# Patient Record
Sex: FEMALE | Race: WHITE | NOT HISPANIC OR LATINO | Employment: FULL TIME | URBAN - METROPOLITAN AREA
[De-identification: names, ages, dates, MRNs, and addresses within clinical notes are randomized per-mention and may not be internally consistent; named-entity substitution may affect disease eponyms.]

---
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
## 2021-01-26 ENCOUNTER — OFFICE VISIT (OUTPATIENT)
Dept: PSYCHIATRY | Facility: CLINIC | Age: 21
End: 2021-01-26
Payer: COMMERCIAL

## 2021-01-26 VITALS — BODY MASS INDEX: 32.63 KG/M2 | HEIGHT: 69 IN | WEIGHT: 220.3 LBS

## 2021-01-26 DIAGNOSIS — G47.00 INSOMNIA, UNSPECIFIED TYPE: ICD-10-CM

## 2021-01-26 DIAGNOSIS — F41.1 GENERALIZED ANXIETY DISORDER: Primary | ICD-10-CM

## 2021-01-26 DIAGNOSIS — F41.0 PANIC ATTACKS: ICD-10-CM

## 2021-01-26 DIAGNOSIS — F43.12 CHRONIC POST-TRAUMATIC STRESS DISORDER (PTSD): ICD-10-CM

## 2021-01-26 PROCEDURE — 90791 PSYCH DIAGNOSTIC EVALUATION: CPT | Performed by: PHYSICIAN ASSISTANT

## 2021-01-26 PROCEDURE — 3008F BODY MASS INDEX DOCD: CPT | Performed by: FAMILY MEDICINE

## 2021-01-26 RX ORDER — HYDROXYZINE 50 MG/1
TABLET, FILM COATED ORAL
Qty: 90 TABLET | Refills: 0 | Status: SHIPPED | OUTPATIENT
Start: 2021-01-26 | End: 2021-05-28 | Stop reason: SINTOL

## 2021-01-26 RX ORDER — PAROXETINE HYDROCHLORIDE 20 MG/1
20 TABLET, FILM COATED ORAL DAILY
Qty: 90 TABLET | Refills: 0 | Status: SHIPPED | OUTPATIENT
Start: 2021-01-26 | End: 2021-03-25 | Stop reason: SINTOL

## 2021-01-26 NOTE — BH TREATMENT PLAN
TREATMENT PLAN (Medication Management Only)        [facility]    Name and Date of Birth:  Rozina Dawkins 24 y o  2000  Date of Treatment Plan: January 26, 2021  Diagnosis/Diagnoses:    1  Generalized anxiety disorder    2  Chronic post-traumatic stress disorder (PTSD)    3  Panic attacks    4  Insomnia, unspecified type      Strengths/Personal Resources for Self-Care: "Music--I like to sing; I like to belly dance; I like to do make-up"  Area/Areas of need (in own words): "I wanna be more calm, I want to be more relaxed  I don't want to worry, worry worry "  She wants to be able to handle life situations better, not be "Scared of everything "  1  Long Term Goal: Control anxiety, panic and PTSD  Target Date:3-6 months  Person/Persons responsible for completion of goal: Fanta Tripp  2  Short Term Objective (s) - How will we reach this goal?:   A  Provider new recommended medication/dosage changes and/or continue medication(s): Pt is having Sxs of severe ANGIE, also PTSD, Panic Attacks, and Insomnia  She is dysphoric but denies depression  Uncertain if true OCD vs severe exacerbation of anxiety and will further explore in the future  Tx options discussed and Pt accepts to start Paroxetine for all Sxs and to restart Hydroxyzine (which she had tried in the past) for insomnia  I advised against street THC and Pt intends to get her medical marijuana card  I recommend psychotherapy and Pt wants community resources --was given [employer] and [employer]   Treatment plan done and Pt accepts the plan  Continue:  Start Paroxetine 20mg (1) tab po qd # 90  In 1 week start Hydroxyzine 50mg (1/2-1) tab po qhs prn insomnia # 90  Serum Beta HCG, UDS  Return 4-8 weeks, the sooner available appt (availability is tight)  Call in 1 month to let me know how she is doing    Can call any time sooner prn   A copy of Tx plan is being given to Pt per her request   She is working on getting a [employer] passcode--had some difficulties with the process and has contacted her PCP about it  Target Date:3-6 months  Person/Persons Responsible for Completion of Goal: Gladys Meza  Progress Towards Goals: stable, continuing treatment  Treatment Modality: Medication mgt, referred to psychotherapy  Review due 180 days from date of this plan: 4-6 months  Expected length of service: ongoing treatment  My Physician/PA/NP and I have developed this plan together and I agree to work on the goals and objectives  I understand the treatment goals that were developed for my treatment

## 2021-02-14 ENCOUNTER — OFFICE VISIT (OUTPATIENT)
Dept: URGENT CARE | Facility: MEDICAL CENTER | Age: 21
End: 2021-02-14
Payer: COMMERCIAL

## 2021-02-14 VITALS
TEMPERATURE: 98.2 F | OXYGEN SATURATION: 97 % | WEIGHT: 220 LBS | BODY MASS INDEX: 32.58 KG/M2 | HEART RATE: 82 BPM | HEIGHT: 69 IN

## 2021-02-14 DIAGNOSIS — B34.9 ACUTE VIRAL SYNDROME: Primary | ICD-10-CM

## 2021-02-14 PROCEDURE — G0382 LEV 3 HOSP TYPE B ED VISIT: HCPCS | Performed by: PHYSICIAN ASSISTANT

## 2021-02-14 PROCEDURE — 99213 OFFICE O/P EST LOW 20 MIN: CPT | Performed by: PHYSICIAN ASSISTANT

## 2021-02-14 PROCEDURE — U0005 INFEC AGEN DETEC AMPLI PROBE: HCPCS | Performed by: PHYSICIAN ASSISTANT

## 2021-02-14 PROCEDURE — U0003 INFECTIOUS AGENT DETECTION BY NUCLEIC ACID (DNA OR RNA); SEVERE ACUTE RESPIRATORY SYNDROME CORONAVIRUS 2 (SARS-COV-2) (CORONAVIRUS DISEASE [COVID-19]), AMPLIFIED PROBE TECHNIQUE, MAKING USE OF HIGH THROUGHPUT TECHNOLOGIES AS DESCRIBED BY CMS-2020-01-R: HCPCS | Performed by: PHYSICIAN ASSISTANT

## 2021-02-14 PROCEDURE — 99283 EMERGENCY DEPT VISIT LOW MDM: CPT | Performed by: PHYSICIAN ASSISTANT

## 2021-02-14 NOTE — PROGRESS NOTES
[address]        NAME: Jean-Pierre Peralta is a 24 y o  female  : 2000    MRN: [id number]  DATE: 2021  TIME: 9:20 AM    Assessment and Plan   Acute viral syndrome [B34 9]  1  Acute viral syndrome  Novel Coronavirus (Covid-19),PCR [facility] - Office Collection     COVID-19 swab performed due to symptoms  Advised to isolate and treat symptomatically until results available  Patient Instructions   Tylenol or Motrin for pain or fever  Drink plenty of fluids and get rest  May use OTC cough or congestion medications  Isolate until results available  Follow up with PCP in 3-5 days  Proceed to  ER if symptoms worsen  Chief Complaint     Chief Complaint   Patient presents with    Cold Like Symptoms     she had been having these symptoms since monday    Cough    Fatigue    Shortness of Breath    Fever    Headache    Sore Throat         History of Present Illness       Patient is a 31-year-old female who presents today with complaints of sore throat, cough, congestion, diarrhea, shortness of breath, chills, low-grade fever for the past 6 days  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  Fevers have not been over 100  Patient does have asthma as well but has not needed to use her inhaler, no wheezing  Denies any abdominal pain or nausea or vomiting  Has been taking OTC medications with some relief  Review of Systems   Review of Systems   Constitutional: Positive for chills, fatigue and fever  HENT: Positive for congestion and sore throat  Negative for ear pain  Respiratory: Positive for cough and shortness of breath  Negative for wheezing  Cardiovascular: Negative for chest pain  Gastrointestinal: Positive for diarrhea  Negative for abdominal pain, nausea and vomiting  Musculoskeletal: Negative for myalgias           Current Medications       Current Outpatient Medications:     acetaminophen (TYLENOL) 325 mg tablet, Take 2 tablets (650 mg total) by mouth every 6 (six) hours as needed for mild pain, headaches or fever, Disp: 30 tablet, Rfl: 0    albuterol (PROVENTIL HFA,VENTOLIN HFA) 90 mcg/act inhaler, Inhale 2 puffs every 6 (six) hours as needed for wheezing or shortness of breath, Disp: 1 Inhaler, Rfl: 1    Cholecalciferol (CVS VITAMIN D3) 1000 units capsule, Take 1,000 Units by mouth daily , Disp: , Rfl:     Ferrous Sulfate (IRON) 325 (65 Fe) MG TABS, Take 1 tablet by mouth daily , Disp: , Rfl:     hydrOXYzine HCL (ATARAX) 50 mg tablet, Start in 1 week and take 1/2-1 tab po qhs prn insomnia, Disp: 90 tablet, Rfl: 0    Loratadine (CLARITIN PO), Take by mouth daily, Disp: , Rfl:     medroxyPROGESTERone (DEPO-PROVERA) 150 mg/mL injection, Inject 1 mL (150 mg total) into a muscle every 3 (three) months, Disp: 1 mL, Rfl: 3    PARoxetine (PAXIL) 20 mg tablet, Take 1 tablet (20 mg total) by mouth daily, Disp: 90 tablet, Rfl: 0    tobramycin (TOBREX) 0 3 % SOLN, Administer 1 drop to the right eye every 4 (four) hours while awake (Patient not taking: Reported on 2/14/2021), Disp: 5 mL, Rfl: 0    Current Facility-Administered Medications:     medroxyPROGESTERone (DEPO-PROVERA) IM injection 150 mg, 150 mg, Intramuscular, Q3 Months, Tara Deshpande PA-C, 150 mg at 10/01/20 1316    Current Allergies     Allergies as of 02/14/2021    (No Known Allergies)            The following portions of the patient's history were reviewed and updated as appropriate: allergies, current medications, past family history, past medical history, past social history, past surgical history and problem list      Past Medical History:   Diagnosis Date    Allergic     pollen; animal dander    Anemia     Anxiety     Asthma     Closed left ankle fracture     Depression     Dysmenorrhea     Influenza A 2/28/2020    Insomnia     Moderate single current episode of major depressive disorder ([facility] [address] ) 1/5/2017       Past Surgical History:   Procedure Laterality Date    ANKLE SURGERY Left 2014    two screws       Family History   Problem Relation Age of Onset    Bipolar disorder Mother         Fentanyl per Pt    Drug abuse Mother     Anxiety disorder Sister     Bipolar disorder Sister     Autism spectrum disorder Cousin     Leukemia Paternal Aunt          Medications have been verified  Objective   Pulse 82   Temp 98 2 °F (36 8 °C)   Ht 5' 9" (1 753 m)   Wt 99 8 kg (220 lb)   SpO2 97%   BMI 32 49 kg/m²        Physical Exam     Physical Exam  Constitutional:       General: She is not in acute distress  Appearance: She is well-developed  She is obese  HENT:      Head: Normocephalic and atraumatic  Nose: Congestion present  Mouth/Throat:      Mouth: Mucous membranes are moist       Pharynx: Oropharynx is clear  Eyes:      Pupils: Pupils are equal, round, and reactive to light  Neck:      Musculoskeletal: Neck supple  Cardiovascular:      Rate and Rhythm: Normal rate and regular rhythm  Pulmonary:      Effort: Pulmonary effort is normal       Breath sounds: Normal breath sounds  No decreased breath sounds, wheezing, rhonchi or rales  Abdominal:      General: Bowel sounds are normal       Palpations: Abdomen is soft  Lymphadenopathy:      Cervical: No cervical adenopathy  Skin:     General: Skin is warm and dry  Neurological:      Mental Status: She is alert

## 2021-02-14 NOTE — LETTER
February 14, 2021     Patient: Alfredo Thornton   YOB: 2000   Date of Visit: 2/14/2021       To Whom it May Concern:    Alfredo Thornton was seen in my clinic on 2/14/2021  Please excuse from work until test results available  If you have any questions or concerns, please don't hesitate to call           Sincerely,          Jatinder Perez PA-C        CC: Alfredo Thornton

## 2021-02-15 LAB — SARS-COV-2 RNA RESP QL NAA+PROBE: NEGATIVE

## 2021-03-19 ENCOUNTER — TELEPHONE (OUTPATIENT)
Dept: FAMILY MEDICINE CLINIC | Facility: MEDICAL CENTER | Age: 21
End: 2021-03-19

## 2021-03-19 ENCOUNTER — CLINICAL SUPPORT (OUTPATIENT)
Dept: OBGYN CLINIC | Facility: MEDICAL CENTER | Age: 21
End: 2021-03-19
Payer: COMMERCIAL

## 2021-03-19 DIAGNOSIS — M54.50 LOW BACK PAIN, UNSPECIFIED BACK PAIN LATERALITY, UNSPECIFIED CHRONICITY, UNSPECIFIED WHETHER SCIATICA PRESENT: Primary | ICD-10-CM

## 2021-03-19 DIAGNOSIS — Z30.42 ENCOUNTER FOR MANAGEMENT AND INJECTION OF DEPO-PROVERA: Primary | ICD-10-CM

## 2021-03-19 PROCEDURE — 96372 THER/PROPH/DIAG INJ SC/IM: CPT | Performed by: NURSE PRACTITIONER

## 2021-03-19 RX ORDER — MEDROXYPROGESTERONE ACETATE 150 MG/ML
150 INJECTION, SUSPENSION INTRAMUSCULAR ONCE
Status: COMPLETED | OUTPATIENT
Start: 2021-03-19 | End: 2021-03-19

## 2021-03-19 RX ADMIN — MEDROXYPROGESTERONE ACETATE 150 MG: 150 INJECTION, SUSPENSION INTRAMUSCULAR at 11:47

## 2021-03-19 NOTE — PROGRESS NOTES
Date last yearly: 7/7/20  Last Depo-Provera: 12/22/21  Side Effects if any: Tolerated well  Serum HCG indicated?  No  Depo-Provera 150 mg IM given by: Ham Padron MA  LMP/Bleeding/Spotting: spotting sometimes  Site: L buttock  Next appointment due June 4-618/21  JILINDA:[id number]  Exp:03/2023  MBE:[id number]  Manufacture:CARLO

## 2021-03-19 NOTE — TELEPHONE ENCOUNTER
Pt called to ask advice from Dr Israel Olsen  She has been through physical therapy and is still having "extreme" tailbone and leg pain  She is not sure what she should do next    Please advise
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
## 2021-03-25 ENCOUNTER — OFFICE VISIT (OUTPATIENT)
Dept: PSYCHIATRY | Facility: CLINIC | Age: 21
End: 2021-03-25
Payer: COMMERCIAL

## 2021-03-25 DIAGNOSIS — F43.12 CHRONIC POST-TRAUMATIC STRESS DISORDER (PTSD): ICD-10-CM

## 2021-03-25 DIAGNOSIS — F31.81 BIPOLAR II DISORDER (HCC): Primary | ICD-10-CM

## 2021-03-25 DIAGNOSIS — F41.0 PANIC ATTACKS: ICD-10-CM

## 2021-03-25 DIAGNOSIS — G47.00 INSOMNIA, UNSPECIFIED TYPE: ICD-10-CM

## 2021-03-25 DIAGNOSIS — F41.1 GENERALIZED ANXIETY DISORDER: ICD-10-CM

## 2021-03-25 PROCEDURE — 99213 OFFICE O/P EST LOW 20 MIN: CPT | Performed by: PHYSICIAN ASSISTANT

## 2021-03-25 RX ORDER — LAMOTRIGINE 100 MG/1
TABLET ORAL
Qty: 15 TABLET | Refills: 2 | Status: SHIPPED | OUTPATIENT
Start: 2021-03-25 | End: 2021-05-28 | Stop reason: SINTOL

## 2021-03-25 RX ORDER — LAMOTRIGINE 25 MG/1
25 TABLET ORAL DAILY
Qty: 14 TABLET | Refills: 0 | Status: SHIPPED | OUTPATIENT
Start: 2021-03-25 | End: 2021-05-28 | Stop reason: SINTOL

## 2021-03-30 ENCOUNTER — CONSULT (OUTPATIENT)
Dept: PAIN MEDICINE | Facility: CLINIC | Age: 21
End: 2021-03-30
Payer: COMMERCIAL

## 2021-03-30 VITALS
RESPIRATION RATE: 16 BRPM | DIASTOLIC BLOOD PRESSURE: 92 MMHG | SYSTOLIC BLOOD PRESSURE: 132 MMHG | WEIGHT: 235.8 LBS | HEIGHT: 69 IN | BODY MASS INDEX: 34.93 KG/M2 | HEART RATE: 89 BPM

## 2021-03-30 DIAGNOSIS — M54.42 CHRONIC BILATERAL LOW BACK PAIN WITH BILATERAL SCIATICA: ICD-10-CM

## 2021-03-30 DIAGNOSIS — M54.16 LUMBAR RADICULOPATHY: ICD-10-CM

## 2021-03-30 DIAGNOSIS — G89.29 CHRONIC BILATERAL LOW BACK PAIN WITH BILATERAL SCIATICA: ICD-10-CM

## 2021-03-30 DIAGNOSIS — M54.41 CHRONIC BILATERAL LOW BACK PAIN WITH BILATERAL SCIATICA: ICD-10-CM

## 2021-03-30 DIAGNOSIS — G89.4 CHRONIC PAIN SYNDROME: Primary | ICD-10-CM

## 2021-03-30 PROCEDURE — 99244 OFF/OP CNSLTJ NEW/EST MOD 40: CPT | Performed by: ANESTHESIOLOGY

## 2021-03-30 PROCEDURE — 1036F TOBACCO NON-USER: CPT | Performed by: ANESTHESIOLOGY

## 2021-03-30 PROCEDURE — 3008F BODY MASS INDEX DOCD: CPT | Performed by: ANESTHESIOLOGY

## 2021-03-30 NOTE — PATIENT INSTRUCTIONS
Magnetic Resonance Imaging   WHAT YOU NEED TO KNOW:   What do I need to know about magnetic resonance imaging (MRI)? An MRI is a test that uses magnetic fields and radio waves to take pictures inside your body  An MRI is used to see blood vessels, tissue, muscles, and bones  It can also show organs, such as your heart, lungs, or liver  An MRI can help your healthcare provider diagnose or treat a medical condition  It does not use radiation  How do I prepare for an MRI? · Your healthcare provider will tell you which medicines to take or not take on the day of your MRI  He or she may give you medicine to help you feel calm and relaxed during the MRI  · Tell your provider if you know or think you are pregnant  · Tell your provider if you have any metal in your body, such as a pacemaker, implant, or aneurism clip  Tell him or her if you have a tattoo or wear a medicine patch  · Remove any metal items, such as hair clips, jewelry, glasses, hearing aids, or dentures before you enter the MRI room  What will happen during an MRI? Your healthcare provider will ask you to lie on a table  Contrast liquid may be used to help a body part show up more clearly  The table will be moved into an open space in the middle of the machine  You will need to lie still during the MRI  It is normal to hear knocking, thumping, or clicking noises from the machine  What are the risks of an MRI? An MRI may cause a metal object in your body to move out of place and cause serious injury, or stop working properly  The contrast liquid may cause nausea, a headache, lightheadedness, or pain at the injection site  You could have an allergic reaction to the contrast liquid  CARE AGREEMENT:   You have the right to help plan your care  Learn about your health condition and how it may be treated  Discuss treatment options with your healthcare providers to decide what care you want to receive   You always have the right to refuse treatment  The above information is an  only  It is not intended as medical advice for individual conditions or treatments  Talk to your doctor, nurse or pharmacist before following any medical regimen to see if it is safe and effective for you  © Copyright [address] Information is for End User's use only and may not be sold, redistributed or otherwise used for commercial purposes   All illustrations and images included in CareNotes® are the copyrighted property of A D A M , Inc  or [address] [place] [address]
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
## 2021-04-17 ENCOUNTER — OFFICE VISIT (OUTPATIENT)
Dept: URGENT CARE | Facility: MEDICAL CENTER | Age: 21
End: 2021-04-17
Payer: COMMERCIAL

## 2021-04-17 VITALS
HEIGHT: 69 IN | WEIGHT: 200 LBS | TEMPERATURE: 97 F | OXYGEN SATURATION: 98 % | HEART RATE: 98 BPM | BODY MASS INDEX: 29.62 KG/M2

## 2021-04-17 DIAGNOSIS — J02.9 SORE THROAT: Primary | ICD-10-CM

## 2021-04-17 LAB — S PYO AG THROAT QL: NEGATIVE

## 2021-04-17 PROCEDURE — 99213 OFFICE O/P EST LOW 20 MIN: CPT | Performed by: PHYSICIAN ASSISTANT

## 2021-04-17 PROCEDURE — 99283 EMERGENCY DEPT VISIT LOW MDM: CPT | Performed by: PHYSICIAN ASSISTANT

## 2021-04-17 PROCEDURE — 3008F BODY MASS INDEX DOCD: CPT | Performed by: ANESTHESIOLOGY

## 2021-04-17 PROCEDURE — 87880 STREP A ASSAY W/OPTIC: CPT | Performed by: PHYSICIAN ASSISTANT

## 2021-04-17 PROCEDURE — G0382 LEV 3 HOSP TYPE B ED VISIT: HCPCS | Performed by: PHYSICIAN ASSISTANT

## 2021-04-17 NOTE — PROGRESS NOTES
[facility]        NAME: Jewels Sánchez is a 24 y o  female  : 2000    MRN: [id number]  DATE: 2021  TIME: 7:39 PM    Assessment and Plan   Sore throat [J02 9]  1  Sore throat  POCT rapid strepA      strep negative in office  Her physical exam is more consistent with small blisters that are likely due to thermal burn or aphthous ulcers  Advised Tylenol or Motrin for pain and warm salt water gargles  Patient Instructions     Tylenol or Motrin for pain   warm salt water gargles  Follow up with PCP in 3-5 days  Proceed to  ER if symptoms worsen  Chief Complaint     Chief Complaint   Patient presents with    Sore Throat     since last night         History of Present Illness        Patient is a 64-year-old female who presents today with complaints of sore throat for the past 2 days  She does report 3 days ago eating really hot food and she felt that burn the back of her throat and felt the burn "all the way down"  She denies any other symptoms including fever, chills, cough, congestion, headache, body aches  She did notice 2 small white spots on the right side of the throat  Review of Systems   Review of Systems   Constitutional: Negative for chills and fever  HENT: Positive for sore throat  Negative for congestion  Respiratory: Negative for shortness of breath  Cardiovascular: Negative for chest pain           Current Medications       Current Outpatient Medications:     acetaminophen (TYLENOL) 325 mg tablet, Take 2 tablets (650 mg total) by mouth every 6 (six) hours as needed for mild pain, headaches or fever, Disp: 30 tablet, Rfl: 0    albuterol (PROVENTIL HFA,VENTOLIN HFA) 90 mcg/act inhaler, Inhale 2 puffs every 6 (six) hours as needed for wheezing or shortness of breath, Disp: 1 Inhaler, Rfl: 1    Cholecalciferol (CVS VITAMIN D3) 1000 units capsule, Take 1,000 Units by mouth daily , Disp: , Rfl:     Ferrous Sulfate (IRON) 325 (65 Fe) MG TABS, Take 1 tablet by mouth daily , Disp: , Rfl:     lamoTRIgine (LaMICtal) 100 mg tablet, Start after the 25mg tabs are finished    Take 1/2 tab po qhs, Disp: 15 tablet, Rfl: 2    lamoTRIgine (LaMICtal) 25 mg tablet, Take 1 tablet (25 mg total) by mouth daily Start with this dose first, Disp: 14 tablet, Rfl: 0    Loratadine (CLARITIN PO), Take by mouth daily, Disp: , Rfl:     medroxyPROGESTERone (DEPO-PROVERA) 150 mg/mL injection, Inject 1 mL (150 mg total) into a muscle every 3 (three) months, Disp: 1 mL, Rfl: 3    hydrOXYzine HCL (ATARAX) 50 mg tablet, Start in 1 week and take 1/2-1 tab po qhs prn insomnia (Patient not taking: Reported on 4/17/2021), Disp: 90 tablet, Rfl: 0    tobramycin (TOBREX) 0 3 % SOLN, Administer 1 drop to the right eye every 4 (four) hours while awake (Patient not taking: Reported on 4/17/2021), Disp: 5 mL, Rfl: 0    Current Facility-Administered Medications:     medroxyPROGESTERone (DEPO-PROVERA) IM injection 150 mg, 150 mg, Intramuscular, Q3 Months, Tara Deshpande PA-C, 150 mg at 10/01/20 1316    Current Allergies     Allergies as of 04/17/2021    (No Known Allergies)            The following portions of the patient's history were reviewed and updated as appropriate: allergies, current medications, past family history, past medical history, past social history, past surgical history and problem list      Past Medical History:   Diagnosis Date    Allergic     pollen; animal dander    Anemia     Anxiety     Asthma     Closed left ankle fracture     Depression     Dysmenorrhea     Influenza A 2/28/2020    Insomnia     Moderate single current episode of major depressive disorder ([facility] [address] ) 1/5/2017       Past Surgical History:   Procedure Laterality Date    ANKLE SURGERY Left 2014    two screws       Family History   Problem Relation Age of Onset    Bipolar disorder Mother         Fentanyl per Pt    Drug abuse Mother     Anxiety disorder Sister     Bipolar disorder Sister     Autism spectrum disorder Cousin     Leukemia Paternal Aunt     No Known Problems Father     Bipolar disorder Half-Sister          Medications have been verified  Objective   Pulse 98   Temp (!) 97 °F (36 1 °C)   Ht 5' 9" (1 753 m)   Wt 90 7 kg (200 lb)   SpO2 98%   BMI 29 53 kg/m²        Physical Exam     Physical Exam  Constitutional:       General: She is not in acute distress  Appearance: She is well-developed  She is not ill-appearing  HENT:      Head: Normocephalic and atraumatic  Nose: No congestion or rhinorrhea  Mouth/Throat:      Mouth: Mucous membranes are moist  Oral lesions present  Pharynx: Oropharynx is clear  Uvula midline  No pharyngeal swelling, oropharyngeal exudate, posterior oropharyngeal erythema or uvula swelling  Tonsils: No tonsillar exudate or tonsillar abscesses  0 on the right  0 on the left  Neck:      Musculoskeletal: Neck supple  Cardiovascular:      Rate and Rhythm: Normal rate and regular rhythm  Pulmonary:      Effort: Pulmonary effort is normal       Breath sounds: Normal breath sounds  Lymphadenopathy:      Cervical: No cervical adenopathy  Skin:     General: Skin is warm and dry  Neurological:      Mental Status: She is alert

## 2021-05-03 ENCOUNTER — HOSPITAL ENCOUNTER (OUTPATIENT)
Dept: MRI IMAGING | Facility: HOSPITAL | Age: 21
Discharge: HOME | End: 2021-05-03
Attending: ANESTHESIOLOGY
Payer: COMMERCIAL

## 2021-05-03 DIAGNOSIS — M54.41 CHRONIC BILATERAL LOW BACK PAIN WITH BILATERAL SCIATICA: ICD-10-CM

## 2021-05-03 DIAGNOSIS — M54.42 CHRONIC BILATERAL LOW BACK PAIN WITH BILATERAL SCIATICA: ICD-10-CM

## 2021-05-03 DIAGNOSIS — G89.29 CHRONIC BILATERAL LOW BACK PAIN WITH BILATERAL SCIATICA: ICD-10-CM

## 2021-05-03 DIAGNOSIS — M54.16 LUMBAR RADICULOPATHY: ICD-10-CM

## 2021-05-03 DIAGNOSIS — G89.4 CHRONIC PAIN SYNDROME: ICD-10-CM

## 2021-05-03 PROCEDURE — G1004 CDSM NDSC: HCPCS

## 2021-05-03 PROCEDURE — 72148 MRI LUMBAR SPINE W/O DYE: CPT

## 2021-05-07 ENCOUNTER — TELEPHONE (OUTPATIENT)
Dept: FAMILY MEDICINE CLINIC | Facility: MEDICAL CENTER | Age: 21
End: 2021-05-07

## 2021-05-07 ENCOUNTER — TELEPHONE (OUTPATIENT)
Dept: RADIOLOGY | Facility: CLINIC | Age: 21
End: 2021-05-07

## 2021-05-07 DIAGNOSIS — M54.16 LUMBAR RADICULOPATHY: Primary | ICD-10-CM

## 2021-05-07 NOTE — TELEPHONE ENCOUNTER
----- Message from Arden Alegria MD sent at 5/7/2021 11:58 AM EDT -----  Regarding: MRI L-spine results  MRI of the lumbar spine is completely normal   If she is still concerned about her symptoms, I can get a EMG/NCV test to see if she has any signs of peripheral neuropathy

## 2021-05-07 NOTE — TELEPHONE ENCOUNTER
Pt called to ask if her MRI results were available yet  She saw the results on her MyChart and saw that she has cyst on her left kidney  She is experiencing the following symptoms:  Bloody discharge in her urine X past few weeks, occasional pain in left side of stomach and random nausea  Neither the pain nor the nausea stay; they come and go  She mentioned that she has not been sexually active for about a month  She also saw she has a curve in her lumbar spine which can be readjusted  She would like to know what that would entail      Routed to Dr Evelyne Chiu

## 2021-05-10 NOTE — TELEPHONE ENCOUNTER
Patient called in stating that has a cyst on her left kidney and has questions regarding her MRI of her spine  She wants to know if these conditions could be causing her pain  Pt is aware to call central scheduling to schedule EMG  cb [phone] to leave or call back on Wednesday- pt is off

## 2021-05-10 NOTE — TELEPHONE ENCOUNTER
S/w pt  Pt would like to move forward with an EMG  Please place order and I will advise pt on scheduling

## 2021-05-10 NOTE — TELEPHONE ENCOUNTER
Patient needs to call the ordering doctor for follow-up on her MRI  If she has any other concerns she will need to schedule an office visit

## 2021-05-10 NOTE — TELEPHONE ENCOUNTER
Advised pt MRI WNL  Pt asking if AS thinks kidney cyst could be contributing to pain  Pt made her PCP aware who advised her to call [facility]

## 2021-05-11 NOTE — TELEPHONE ENCOUNTER
The cyst that was remarked on in her MRI is tiny ("Subcentimeter cyst" refers to it not being able to be measured because it is so small)  I do not think this is contributing to her pain

## 2021-05-26 ENCOUNTER — TELEPHONE (OUTPATIENT)
Dept: FAMILY MEDICINE CLINIC | Facility: MEDICAL CENTER | Age: 21
End: 2021-05-26

## 2021-05-26 ENCOUNTER — OFFICE VISIT (OUTPATIENT)
Dept: URGENT CARE | Facility: MEDICAL CENTER | Age: 21
End: 2021-05-26
Payer: COMMERCIAL

## 2021-05-26 DIAGNOSIS — R11.0 NAUSEA: ICD-10-CM

## 2021-05-26 DIAGNOSIS — B37.0 ORAL THRUSH: Primary | ICD-10-CM

## 2021-05-26 PROCEDURE — U0003 INFECTIOUS AGENT DETECTION BY NUCLEIC ACID (DNA OR RNA); SEVERE ACUTE RESPIRATORY SYNDROME CORONAVIRUS 2 (SARS-COV-2) (CORONAVIRUS DISEASE [COVID-19]), AMPLIFIED PROBE TECHNIQUE, MAKING USE OF HIGH THROUGHPUT TECHNOLOGIES AS DESCRIBED BY CMS-2020-01-R: HCPCS | Performed by: PHYSICIAN ASSISTANT

## 2021-05-26 PROCEDURE — U0005 INFEC AGEN DETEC AMPLI PROBE: HCPCS | Performed by: PHYSICIAN ASSISTANT

## 2021-05-26 PROCEDURE — 99213 OFFICE O/P EST LOW 20 MIN: CPT | Performed by: PHYSICIAN ASSISTANT

## 2021-05-26 RX ORDER — ONDANSETRON 4 MG/1
4 TABLET, FILM COATED ORAL EVERY 8 HOURS PRN
Qty: 20 TABLET | Refills: 0 | Status: SHIPPED | OUTPATIENT
Start: 2021-05-26 | End: 2021-12-21

## 2021-05-26 NOTE — TELEPHONE ENCOUNTER
[facility] went to the [facility] today, wants you to review the note from them and see if you want to follow up  She was told she should see her PCP because she had thrush and her immune system "must be weakened"  Please advise

## 2021-05-26 NOTE — PATIENT INSTRUCTIONS
Oral thrush  Nystatin as directed  Nausea  zofran as needed  Follow up with PCP in 3-5 days  Proceed to  ER if symptoms worsen  Oral Candidiasis   WHAT YOU NEED TO KNOW:   Oral candidiasis, or thrush, is a fungal infection that affects the inside of your mouth  DISCHARGE INSTRUCTIONS:   Return to the emergency department if:   · You have trouble swallowing and your jaw and neck are stiff  · You are dizzy, thirsty, or have a dry mouth  · You are urinating little or not at all  · You cannot eat or drink because of the pain  Contact your healthcare provider if:   · You have a fever  · You have nausea, vomiting, or diarrhea  · Your signs and symptoms get worse, even after treatment  · You have questions or concerns about your condition or care  Medicines:   · Antifungal medicine  helps kill the fungus that caused your oral candidiasis  This medicine may be a pill or a solution that you gargle  Remove dentures before you gargle  · Take your medicine as directed  Contact your healthcare provider if you think your medicine is not helping or if you have side effects  Tell him of her if you are allergic to any medicine  Keep a list of the medicines, vitamins, and herbs you take  Include the amounts, and when and why you take them  Bring the list or the pill bottles to follow-up visits  Carry your medicine list with you in case of an emergency  Prevent oral candidiasis:  Brush your teeth, gums, and tongue after you eat and before you go to sleep  Use a toothbrush with soft bristles  See your dentist for regular exams  Remove your dentures when you sleep, or at least 6 hours each day  Clean your dentures and soak them in denture   Let them air dry after soaking  Follow up with your healthcare provider as directed:  Write down your questions so you remember to ask them during your visits     © Copyright [address] Information is for End User's use only and may not be sold, redistributed or otherwise used for commercial purposes  All illustrations and images included in CareNotes® are the copyrighted property of [employer]  or Marco Antonio Sutherland  The above information is an  only  It is not intended as medical advice for individual conditions or treatments  Talk to your doctor, nurse or pharmacist before following any medical regimen to see if it is safe and effective for you

## 2021-05-26 NOTE — PROGRESS NOTES
[employer]        NAME: Randye Carrel is a 24 y o  female  : 2000    MRN: [id number]  DATE: May 26, 2021  TIME: 12:32 PM    Assessment and Plan   Oral thrush [B37 0]  1  Oral thrush  nystatin (MYCOSTATIN) 500,000 units/5 mL suspension   2  Nausea  ondansetron (ZOFRAN) 4 mg tablet    Novel Coronavirus (Covid-19),PCR [facility] - Office Collection         Patient Instructions     Oral thrush  Nystatin as directed  Nausea  zofran as needed  Follow up with PCP in 3-5 days  Proceed to  ER if symptoms worsen  Chief Complaint     Chief Complaint   Patient presents with    Nasal Congestion     Started 3 days ago with thrush, headache, nausea, vomited once yesterday and stuffy nose  Took vitamin C tablet  She wanted to just let us know couple weeks ago they found a small cyst on her left kidney  History of Present Illness       25 y/o female presents c/o nausea and vomiting x 1  White coating of her tongue x 3 days  Denies fever, chills, chest pain, SOB  States she has not been sexually active x 5 months      Review of Systems   Review of Systems   Constitutional: Negative  HENT: Negative  Eyes: Negative  Respiratory: Negative  Negative for cough, chest tightness, shortness of breath, wheezing and stridor  Cardiovascular: Negative  Negative for chest pain, palpitations and leg swelling  Gastrointestinal: Positive for nausea and vomiting  Negative for abdominal distention, abdominal pain, anal bleeding, blood in stool, constipation, diarrhea and rectal pain           Current Medications       Current Outpatient Medications:     Loratadine (CLARITIN PO), Take by mouth daily, Disp: , Rfl:     medroxyPROGESTERone (DEPO-PROVERA) 150 mg/mL injection, Inject 1 mL (150 mg total) into a muscle every 3 (three) months, Disp: 1 mL, Rfl: 3    acetaminophen (TYLENOL) 325 mg tablet, Take 2 tablets (650 mg total) by mouth every 6 (six) hours as needed for mild pain, headaches or fever (Patient not taking: Reported on 5/26/2021), Disp: 30 tablet, Rfl: 0    albuterol (PROVENTIL HFA,VENTOLIN HFA) 90 mcg/act inhaler, Inhale 2 puffs every 6 (six) hours as needed for wheezing or shortness of breath (Patient not taking: Reported on 5/26/2021), Disp: 1 Inhaler, Rfl: 1    Cholecalciferol (CVS VITAMIN D3) 1000 units capsule, Take 1,000 Units by mouth daily , Disp: , Rfl:     Ferrous Sulfate (IRON) 325 (65 Fe) MG TABS, Take 1 tablet by mouth daily , Disp: , Rfl:     hydrOXYzine HCL (ATARAX) 50 mg tablet, Start in 1 week and take 1/2-1 tab po qhs prn insomnia (Patient not taking: Reported on 4/17/2021), Disp: 90 tablet, Rfl: 0    lamoTRIgine (LaMICtal) 100 mg tablet, Start after the 25mg tabs are finished    Take 1/2 tab po qhs, Disp: 15 tablet, Rfl: 2    lamoTRIgine (LaMICtal) 25 mg tablet, Take 1 tablet (25 mg total) by mouth daily Start with this dose first, Disp: 14 tablet, Rfl: 0    nystatin (MYCOSTATIN) 500,000 units/5 mL suspension, Apply 5 mL (500,000 Units total) to the mouth or throat 4 (four) times a day for 7 days, Disp: 140 mL, Rfl: 0    ondansetron (ZOFRAN) 4 mg tablet, Take 1 tablet (4 mg total) by mouth every 8 (eight) hours as needed for nausea or vomiting, Disp: 20 tablet, Rfl: 0    tobramycin (TOBREX) 0 3 % SOLN, Administer 1 drop to the right eye every 4 (four) hours while awake (Patient not taking: Reported on 4/17/2021), Disp: 5 mL, Rfl: 0    Current Facility-Administered Medications:     medroxyPROGESTERone (DEPO-PROVERA) IM injection 150 mg, 150 mg, Intramuscular, Q3 Months, Tara Deshpande PA-C, 150 mg at 10/01/20 1316    Current Allergies     Allergies as of 05/26/2021    (No Known Allergies)            The following portions of the patient's history were reviewed and updated as appropriate: allergies, current medications, past family history, past medical history, past social history, past surgical history and problem list      Past Medical History:   Diagnosis Date    Allergic pollen; animal dander    Anemia     Anxiety     Asthma     Closed left ankle fracture     Depression     Dysmenorrhea     Influenza A 2/28/2020    Insomnia     Moderate single current episode of major depressive disorder ([address] ) 1/5/2017       Past Surgical History:   Procedure Laterality Date    ANKLE SURGERY Left 2014    two screws       Family History   Problem Relation Age of Onset    Bipolar disorder Mother         Fentanyl per Pt    Drug abuse Mother     Anxiety disorder Sister     Bipolar disorder Sister     Autism spectrum disorder Cousin     Leukemia Paternal Aunt     No Known Problems Father     Bipolar disorder Half-Sister          Medications have been verified  Objective   There were no vitals taken for this visit  Physical Exam     Physical Exam  Constitutional:       Appearance: She is well-developed  HENT:      Head: Normocephalic and atraumatic  Right Ear: External ear normal       Left Ear: External ear normal       Nose: Nose normal       Mouth/Throat:      Pharynx: No oropharyngeal exudate  Neck:      Musculoskeletal: Normal range of motion and neck supple  Cardiovascular:      Rate and Rhythm: Normal rate and regular rhythm  Heart sounds: Normal heart sounds  Pulmonary:      Effort: Pulmonary effort is normal  No respiratory distress  Breath sounds: Normal breath sounds  No wheezing or rales  Chest:      Chest wall: No tenderness  Abdominal:      General: Bowel sounds are normal  There is no distension  Palpations: Abdomen is soft  There is no mass  Tenderness: There is no abdominal tenderness  There is no guarding or rebound  Lymphadenopathy:      Cervical: No cervical adenopathy

## 2021-05-26 NOTE — LETTER
May 26, 2021     Patient: Nika Barron   YOB: 2000   Date of Visit: 5/26/2021       To Whom it May Concern:    Nika Barron was seen in my clinic on 5/26/2021  She  may return after results of lab tests    If you have any questions or concerns, please don't hesitate to call           Sincerely,          Chase Paz PA-C        CC: Nika Barron

## 2021-05-26 NOTE — TELEPHONE ENCOUNTER
Pt c/o nausea, vomitting and her tongue is coated like cottage cheese, h/a, diarrhea, sore throat, congestion, runny nose, cough  Symptoms started 3 days ago  No fever, no loses  No vaccination  Pt gets tested once a week at [facility]  Pt was tested on Thurs and will be tested again this Thursday  Please, advise pt

## 2021-05-27 LAB — SARS-COV-2 RNA RESP QL NAA+PROBE: NEGATIVE
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
## 2021-05-28 ENCOUNTER — TELEMEDICINE (OUTPATIENT)
Dept: PSYCHIATRY | Facility: CLINIC | Age: 21
End: 2021-05-28
Payer: COMMERCIAL

## 2021-05-28 DIAGNOSIS — F41.1 GENERALIZED ANXIETY DISORDER: ICD-10-CM

## 2021-05-28 DIAGNOSIS — F41.0 PANIC ATTACKS: ICD-10-CM

## 2021-05-28 DIAGNOSIS — F31.81 BIPOLAR II DISORDER (HCC): Primary | ICD-10-CM

## 2021-05-28 DIAGNOSIS — G47.00 INSOMNIA, UNSPECIFIED TYPE: ICD-10-CM

## 2021-05-28 DIAGNOSIS — F43.12 CHRONIC POST-TRAUMATIC STRESS DISORDER (PTSD): ICD-10-CM

## 2021-05-28 PROCEDURE — 99213 OFFICE O/P EST LOW 20 MIN: CPT | Performed by: PHYSICIAN ASSISTANT

## 2021-05-28 RX ORDER — ARIPIPRAZOLE 5 MG/1
TABLET ORAL
Qty: 30 TABLET | Refills: 3 | Status: SHIPPED | OUTPATIENT
Start: 2021-05-28 | End: 2021-11-23 | Stop reason: SINTOL

## 2021-05-28 NOTE — BH TREATMENT PLAN
TREATMENT PLAN (Medication Management Only)        [facility]    Name and Date of Birth:  Tushar Cash 24 y o  2000  Date of Treatment Plan: May 28, 2021  Diagnosis/Diagnoses:    1  Bipolar II disorder ([address] )    2  Generalized anxiety disorder    3  Panic attacks    4  Chronic post-traumatic stress disorder (PTSD)    5  Insomnia, unspecified type      Strengths/Personal Resources for Self-Care: "I'm very independent; I'm very smart"  Area/Areas of need (in own words): "I'm either overly angry or underly angry  I'm extremely tired or just hyper"  1  Long Term Goal: Maintain mood stability and control of anxiety  Target Date:3-6 months  Person/Persons responsible for completion of goal: Dewey Betts (private therapist)  2  Short Term Objective (s) - How will we reach this goal?:   A  Provider new recommended medication/dosage changes and/or continue medication(s): Pt is having severe anxiety, moderately severe depressive Sxs, recent food restriction  I recommended a disordered eating program and referred her to [facility] and [facility]  Pt is eating much better now  She does not seem to be an imminent danger to self or others or to fit criteria for involuntary commitment  Tx options discussed and Pt accepts to start Aripiprazole for mood mgt  D/C her existing medicines due to SE  Treatment plan done and Pt accepts the plan  Start Aripiprazole 5mg (1/2) tab po hs x 1 week, then (1) tab po qhs # 30 R3  D/C Lamotrigine--Pt already stopped it  D/C Hydroxyzine   Continue psychotherapy with Betzy at [facility]  Return 13-14 weeks, place on a cancellation list for an 8wk appt    Call sooner prn  A copy of Tx plan is being mailed to Pt per her request    Target Date:3-6 months  Person/Persons Responsible for Completion of Goal: Betzy Grimaldo  Progress Towards Goals: stable, continuing treatment  Treatment Modality: Medication mgt, psychotherapy  Review due 180 days from date of this plan: 4-6 months  Expected length of service: ongoing treatment  My Physician/PA/NP and I have developed this plan together and I agree to work on the goals and objectives  I understand the treatment goals that were developed for my treatment

## 2021-05-31 ENCOUNTER — HOSPITAL ENCOUNTER (EMERGENCY)
Facility: HOSPITAL | Age: 21
Discharge: HOME | End: 2021-05-31
Attending: EMERGENCY MEDICINE | Admitting: EMERGENCY MEDICINE
Payer: COMMERCIAL

## 2021-05-31 VITALS
WEIGHT: 228.62 LBS | OXYGEN SATURATION: 99 % | SYSTOLIC BLOOD PRESSURE: 117 MMHG | DIASTOLIC BLOOD PRESSURE: 78 MMHG | BODY MASS INDEX: 33.76 KG/M2 | RESPIRATION RATE: 20 BRPM | TEMPERATURE: 98.6 F | HEART RATE: 88 BPM

## 2021-05-31 DIAGNOSIS — B34.9 VIRAL SYNDROME: Primary | ICD-10-CM

## 2021-05-31 LAB
ALBUMIN SERPL BCP-MCNC: 4 G/DL (ref 3.5–5)
ALP SERPL-CCNC: 60 U/L (ref 46–384)
ALT SERPL W P-5'-P-CCNC: 16 U/L (ref 12–78)
ANION GAP SERPL CALCULATED.3IONS-SCNC: 7 MMOL/L (ref 4–13)
AST SERPL W P-5'-P-CCNC: 9 U/L (ref 5–45)
BASOPHILS # BLD AUTO: 0.04 THOUSANDS/ΜL (ref 0–0.1)
BASOPHILS NFR BLD AUTO: 0 % (ref 0–1)
BILIRUB SERPL-MCNC: 0.48 MG/DL (ref 0.2–1)
BILIRUB UR QL STRIP: NEGATIVE
BUN SERPL-MCNC: 5 MG/DL (ref 5–25)
CALCIUM SERPL-MCNC: 9.5 MG/DL (ref 8.3–10.1)
CHLORIDE SERPL-SCNC: 105 MMOL/L (ref 100–108)
CLARITY UR: CLEAR
CO2 SERPL-SCNC: 29 MMOL/L (ref 21–32)
COLOR UR: YELLOW
CREAT SERPL-MCNC: 0.69 MG/DL (ref 0.6–1.3)
EOSINOPHIL # BLD AUTO: 0.12 THOUSAND/ΜL (ref 0–0.61)
EOSINOPHIL NFR BLD AUTO: 1 % (ref 0–6)
ERYTHROCYTE [DISTWIDTH] IN BLOOD BY AUTOMATED COUNT: 13 % (ref 11.6–15.1)
EXT PREG TEST URINE: NEGATIVE
EXT. CONTROL ED NAV: NORMAL
GFR SERPL CREATININE-BSD FRML MDRD: 127 ML/MIN/1.73SQ M
GLUCOSE SERPL-MCNC: 105 MG/DL (ref 65–140)
GLUCOSE UR STRIP-MCNC: NEGATIVE MG/DL
HCT VFR BLD AUTO: 42.6 % (ref 34.8–46.1)
HGB BLD-MCNC: 14.1 G/DL (ref 11.5–15.4)
HGB UR QL STRIP.AUTO: NEGATIVE
IMM GRANULOCYTES # BLD AUTO: 0.03 THOUSAND/UL (ref 0–0.2)
IMM GRANULOCYTES NFR BLD AUTO: 0 % (ref 0–2)
KETONES UR STRIP-MCNC: NEGATIVE MG/DL
LEUKOCYTE ESTERASE UR QL STRIP: NEGATIVE
LIPASE SERPL-CCNC: 51 U/L (ref 73–393)
LYMPHOCYTES # BLD AUTO: 1.49 THOUSANDS/ΜL (ref 0.6–4.47)
LYMPHOCYTES NFR BLD AUTO: 14 % (ref 14–44)
MCH RBC QN AUTO: 28.6 PG (ref 26.8–34.3)
MCHC RBC AUTO-ENTMCNC: 33.1 G/DL (ref 31.4–37.4)
MCV RBC AUTO: 86 FL (ref 82–98)
MONOCYTES # BLD AUTO: 0.75 THOUSAND/ΜL (ref 0.17–1.22)
MONOCYTES NFR BLD AUTO: 7 % (ref 4–12)
NEUTROPHILS # BLD AUTO: 8.58 THOUSANDS/ΜL (ref 1.85–7.62)
NEUTS SEG NFR BLD AUTO: 78 % (ref 43–75)
NITRITE UR QL STRIP: NEGATIVE
NRBC BLD AUTO-RTO: 0 /100 WBCS
PH UR STRIP.AUTO: 7 [PH] (ref 4.5–8)
PLATELET # BLD AUTO: 283 THOUSANDS/UL (ref 149–390)
PMV BLD AUTO: 10 FL (ref 8.9–12.7)
POTASSIUM SERPL-SCNC: 4.3 MMOL/L (ref 3.5–5.3)
PROT SERPL-MCNC: 7.6 G/DL (ref 6.4–8.2)
PROT UR STRIP-MCNC: NEGATIVE MG/DL
RBC # BLD AUTO: 4.93 MILLION/UL (ref 3.81–5.12)
S PYO DNA THROAT QL NAA+PROBE: NORMAL
SARS-COV-2 RNA RESP QL NAA+PROBE: NEGATIVE
SODIUM SERPL-SCNC: 141 MMOL/L (ref 136–145)
SP GR UR STRIP.AUTO: 1.02 (ref 1–1.03)
UROBILINOGEN UR QL STRIP.AUTO: 0.2 E.U./DL
WBC # BLD AUTO: 11.01 THOUSAND/UL (ref 4.31–10.16)

## 2021-05-31 PROCEDURE — 87651 STREP A DNA AMP PROBE: CPT | Performed by: EMERGENCY MEDICINE

## 2021-05-31 PROCEDURE — 85025 COMPLETE CBC W/AUTO DIFF WBC: CPT | Performed by: EMERGENCY MEDICINE

## 2021-05-31 PROCEDURE — 83690 ASSAY OF LIPASE: CPT | Performed by: EMERGENCY MEDICINE

## 2021-05-31 PROCEDURE — 99283 EMERGENCY DEPT VISIT LOW MDM: CPT

## 2021-05-31 PROCEDURE — 96361 HYDRATE IV INFUSION ADD-ON: CPT

## 2021-05-31 PROCEDURE — U0003 INFECTIOUS AGENT DETECTION BY NUCLEIC ACID (DNA OR RNA); SEVERE ACUTE RESPIRATORY SYNDROME CORONAVIRUS 2 (SARS-COV-2) (CORONAVIRUS DISEASE [COVID-19]), AMPLIFIED PROBE TECHNIQUE, MAKING USE OF HIGH THROUGHPUT TECHNOLOGIES AS DESCRIBED BY CMS-2020-01-R: HCPCS | Performed by: EMERGENCY MEDICINE

## 2021-05-31 PROCEDURE — 81025 URINE PREGNANCY TEST: CPT | Performed by: EMERGENCY MEDICINE

## 2021-05-31 PROCEDURE — 96375 TX/PRO/DX INJ NEW DRUG ADDON: CPT

## 2021-05-31 PROCEDURE — 96374 THER/PROPH/DIAG INJ IV PUSH: CPT

## 2021-05-31 PROCEDURE — U0005 INFEC AGEN DETEC AMPLI PROBE: HCPCS | Performed by: EMERGENCY MEDICINE

## 2021-05-31 PROCEDURE — 81003 URINALYSIS AUTO W/O SCOPE: CPT

## 2021-05-31 PROCEDURE — 99284 EMERGENCY DEPT VISIT MOD MDM: CPT | Performed by: EMERGENCY MEDICINE

## 2021-05-31 PROCEDURE — 80053 COMPREHEN METABOLIC PANEL: CPT | Performed by: EMERGENCY MEDICINE

## 2021-05-31 PROCEDURE — 36415 COLL VENOUS BLD VENIPUNCTURE: CPT | Performed by: EMERGENCY MEDICINE

## 2021-05-31 PROCEDURE — 86308 HETEROPHILE ANTIBODY SCREEN: CPT | Performed by: EMERGENCY MEDICINE

## 2021-05-31 RX ORDER — ONDANSETRON 2 MG/ML
4 INJECTION INTRAMUSCULAR; INTRAVENOUS ONCE
Status: COMPLETED | OUTPATIENT
Start: 2021-05-31 | End: 2021-05-31

## 2021-05-31 RX ORDER — MAGNESIUM HYDROXIDE/ALUMINUM HYDROXICE/SIMETHICONE 120; 1200; 1200 MG/30ML; MG/30ML; MG/30ML
30 SUSPENSION ORAL ONCE
Status: COMPLETED | OUTPATIENT
Start: 2021-05-31 | End: 2021-05-31

## 2021-05-31 RX ORDER — LIDOCAINE HYDROCHLORIDE 20 MG/ML
10 SOLUTION OROPHARYNGEAL ONCE
Status: COMPLETED | OUTPATIENT
Start: 2021-05-31 | End: 2021-05-31

## 2021-05-31 RX ORDER — DEXAMETHASONE SODIUM PHOSPHATE 4 MG/ML
10 INJECTION, SOLUTION INTRA-ARTICULAR; INTRALESIONAL; INTRAMUSCULAR; INTRAVENOUS; SOFT TISSUE ONCE
Status: COMPLETED | OUTPATIENT
Start: 2021-05-31 | End: 2021-05-31

## 2021-05-31 RX ADMIN — ALUMINA, MAGNESIA, AND SIMETHICONE ORAL SUSPENSION REGULAR STRENGTH 30 ML: 1200; 1200; 120 SUSPENSION ORAL at 12:00

## 2021-05-31 RX ADMIN — FAMOTIDINE 20 MG: 10 INJECTION, SOLUTION INTRAVENOUS at 13:34

## 2021-05-31 RX ADMIN — DEXAMETHASONE SODIUM PHOSPHATE 10 MG: 4 INJECTION, SOLUTION INTRAMUSCULAR; INTRAVENOUS at 13:34

## 2021-05-31 RX ADMIN — ONDANSETRON 4 MG: 2 INJECTION INTRAMUSCULAR; INTRAVENOUS at 12:00

## 2021-05-31 RX ADMIN — SODIUM CHLORIDE 1000 ML: 0.9 INJECTION, SOLUTION INTRAVENOUS at 12:01

## 2021-05-31 RX ADMIN — LIDOCAINE HYDROCHLORIDE 10 ML: 20 SOLUTION ORAL; TOPICAL at 12:00

## 2021-05-31 NOTE — ED PROVIDER NOTES
History  Chief Complaint   Patient presents with    Fever - 9 weeks to 74 years     Pt reports dx of Dyana Lindquist on Friday, taking medication as prescribed, states today having low grade fever, "sick to my stomach" and "my throat is so swollen "      Patient is a 23-year-old female presents to the emergency department relating that "less than a week ago I was diagnosed with thrush "  She tells me that her "doctor never followed up with me to see why my immune system was shutting down "  She relates that for the last 2 days she has been "nauseous & throwing up "  Yesterday she began experiencing a "sore throat " She relates that she has "been running low-grade fevers" with T-max of 100 6 degrees over the past couple of days  She has had mild nasal congestion and cough as well  Occasionally sputum is green  At times she vomits & expectorates simultaneously and the content is mostly the nystatin medication  She gestures to the epigastrium and lower mid chest as sites of burning discomfort  She does not have any other chest pain nor dyspnea  She does have some myalgias and generalized fatigue  She denies having abnormal bowel movements or urination  She reports having felt well up until approximately a week ago  She denies awareness of any sick contacts although 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  She has not been sexually active in the last few months and relates that she had been tested for HIV while with her prior partner  She does not have significant concern for having been exposed to this since then  Medical history significant for bipolar disorder and asthma  She occasionally uses an albuterol inhaler though has not done so recently  She is not on any steroid medications for this  Remotely she had had some allergy symptoms though has not in recent seasons  Recently found to have tiny renal cyst on back MRI  Prior to Admission Medications   Prescriptions Last Dose Informant Patient Reported? Taking?    ARIPiprazole (ABILIFY) 5 mg tablet   No No   Sig: Take 1/2 tab po qhs x 1 week, then increase to 1 full tab po qhs   Cholecalciferol ([employer] VITAMIN D3) 1000 units capsule  Self Yes No   Sig: Take 1,000 Units by mouth daily    Ferrous Sulfate (IRON) 325 (65 Fe) MG TABS  Self Yes No   Sig: Take 1 tablet by mouth daily    Loratadine (CLARITIN PO)  Self Yes No   Sig: Take by mouth daily   acetaminophen (TYLENOL) 325 mg tablet  Self No No   Sig: Take 2 tablets (650 mg total) by mouth every 6 (six) hours as needed for mild pain, headaches or fever   Patient not taking: Reported on 5/26/2021   albuterol (PROVENTIL HFA,VENTOLIN HFA) 90 mcg/act inhaler  Self No No   Sig: Inhale 2 puffs every 6 (six) hours as needed for wheezing or shortness of breath   Patient not taking: Reported on 5/26/2021   medroxyPROGESTERone (DEPO-PROVERA) 150 mg/mL injection  Self No No   Sig: Inject 1 mL (150 mg total) into a muscle every 3 (three) months   nystatin (MYCOSTATIN) 500,000 units/5 mL suspension   No No   Sig: Apply 5 mL (500,000 Units total) to the mouth or throat 4 (four) times a day for 7 days   ondansetron (ZOFRAN) 4 mg tablet   No No   Sig: Take 1 tablet (4 mg total) by mouth every 8 (eight) hours as needed for nausea or vomiting   tobramycin (TOBREX) 0 3 % SOLN  Self No No   Sig: Administer 1 drop to the right eye every 4 (four) hours while awake   Patient not taking: Reported on 4/17/2021      Facility-Administered Medications Last Administration Doses Remaining   medroxyPROGESTERone (DEPO-PROVERA) IM injection 150 mg 10/1/2020  1:16 PM           Past Medical History:   Diagnosis Date    Allergic     pollen; animal dander    Anemia     Anxiety     Asthma     Closed left ankle fracture     Depression     Dysmenorrhea     Influenza A 2/28/2020    Insomnia     Moderate single current episode of major depressive disorder ([facility] [address] ) 1/5/2017       Past Surgical History:   Procedure Laterality Date    ANKLE SURGERY Left     two screws       Family History   Problem Relation Age of Onset    Bipolar disorder Mother         Fentanyl per Pt    Drug abuse Mother     Anxiety disorder Sister     Bipolar disorder Sister     Autism spectrum disorder Cousin     Leukemia Paternal Aunt     No Known Problems Father     Bipolar disorder Half-Sister      I have reviewed and agree with the history as documented  E-Cigarette/Vaping    E-Cigarette Use Former User      E-Cigarette/Vaping Substances    Nicotine No     THC No     CBD No     Flavoring No     Other No     Unknown No      Social History     Tobacco Use    Smoking status: Former Smoker     Packs/day: 0 50     Years: 7 00     Pack years: 3 50     Types: Cigarettes     Quit date: 2021     Years since quittin 3    Smokeless tobacco: Never Used   Substance Use Topics    Alcohol use: Not Currently     Comment: 1 glass of wine approx 1-2x weekly  Pt denies h/o abuse    Drug use: Yes     Types: Marijuana     Comment: Smokes THC 2x per week  Review of Systems   Constitutional: Positive for activity change, appetite change, fatigue and fever  HENT: Negative for ear pain  Respiratory: Negative for chest tightness and shortness of breath  Cardiovascular: Positive for chest pain  Negative for palpitations  Gastrointestinal: Positive for abdominal pain  Genitourinary: Positive for vaginal bleeding (Has had some recent spotting  Takes Depo regularly)  Negative for dysuria and frequency  Musculoskeletal: Positive for neck pain  All other systems reviewed and are negative  Physical Exam  Physical Exam  Vitals signs and nursing note reviewed  Constitutional:       Appearance: Normal appearance        Comments: Appears malaised   HENT:      Right Ear: Tympanic membrane, ear canal and external ear normal       Left Ear: Tympanic membrane, ear canal and external ear normal       Nose: Nose normal       Mouth/Throat:      Mouth: Mucous membranes are moist       Comments: Uvula and tonsils appear mildly edematous  Oropharynx visualized has mild cobblestoning  I do not appreciate any exudates nor tongue lesions  No thrush appreciated  Eyes:      General: No scleral icterus  Extraocular Movements: Extraocular movements intact  Conjunctiva/sclera: Conjunctivae normal    Neck:      Musculoskeletal: Normal range of motion and neck supple  Cardiovascular:      Rate and Rhythm: Normal rate and regular rhythm  Pulmonary:      Effort: Pulmonary effort is normal       Breath sounds: Normal breath sounds  Abdominal:      Palpations: Abdomen is soft  Tenderness: There is abdominal tenderness (Minimally epigastric)  Musculoskeletal: Normal range of motion  Right lower leg: No edema  Left lower leg: No edema  Lymphadenopathy:      Cervical: Cervical adenopathy ( tender bilateral anterior nodes) present  Skin:     General: Skin is warm and dry  Neurological:      General: No focal deficit present  Mental Status: She is alert and oriented to person, place, and time     Psychiatric:         Mood and Affect: Mood normal          Behavior: Behavior normal          Vital Signs  ED Triage Vitals [05/31/21 1052]   Temperature Pulse Respirations Blood Pressure SpO2   98 6 °F (37 °C) 96 18 113/82 98 %      Temp Source Heart Rate Source Patient Position - Orthostatic VS BP Location FiO2 (%)   Oral Monitor Sitting Right arm --      Pain Score       7           Vitals:    05/31/21 1052 05/31/21 1217   BP: 113/82 117/78   Pulse: 96 88   Patient Position - Orthostatic VS: Sitting Lying         Visual Acuity      ED Medications  Medications   ondansetron (ZOFRAN) injection 4 mg (4 mg Intravenous Given 5/31/21 1200)   sodium chloride 0 9 % bolus 1,000 mL (0 mL Intravenous Stopped 5/31/21 1301)   aluminum-magnesium hydroxide-simethicone (MYLANTA) oral suspension 30 mL (30 mL Oral Given 5/31/21 1200)   Lidocaine Viscous HCl (XYLOCAINE) 2 % mucosal solution 10 mL (10 mL Swish & Swallow Given 5/31/21 1200)   famotidine (PEPCID) injection 20 mg (20 mg Intravenous Given 5/31/21 1334)   dexamethasone (DECADRON) injection 10 mg (10 mg Intravenous Given 5/31/21 1334)       Diagnostic Studies  Results Reviewed     Procedure Component Value Units Date/Time    POCT pregnancy, urine [[id number]]  (Normal) Resulted: 05/31/21 1436    Lab Status: Final result Updated: 05/31/21 1436     EXT PREG TEST UR (Ref: Negative) negative     Control valid    Urine Macroscopic, POC [[id number]] Collected: 05/31/21 1430    Lab Status: Final result Specimen: Urine Updated: 05/31/21 1431     Color, UA Yellow     Clarity, UA Clear     pH, UA 7 0     Leukocytes, UA Negative     Nitrite, UA Negative     Protein, UA Negative mg/dl      Glucose, UA Negative mg/dl      Ketones, UA Negative mg/dl      Urobilinogen, UA 0 2 E U /dl      Bilirubin, UA Negative     Blood, UA Negative     Specific Gravity, UA 1 025    Narrative:      CLINITEK RESULT    Strep A PCR [[id number]]  (Normal) Collected: 05/31/21 1331    Lab Status: Final result Specimen: Throat Updated: 05/31/21 1419     STREP A PCR None Detected    Novel Coronavirus (Covid-19),PCR [facility] - 2 Hour Stat [[id number]]  (Normal) Collected: 05/31/21 1153    Lab Status: Final result Specimen: Nares from Nose Updated: 05/31/21 1253     SARS-CoV-2 Negative    Narrative: The specimen collection materials, transport medium, and/or testing methodology utilized in the production of these test results have been proven to be reliable in a limited validation with an abbreviated program under the Emergency Utilization Authorization provided by the FDA  Testing reported as "Presumptive positive" will be confirmed with secondary testing to ensure result accuracy  Clinical caution and judgement should be used with the interpretation of these results with consideration of the clinical impression and other laboratory testing    Testing reported as "Positive" or "Negative" has been proven to be accurate according to standard laboratory validation requirements  All testing is performed with control materials showing appropriate reactivity at standard intervals        Lipase [[id number]]  (Abnormal) Collected: 05/31/21 1153    Lab Status: Final result Specimen: Blood from Arm, Right Updated: 05/31/21 1221     Lipase 51 u/L     Comprehensive metabolic panel [[id number]] Collected: 05/31/21 1153    Lab Status: Final result Specimen: Blood from Arm, Right Updated: 05/31/21 1221     Sodium 141 mmol/L      Potassium 4 3 mmol/L      Chloride 105 mmol/L      CO2 29 mmol/L      ANION GAP 7 mmol/L      BUN 5 mg/dL      Creatinine 0 69 mg/dL      Glucose 105 mg/dL      Calcium 9 5 mg/dL      AST 9 U/L      ALT 16 U/L      Alkaline Phosphatase 60 U/L      Total Protein 7 6 g/dL      Albumin 4 0 g/dL      Total Bilirubin 0 48 mg/dL      eGFR 127 ml/min/1 73sq m     Narrative:      [place] guidelines for Chronic Kidney Disease (CKD):     Stage 1 with normal or high GFR (GFR > 90 mL/min/1 73 square meters)    Stage 2 Mild CKD (GFR = 60-89 mL/min/1 73 square meters)    Stage 3A Moderate CKD (GFR = 45-59 mL/min/1 73 square meters)    Stage 3B Moderate CKD (GFR = 30-44 mL/min/1 73 square meters)    Stage 4 Severe CKD (GFR = 15-29 mL/min/1 73 square meters)    Stage 5 End Stage CKD (GFR <15 mL/min/1 73 square meters)  Note: GFR calculation is accurate only with a steady state creatinine    CBC and differential [[id number]]  (Abnormal) Collected: 05/31/21 1153    Lab Status: Final result Specimen: Blood from Arm, Right Updated: 05/31/21 1204     WBC 11 01 Thousand/uL      RBC 4 93 Million/uL      Hemoglobin 14 1 g/dL      Hematocrit 42 6 %      MCV 86 fL      MCH 28 6 pg      MCHC 33 1 g/dL      RDW 13 0 %      MPV 10 0 fL      Platelets 377 Thousands/uL      nRBC 0 /100 WBCs      Neutrophils Relative 78 %      Immat GRANS % 0 %      Lymphocytes Relative 14 %      Monocytes Relative 7 %      Eosinophils Relative 1 %      Basophils Relative 0 %      Neutrophils Absolute 8 58 Thousands/µL      Immature Grans Absolute 0 03 Thousand/uL      Lymphocytes Absolute 1 49 Thousands/µL      Monocytes Absolute 0 75 Thousand/µL      Eosinophils Absolute 0 12 Thousand/µL      Basophils Absolute 0 04 Thousands/µL     Mononucleosis screen [[id number]] Collected: 05/31/21 1152    Lab Status: In process Specimen: Blood from Arm, Right Updated: 05/31/21 1200                 No orders to display              Procedures  Procedures         ED Course  ED Course as of May 31 2318   Mon May 31, 2021   1320 Patient is still reporting upper abdominal discomfort  Nausea has calmed  She feels up to eating but is uncertain how her stomach react to this  We reviewed test results which have returned thus far  COVID test is negative  She has minimal leukocytosis which is nonspecific, normal chemistry and lipase  Suspect viral etiology to majority of symptoms and indicated that supportive care - fluid/rest will be recommended  Famotidine ordered here to assist with stomach pain and dexamethasone for throat discomfort  Mononucleosis and strep tests pending  Will provide note for work the next couple of days  Patient anticipates following up with PCP       0571 Patient tolerating p o  She describes that her stomach is growling although she does not feel particularly hungry  She does not feel as though she will vomit the crackers consumed  Urine specimen at bedside for testing  Anticipate discharge shortly                                                MDM    Disposition  Final diagnoses:   Viral syndrome     Time reflects when diagnosis was documented in both MDM as applicable and the Disposition within this note     Time User Action Codes Description Comment    5/31/2021  1:59 PM Jodi FALL Add [B34 9] Viral syndrome       ED Disposition     ED Disposition Condition Date/Time Comment    Discharge Stable Mon May 31, 2021  1:59 PM Keyla Letters discharge to home/self care  Follow-up Information     Follow up With Specialties Details Why Contact Anjali Guerra DO Family Medicine Schedule an appointment as soon as possible for a visit   [facility] [address]  Close  [phone]            Discharge Medication List as of 5/31/2021  2:34 PM      CONTINUE these medications which have NOT CHANGED    Details   acetaminophen (TYLENOL) 325 mg tablet Take 2 tablets (650 mg total) by mouth every 6 (six) hours as needed for mild pain, headaches or fever, Starting Sun 3/1/2020, No Print      albuterol (PROVENTIL HFA,VENTOLIN HFA) 90 mcg/act inhaler Inhale 2 puffs every 6 (six) hours as needed for wheezing or shortness of breath, Starting Fri 2/7/2020, Normal      ARIPiprazole (ABILIFY) 5 mg tablet Take 1/2 tab po qhs x 1 week, then increase to 1 full tab po qhs, Normal      Cholecalciferol ([employer] VITAMIN D3) 1000 units capsule Take 1,000 Units by mouth daily , Historical Med      Ferrous Sulfate (IRON) 325 (65 Fe) MG TABS Take 1 tablet by mouth daily , Historical Med      Loratadine (CLARITIN PO) Take by mouth daily, Historical Med      medroxyPROGESTERone (DEPO-PROVERA) 150 mg/mL injection Inject 1 mL (150 mg total) into a muscle every 3 (three) months, Starting Tue 7/7/2020, Normal      nystatin (MYCOSTATIN) 500,000 units/5 mL suspension Apply 5 mL (500,000 Units total) to the mouth or throat 4 (four) times a day for 7 days, Starting Wed 5/26/2021, Until Wed 6/2/2021, Normal      ondansetron (ZOFRAN) 4 mg tablet Take 1 tablet (4 mg total) by mouth every 8 (eight) hours as needed for nausea or vomiting, Starting Wed 5/26/2021, Normal      tobramycin (TOBREX) 0 3 % SOLN Administer 1 drop to the right eye every 4 (four) hours while awake, Starting Thu 12/17/2020, Normal           No discharge procedures on file      PDMP Review       Value Time User PDMP Reviewed  Yes 11/29/2019 10:26 PM Haritha Belle MD          ED Provider  Electronically Signed by           Flor Almeida MD  05/31/21 0301

## 2021-05-31 NOTE — DISCHARGE INSTRUCTIONS
Drink plenty of fluids to stay well hydrated  You may take zofran 4mg as previously prescribed to help with nausea  Gradually advance your diet to plain foods & ultimately regular foods over the next few days as you feel improved  You may use acetaminophen and ibuprofen as directed on over-the-counter packaging for discomfort/fever

## 2021-05-31 NOTE — Clinical Note
April Gonzalez was seen and treated in our emergency department on 5/31/2021  Diagnosis:     Susana    She may return on this date: May return 6/3-5, as improved  If you have any questions or concerns, please don't hesitate to call        Julio Church MD    ______________________________           _______________          _______________  Hospital Representative                              Date                                Time

## 2021-06-01 LAB — HETEROPH AB SER QL: NEGATIVE

## 2021-06-03 ENCOUNTER — APPOINTMENT (EMERGENCY)
Dept: CT IMAGING | Facility: HOSPITAL | Age: 21
End: 2021-06-03
Payer: COMMERCIAL

## 2021-06-03 ENCOUNTER — HOSPITAL ENCOUNTER (EMERGENCY)
Facility: HOSPITAL | Age: 21
Discharge: HOME | End: 2021-06-03
Attending: EMERGENCY MEDICINE | Admitting: EMERGENCY MEDICINE
Payer: COMMERCIAL

## 2021-06-03 VITALS
TEMPERATURE: 99 F | OXYGEN SATURATION: 95 % | DIASTOLIC BLOOD PRESSURE: 69 MMHG | HEIGHT: 69 IN | WEIGHT: 224.87 LBS | SYSTOLIC BLOOD PRESSURE: 117 MMHG | HEART RATE: 86 BPM | BODY MASS INDEX: 33.31 KG/M2 | RESPIRATION RATE: 18 BRPM

## 2021-06-03 DIAGNOSIS — J03.90 TONSILLITIS: Primary | ICD-10-CM

## 2021-06-03 DIAGNOSIS — R11.2 NAUSEA AND VOMITING: ICD-10-CM

## 2021-06-03 LAB
ALBUMIN SERPL BCP-MCNC: 3.5 G/DL (ref 3.5–5)
ALP SERPL-CCNC: 58 U/L (ref 46–384)
ALT SERPL W P-5'-P-CCNC: 26 U/L (ref 12–78)
ANION GAP SERPL CALCULATED.3IONS-SCNC: 13 MMOL/L (ref 4–13)
APTT PPP: 35 SECONDS (ref 23–37)
AST SERPL W P-5'-P-CCNC: 10 U/L (ref 5–45)
BASOPHILS # BLD AUTO: 0.03 THOUSANDS/ΜL (ref 0–0.1)
BASOPHILS NFR BLD AUTO: 0 % (ref 0–1)
BILIRUB SERPL-MCNC: 0.42 MG/DL (ref 0.2–1)
BILIRUB UR QL STRIP: NEGATIVE
BUN SERPL-MCNC: 10 MG/DL (ref 5–25)
CALCIUM SERPL-MCNC: 8.6 MG/DL (ref 8.3–10.1)
CHLORIDE SERPL-SCNC: 104 MMOL/L (ref 100–108)
CLARITY UR: CLEAR
CO2 SERPL-SCNC: 24 MMOL/L (ref 21–32)
COLOR UR: YELLOW
CREAT SERPL-MCNC: 0.69 MG/DL (ref 0.6–1.3)
EOSINOPHIL # BLD AUTO: 0.09 THOUSAND/ΜL (ref 0–0.61)
EOSINOPHIL NFR BLD AUTO: 1 % (ref 0–6)
ERYTHROCYTE [DISTWIDTH] IN BLOOD BY AUTOMATED COUNT: 13 % (ref 11.6–15.1)
GFR SERPL CREATININE-BSD FRML MDRD: 127 ML/MIN/1.73SQ M
GLUCOSE SERPL-MCNC: 100 MG/DL (ref 65–140)
GLUCOSE UR STRIP-MCNC: NEGATIVE MG/DL
HCT VFR BLD AUTO: 39.7 % (ref 34.8–46.1)
HGB BLD-MCNC: 13.6 G/DL (ref 11.5–15.4)
HGB UR QL STRIP.AUTO: NEGATIVE
IMM GRANULOCYTES # BLD AUTO: 0.05 THOUSAND/UL (ref 0–0.2)
IMM GRANULOCYTES NFR BLD AUTO: 0 % (ref 0–2)
INR PPP: 1.15 (ref 0.84–1.19)
KETONES UR STRIP-MCNC: NEGATIVE MG/DL
LEUKOCYTE ESTERASE UR QL STRIP: NEGATIVE
LIPASE SERPL-CCNC: 58 U/L (ref 73–393)
LYMPHOCYTES # BLD AUTO: 1.8 THOUSANDS/ΜL (ref 0.6–4.47)
LYMPHOCYTES NFR BLD AUTO: 14 % (ref 14–44)
MCH RBC QN AUTO: 28.8 PG (ref 26.8–34.3)
MCHC RBC AUTO-ENTMCNC: 34.3 G/DL (ref 31.4–37.4)
MCV RBC AUTO: 84 FL (ref 82–98)
MONOCYTES # BLD AUTO: 1.19 THOUSAND/ΜL (ref 0.17–1.22)
MONOCYTES NFR BLD AUTO: 9 % (ref 4–12)
NEUTROPHILS # BLD AUTO: 10 THOUSANDS/ΜL (ref 1.85–7.62)
NEUTS SEG NFR BLD AUTO: 76 % (ref 43–75)
NITRITE UR QL STRIP: NEGATIVE
NRBC BLD AUTO-RTO: 0 /100 WBCS
PH UR STRIP.AUTO: 7 [PH]
PLATELET # BLD AUTO: 314 THOUSANDS/UL (ref 149–390)
PMV BLD AUTO: 10.5 FL (ref 8.9–12.7)
POTASSIUM SERPL-SCNC: 3.6 MMOL/L (ref 3.5–5.3)
PROT SERPL-MCNC: 7.1 G/DL (ref 6.4–8.2)
PROT UR STRIP-MCNC: NEGATIVE MG/DL
PROTHROMBIN TIME: 14.2 SECONDS (ref 11.6–14.5)
RBC # BLD AUTO: 4.72 MILLION/UL (ref 3.81–5.12)
S PYO DNA THROAT QL NAA+PROBE: NORMAL
SODIUM SERPL-SCNC: 141 MMOL/L (ref 136–145)
SP GR UR STRIP.AUTO: 1.01 (ref 1–1.03)
UROBILINOGEN UR QL STRIP.AUTO: 0.2 E.U./DL
WBC # BLD AUTO: 13.16 THOUSAND/UL (ref 4.31–10.16)

## 2021-06-03 PROCEDURE — 99284 EMERGENCY DEPT VISIT MOD MDM: CPT

## 2021-06-03 PROCEDURE — 83690 ASSAY OF LIPASE: CPT

## 2021-06-03 PROCEDURE — 85730 THROMBOPLASTIN TIME PARTIAL: CPT | Performed by: PHYSICIAN ASSISTANT

## 2021-06-03 PROCEDURE — 96372 THER/PROPH/DIAG INJ SC/IM: CPT

## 2021-06-03 PROCEDURE — 96365 THER/PROPH/DIAG IV INF INIT: CPT

## 2021-06-03 PROCEDURE — 87651 STREP A DNA AMP PROBE: CPT | Performed by: PHYSICIAN ASSISTANT

## 2021-06-03 PROCEDURE — 74177 CT ABD & PELVIS W/CONTRAST: CPT

## 2021-06-03 PROCEDURE — 96375 TX/PRO/DX INJ NEW DRUG ADDON: CPT

## 2021-06-03 PROCEDURE — 80053 COMPREHEN METABOLIC PANEL: CPT

## 2021-06-03 PROCEDURE — 81003 URINALYSIS AUTO W/O SCOPE: CPT

## 2021-06-03 PROCEDURE — 85610 PROTHROMBIN TIME: CPT | Performed by: PHYSICIAN ASSISTANT

## 2021-06-03 PROCEDURE — 99284 EMERGENCY DEPT VISIT MOD MDM: CPT | Performed by: PHYSICIAN ASSISTANT

## 2021-06-03 PROCEDURE — C9113 INJ PANTOPRAZOLE SODIUM, VIA: HCPCS | Performed by: PHYSICIAN ASSISTANT

## 2021-06-03 PROCEDURE — 36415 COLL VENOUS BLD VENIPUNCTURE: CPT

## 2021-06-03 PROCEDURE — 70491 CT SOFT TISSUE NECK W/DYE: CPT

## 2021-06-03 PROCEDURE — 85025 COMPLETE CBC W/AUTO DIFF WBC: CPT

## 2021-06-03 PROCEDURE — 87070 CULTURE OTHR SPECIMN AEROBIC: CPT | Performed by: PHYSICIAN ASSISTANT

## 2021-06-03 RX ORDER — LIDOCAINE HYDROCHLORIDE 20 MG/ML
15 SOLUTION OROPHARYNGEAL ONCE
Status: COMPLETED | OUTPATIENT
Start: 2021-06-03 | End: 2021-06-03

## 2021-06-03 RX ORDER — AMOXICILLIN 500 MG/1
500 CAPSULE ORAL EVERY 12 HOURS SCHEDULED
Qty: 20 CAPSULE | Refills: 0 | Status: SHIPPED | OUTPATIENT
Start: 2021-06-03 | End: 2021-06-13

## 2021-06-03 RX ORDER — ONDANSETRON 2 MG/ML
4 INJECTION INTRAMUSCULAR; INTRAVENOUS ONCE
Status: COMPLETED | OUTPATIENT
Start: 2021-06-03 | End: 2021-06-03

## 2021-06-03 RX ORDER — MAGNESIUM HYDROXIDE/ALUMINUM HYDROXICE/SIMETHICONE 120; 1200; 1200 MG/30ML; MG/30ML; MG/30ML
30 SUSPENSION ORAL ONCE
Status: COMPLETED | OUTPATIENT
Start: 2021-06-03 | End: 2021-06-03

## 2021-06-03 RX ORDER — ONDANSETRON 2 MG/ML
INJECTION INTRAMUSCULAR; INTRAVENOUS
Status: COMPLETED
Start: 2021-06-03 | End: 2021-06-03

## 2021-06-03 RX ORDER — PANTOPRAZOLE SODIUM 40 MG/1
40 INJECTION, POWDER, FOR SOLUTION INTRAVENOUS ONCE
Status: COMPLETED | OUTPATIENT
Start: 2021-06-03 | End: 2021-06-03

## 2021-06-03 RX ORDER — DEXAMETHASONE SODIUM PHOSPHATE 10 MG/ML
10 INJECTION, SOLUTION INTRAMUSCULAR; INTRAVENOUS ONCE
Status: COMPLETED | OUTPATIENT
Start: 2021-06-03 | End: 2021-06-03

## 2021-06-03 RX ORDER — HALOPERIDOL 5 MG/ML
5 INJECTION INTRAMUSCULAR ONCE
Status: COMPLETED | OUTPATIENT
Start: 2021-06-03 | End: 2021-06-03

## 2021-06-03 RX ADMIN — DEXAMETHASONE SODIUM PHOSPHATE 10 MG: 10 INJECTION, SOLUTION INTRAMUSCULAR; INTRAVENOUS at 04:26

## 2021-06-03 RX ADMIN — LIDOCAINE HYDROCHLORIDE 15 ML: 20 SOLUTION ORAL; TOPICAL at 04:26

## 2021-06-03 RX ADMIN — SODIUM CHLORIDE 3 G: 9 INJECTION, SOLUTION INTRAVENOUS at 05:19

## 2021-06-03 RX ADMIN — HALOPERIDOL LACTATE 5 MG: 5 INJECTION, SOLUTION INTRAMUSCULAR at 04:26

## 2021-06-03 RX ADMIN — PANTOPRAZOLE SODIUM 40 MG: 40 INJECTION, POWDER, FOR SOLUTION INTRAVENOUS at 04:26

## 2021-06-03 RX ADMIN — IOHEXOL 100 ML: 350 INJECTION, SOLUTION INTRAVENOUS at 04:31

## 2021-06-03 RX ADMIN — ALUMINA, MAGNESIA, AND SIMETHICONE ORAL SUSPENSION REGULAR STRENGTH 30 ML: 1200; 1200; 120 SUSPENSION ORAL at 04:26

## 2021-06-03 NOTE — DISCHARGE INSTRUCTIONS
Encouraged strong hydration with warm fluids with honey  Take antibiotic course as directed  Follow-up with ear nose throat for re-evaluation  Please return immediately to emergency department experience new or worsening symptoms as discussed

## 2021-06-03 NOTE — ED PROVIDER NOTES
History  Chief Complaint   Patient presents with    Vomiting     pt brought by ems for N&V since 5/31 for same, pt was diagnosed with thrush at that time  ems gave 4mg zofran w/relief  no fever  Shaan Almanza is a 24year-old female with past medical history including allergies, anemia, anxiety and depression arriving to the emergency department via EMS for evaluation of ongoing generalized sore throat  The patient reports that symptoms had started approximately 2 weeks ago following the diagnosis of thrush, with the patient noting that she is currently continuing with nystatin  Per review of emr, the patient was previously evaluated for these complaints on 5/31/21 at which time strep, mono, and covid testing were pursued which had resulted negative  Patient presently denies trismus, drooling, trouble tolerating oral secretions, voice change or hoarseness of voice, midline neck pain/stiffness  She states that over the past couple of days, she has been experiencing nausea with numerous episodes of vomiting, noting that she has not been able to keep much food and liquids down  The patient admits that she has been noticing blood-tinged sputum, as well as small blood clots in her vomit  She reports associated epigastric discomfort  Patient reports tmax 101 F oral on 05/31/2021  She is afebrile on arrival and states that she did not take any antipyretic medication prior to hospital visit today  She denies any associated urinary complaints, including dysuria, urgency, frequency, hematuria, flank pain or back pain  She denies recent travel or recent antibiotic use  She has no pertinent surgical history  Prior to Admission Medications   Prescriptions Last Dose Informant Patient Reported? Taking?    ARIPiprazole (ABILIFY) 5 mg tablet   No No   Sig: Take 1/2 tab po qhs x 1 week, then increase to 1 full tab po qhs   Ferrous Sulfate (IRON) 325 (65 Fe) MG TABS Unknown at Unknown time Self Yes No   Sig: Take 1 tablet by mouth daily    Loratadine (CLARITIN PO) More than a month at Unknown time Self Yes No   Sig: Take by mouth daily   nystatin (MYCOSTATIN) 500,000 units/5 mL suspension 2021 at Unknown time  No Yes   Sig: Apply 5 mL (500,000 Units total) to the mouth or throat 4 (four) times a day for 7 days   ondansetron (ZOFRAN) 4 mg tablet 6/3/2021 at Unknown time  No Yes   Sig: Take 1 tablet (4 mg total) by mouth every 8 (eight) hours as needed for nausea or vomiting      Facility-Administered Medications Last Administration Doses Remaining   medroxyPROGESTERone (DEPO-PROVERA) IM injection 150 mg 10/1/2020  1:16 PM           Past Medical History:   Diagnosis Date    Allergic     pollen; animal dander    Anemia     Anxiety     Asthma     Closed left ankle fracture     Depression     Dysmenorrhea     Influenza A 2020    Insomnia     Moderate single current episode of major depressive disorder ([facility] [address] ) 2017       Past Surgical History:   Procedure Laterality Date    ANKLE SURGERY Left     two screws       Family History   Problem Relation Age of Onset    Bipolar disorder Mother         Fentanyl per Pt    Drug abuse Mother     Anxiety disorder Sister     Bipolar disorder Sister     Autism spectrum disorder Cousin     Leukemia Paternal Aunt     No Known Problems Father     Bipolar disorder Half-Sister      I have reviewed and agree with the history as documented  E-Cigarette/Vaping    E-Cigarette Use Former User      E-Cigarette/Vaping Substances    Nicotine No     THC No     CBD No     Flavoring No     Other No     Unknown No      Social History     Tobacco Use    Smoking status: Current Some Day Smoker     Packs/day: 0 50     Years: 7 00     Pack years: 3 50     Types: Cigarettes     Last attempt to quit: 2021     Years since quittin 3    Smokeless tobacco: Never Used   Substance Use Topics    Alcohol use: Not Currently     Comment: 1 glass of wine approx 1-2x weekly  Pt denies h/o abuse    Drug use: Yes     Types: Marijuana     Comment: medical marijuana       Review of Systems   Constitutional: Positive for fever  Negative for activity change, appetite change and chills  HENT: Positive for sore throat  Respiratory: Negative for shortness of breath  Gastrointestinal: Positive for abdominal pain, nausea and vomiting  Genitourinary: Negative for difficulty urinating, dysuria, flank pain, frequency and urgency  Skin: Negative for pallor and rash  All other systems reviewed and are negative  Physical Exam  Physical Exam  Vitals signs and nursing note reviewed  Constitutional:       General: She is not in acute distress  Appearance: Normal appearance  She is well-developed  She is not ill-appearing, toxic-appearing or diaphoretic  HENT:      Head: Normocephalic and atraumatic  Mouth/Throat:      Lips: Pink  Mouth: Mucous membranes are moist       Pharynx: Oropharynx is clear  Uvula midline  No pharyngeal swelling, oropharyngeal exudate or uvula swelling  Tonsils: No tonsillar exudate or tonsillar abscesses  Comments: Mild posterior pharyngeal erythema with minimal b/l tonsillar hypertrophy  No thrush or oral lesions appreciated  No uvula or soft palate deviation  Patient speaking in full sentences, tolerating oral secretions  Eyes:      Conjunctiva/sclera: Conjunctivae normal    Neck:      Musculoskeletal: Full passive range of motion without pain, normal range of motion and neck supple  Normal range of motion  No erythema, neck rigidity, crepitus, pain with movement, spinous process tenderness or muscular tenderness  Trachea: Trachea and phonation normal  No tracheal tenderness, abnormal tracheal secretions or tracheal deviation  Comments: No meningismus  Cardiovascular:      Rate and Rhythm: Normal rate and regular rhythm  Heart sounds: Normal heart sounds     Pulmonary:      Effort: Pulmonary effort is normal  No respiratory distress  Breath sounds: Normal breath sounds  No wheezing  Abdominal:      General: There is no distension  Palpations: Abdomen is soft  Tenderness: There is abdominal tenderness (mild) in the epigastric area  There is no guarding or rebound  Musculoskeletal: Normal range of motion  Lymphadenopathy:      Cervical: Cervical adenopathy present  Skin:     General: Skin is warm and dry  Capillary Refill: Capillary refill takes less than 2 seconds  Coloration: Skin is not pale  Findings: No erythema or rash  Neurological:      General: No focal deficit present  Mental Status: She is alert  Mental status is at baseline  Motor: Motor function is intact  No weakness  Gait: Gait is intact     Psychiatric:         Mood and Affect: Mood normal       Comments: Anxious appearing               Vital Signs  ED Triage Vitals   Temperature Pulse Respirations Blood Pressure SpO2   06/03/21 0310 06/03/21 0301 06/03/21 0301 06/03/21 0303 06/03/21 0301   99 °F (37 2 °C) 91 20 116/73 99 %      Temp Source Heart Rate Source Patient Position - Orthostatic VS BP Location FiO2 (%)   06/03/21 0310 06/03/21 0301 06/03/21 0330 06/03/21 0330 --   Oral Monitor Lying Right arm       Pain Score       06/03/21 0301       8           Vitals:    06/03/21 0301 06/03/21 0303 06/03/21 0330 06/03/21 0445   BP:  116/73 117/72 117/69   Pulse: 91  90 86   Patient Position - Orthostatic VS:   Lying Lying         Visual Acuity      ED Medications  Medications   ondansetron (ZOFRAN) injection 4 mg (0 mg Intravenous Override Pull 6/3/21 0248)   ondansetron (ZOFRAN) 4 mg/2 mL injection **ADS Override Pull** (  Given to EMS 6/3/21 0306)   dexamethasone (PF) (DECADRON) injection 10 mg (10 mg Intravenous Given 6/3/21 0426)   pantoprazole (PROTONIX) injection 40 mg (40 mg Intravenous Given 6/3/21 0426)   Lidocaine Viscous HCl (XYLOCAINE) 2 % mucosal solution 15 mL (15 mL Swish & Swallow Given 6/3/21 1290)   aluminum-magnesium hydroxide-simethicone (MYLANTA) oral suspension 30 mL (30 mL Oral Given 6/3/21 0426)   haloperidol lactate (HALDOL) injection 5 mg (5 mg Intramuscular Given 6/3/21 0426)   iohexol (OMNIPAQUE) 350 MG/ML injection (SINGLE-DOSE) 100 mL (100 mL Intravenous Given 6/3/21 0431)   ampicillin-sulbactam (UNASYN) 3 g in sodium chloride 0 9 % 100 mL IVPB (0 g Intravenous Stopped 6/3/21 0549)       Diagnostic Studies  Results Reviewed     Procedure Component Value Units Date/Time    Throat culture [[id number]] Collected: 06/03/21 0445    Lab Status: Final result Specimen: Throat Updated: 06/06/21 1242     Throat Culture Negative for beta-hemolytic Streptococcus    UA w Reflex to Microscopic w Reflex to Culture [[id number]] Collected: 06/03/21 0810    Lab Status: Final result Specimen: Urine, Clean Catch Updated: 06/03/21 0631     Color, UA Yellow     Clarity, UA Clear     Specific Gravity, UA 1 010     pH, UA 7 0     Leukocytes, UA Negative     Nitrite, UA Negative     Protein, UA Negative mg/dl      Glucose, UA Negative mg/dl      Ketones, UA Negative mg/dl      Urobilinogen, UA 0 2 E U /dl      Bilirubin, UA Negative     Blood, UA Negative    Strep A PCR [[id number]]  (Normal) Collected: 06/03/21 0445    Lab Status: Final result Specimen: Throat Updated: 06/03/21 0517     STREP A PCR None Detected    Protime-INR [[id number]]  (Normal) Collected: 06/03/21 0344    Lab Status: Final result Specimen: Blood from Arm, Left Updated: 06/03/21 0403     Protime 14 2 seconds      INR 1 15    APTT [[id number]]  (Normal) Collected: 06/03/21 0344    Lab Status: Final result Specimen: Blood from Arm, Left Updated: 06/03/21 0403     PTT 35 seconds     Comprehensive metabolic panel [[id number]] Collected: 06/03/21 0322    Lab Status: Final result Specimen: Blood from Arm, Left Updated: 06/03/21 0340     Sodium 141 mmol/L      Potassium 3 6 mmol/L      Chloride 104 mmol/L      CO2 24 mmol/L      ANION GAP 13 mmol/L      BUN 10 mg/dL      Creatinine 0 69 mg/dL      Glucose 100 mg/dL      Calcium 8 6 mg/dL      AST 10 U/L      ALT 26 U/L      Alkaline Phosphatase 58 U/L      Total Protein 7 1 g/dL      Albumin 3 5 g/dL      Total Bilirubin 0 42 mg/dL      eGFR 127 ml/min/1 73sq m     Narrative:      [place] guidelines for Chronic Kidney Disease (CKD):     Stage 1 with normal or high GFR (GFR > 90 mL/min/1 73 square meters)    Stage 2 Mild CKD (GFR = 60-89 mL/min/1 73 square meters)    Stage 3A Moderate CKD (GFR = 45-59 mL/min/1 73 square meters)    Stage 3B Moderate CKD (GFR = 30-44 mL/min/1 73 square meters)    Stage 4 Severe CKD (GFR = 15-29 mL/min/1 73 square meters)    Stage 5 End Stage CKD (GFR <15 mL/min/1 73 square meters)  Note: GFR calculation is accurate only with a steady state creatinine    Lipase [[id number]]  (Abnormal) Collected: 06/03/21 0322    Lab Status: Final result Specimen: Blood from Arm, Left Updated: 06/03/21 0340     Lipase 58 u/L     CBC and differential [[id number]]  (Abnormal) Collected: 06/03/21 0322    Lab Status: Final result Specimen: Blood from Arm, Left Updated: 06/03/21 0327     WBC 13 16 Thousand/uL      RBC 4 72 Million/uL      Hemoglobin 13 6 g/dL      Hematocrit 39 7 %      MCV 84 fL      MCH 28 8 pg      MCHC 34 3 g/dL      RDW 13 0 %      MPV 10 5 fL      Platelets 596 Thousands/uL      nRBC 0 /100 WBCs      Neutrophils Relative 76 %      Immat GRANS % 0 %      Lymphocytes Relative 14 %      Monocytes Relative 9 %      Eosinophils Relative 1 %      Basophils Relative 0 %      Neutrophils Absolute 10 00 Thousands/µL      Immature Grans Absolute 0 05 Thousand/uL      Lymphocytes Absolute 1 80 Thousands/µL      Monocytes Absolute 1 19 Thousand/µL      Eosinophils Absolute 0 09 Thousand/µL      Basophils Absolute 0 03 Thousands/µL                  CT soft tissue neck with contrast   Final Result by Dianne Mathew MD (06/03 5033)      Findings as above compatible with acute tonsillitis  No definite discrete collection to suggest abscess  Study was marked in Epic for significant notification  Workstation performed: [id number]         CT abdomen pelvis with contrast   Final Result by Arianna Mobley MD (06/03 0449)      No CT evidence of acute abdominopelvic disease  Stable probable right adnexal dermoid cyst          Workstation performed: [id number]                    Procedures  Procedures         ED Course                                           MDM  Number of Diagnoses or Management Options  Nausea and vomiting: new and requires workup  Tonsillitis: new and requires workup  Diagnosis management comments: This is a 69-year-old female arriving to the emergency department with chief complaint of sore throat  Reports developing nausea with episodes of vomiting over the past couple of days  States that she has noticed blood-tinged emesis and small blood clots with vomiting  Reports subjective fevers, afebrile on arrival without recent antipyretic use  Differential diagnosis includes but not limited to: pharyngitis, tonsillitis, laryngitis, Barbara-Dunham tear, esophagitis, gastritis, pud, anemia dehydration, electrolyte derangement  Will pursue ct soft tissue neck to rule out pta, rpa, alicia's or deep-space infection given symptom duration  Will pursue ct abd/pelvis to rule out acute intra-abdominal process given onset of reported hematemesis  Strep, mono, covid testing from prior edv negative, no utility in repeating    Initial ED plan: labs, imaging, pain control    Final ED Assessment: Vital signs stable on hospital arrival, the patient afebrile and nontoxic in appearance  Examination as documented above  All labs and imaging independently reviewed with imaging interpreted by the radiologist   Labs notable for leukocytosis of 13 16, non-specific    CT soft tissue neck reports findings compatible with acute tonsillitis, no definite discrete collection to suggest abscess  CT abdomen/pelvis reports no CT evidence of acute abdominopelvic disease, stable probable right adnexal dermoid cyst   Patient updated of all lab and imaging results  Antibiotics provided in the emergency department today  Will discharge with scripts for amoxicillin and Magic mouthwash  ENT follow-up given with recommendation for follow-up upon completing treatment course  We discussed strict hospital return precautions  The patient had verbalized understanding and was agreeable with disposition plan  While being observed in the emergency department, the patient's symptoms were well controlled, there were no episodes of vomiting, and the patient tolerated oral intake  Her condition at time of discharge is stable  The patient had ambulated independently from the department without issue  Amount and/or Complexity of Data Reviewed  Clinical lab tests: reviewed and ordered  Tests in the radiology section of CPT®: ordered and reviewed  Review and summarize past medical records: yes  Independent visualization of images, tracings, or specimens: yes    Risk of Complications, Morbidity, and/or Mortality  Presenting problems: low  Diagnostic procedures: low  Management options: low    Patient Progress  Patient progress: stable      Disposition  Final diagnoses: Tonsillitis   Nausea and vomiting     Time reflects when diagnosis was documented in both MDM as applicable and the Disposition within this note     Time User Action Codes Description Comment    6/3/2021  5:22 AM Kailyn Lipscomb [J03 [id number]] Tonsillitis     6/3/2021  5:22 AM Kailyn Lipscomb [[id number]] Nausea and vomiting       ED Disposition     ED Disposition Condition Date/Time Comment    Discharge Stable Thu Franko 3, 2021  5:22 AM April Gonzalez discharge to home/self care              Follow-up Information     Follow up With Specialties Details Why Contact Info Additional Information    Guru Castillo, DO Family Medicine In 1 week [address] Stone MD Allergy   [address] [facility] [id number] [address] [facility] Emergency Department Emergency Medicine  If symptoms worsen [address] [facility] [id number] [facility] Emergency Department, [address] [facility], [facility], [address] [place] [address], [id number]          Discharge Medication List as of 6/3/2021  5:50 AM      START taking these medications    Details   al mag oxide-diphenhydramine-lidocaine viscous (MAGIC MOUTHWASH) 1:1:1 suspension Swish and spit 10 mL every 6 (six) hours as needed for mouth pain or discomfort, Starting Thu 6/3/2021, Normal      amoxicillin (AMOXIL) 500 mg capsule Take 1 capsule (500 mg total) by mouth every 12 (twelve) hours for 10 days, Starting Thu 6/3/2021, Until Sun 6/13/2021, Normal         CONTINUE these medications which have NOT CHANGED    Details   ARIPiprazole (ABILIFY) 5 mg tablet Take 1/2 tab po qhs x 1 week, then increase to 1 full tab po qhs, Normal      Ferrous Sulfate (IRON) 325 (65 Fe) MG TABS Take 1 tablet by mouth daily , Historical Med      Loratadine (CLARITIN PO) Take by mouth daily, Historical Med      nystatin (MYCOSTATIN) 500,000 units/5 mL suspension Apply 5 mL (500,000 Units total) to the mouth or throat 4 (four) times a day for 7 days, Starting Wed 5/26/2021, Until Thu 6/3/2021, Normal      ondansetron (ZOFRAN) 4 mg tablet Take 1 tablet (4 mg total) by mouth every 8 (eight) hours as needed for nausea or vomiting, Starting Wed 5/26/2021, Normal      medroxyPROGESTERone (DEPO-PROVERA) 150 mg/mL injection Inject 1 mL (150 mg total) into a muscle every 3 (three) months, Starting Tue 7/7/2020, Normal           No discharge procedures on file      PDMP Review       Value Time User    PDMP Reviewed  Yes 11/29/2019 10:26 PM Hannah Sotomayor MD          ED Provider  Electronically Signed by Javed Sheridan PA-C  06/10/21 1257

## 2021-06-03 NOTE — Clinical Note
Bainbridge Evelyn was seen and treated in our emergency department on 6/3/2021  Diagnosis: Tonsillitis    Susana    She may return on this date: 06/09/2021         If you have any questions or concerns, please don't hesitate to call        Melina Hawkins PA-C    ______________________________           _______________          _______________  Hospital Representative                              Date                                Time

## 2021-06-04 ENCOUNTER — APPOINTMENT (EMERGENCY)
Dept: RADIOLOGY | Facility: HOSPITAL | Age: 21
End: 2021-06-04
Payer: COMMERCIAL

## 2021-06-04 ENCOUNTER — HOSPITAL ENCOUNTER (EMERGENCY)
Facility: HOSPITAL | Age: 21
Discharge: HOME | End: 2021-06-04
Attending: SURGERY | Admitting: SURGERY
Payer: COMMERCIAL

## 2021-06-04 VITALS
HEART RATE: 84 BPM | OXYGEN SATURATION: 98 % | RESPIRATION RATE: 18 BRPM | TEMPERATURE: 98.9 F | SYSTOLIC BLOOD PRESSURE: 112 MMHG | DIASTOLIC BLOOD PRESSURE: 62 MMHG | WEIGHT: 249.12 LBS

## 2021-06-04 DIAGNOSIS — S01.81XA LACERATION OF CHIN, INITIAL ENCOUNTER: Primary | ICD-10-CM

## 2021-06-04 DIAGNOSIS — R55 SYNCOPE AND COLLAPSE: ICD-10-CM

## 2021-06-04 LAB
BASE EXCESS BLDA CALC-SCNC: 1 MMOL/L (ref -2–3)
GLUCOSE SERPL-MCNC: 123 MG/DL (ref 65–140)
HCO3 BLDA-SCNC: 24.1 MMOL/L (ref 24–30)
HCT VFR BLD CALC: 42 % (ref 34.8–46.1)
HGB BLDA-MCNC: 14.3 G/DL (ref 11.5–15.4)
PCO2 BLD: 25 MMOL/L (ref 21–32)
PCO2 BLD: 34.3 MM HG (ref 42–50)
PH BLD: 7.46 [PH] (ref 7.3–7.4)
PO2 BLD: 49 MM HG (ref 35–45)
POTASSIUM BLD-SCNC: 3.1 MMOL/L (ref 3.5–5.3)
SAO2 % BLD FROM PO2: 86 % (ref 60–85)
SODIUM BLD-SCNC: 142 MMOL/L (ref 136–145)
SPECIMEN SOURCE: ABNORMAL

## 2021-06-04 PROCEDURE — NC001 PR NO CHARGE: Performed by: SURGERY

## 2021-06-04 PROCEDURE — 82803 BLOOD GASES ANY COMBINATION: CPT

## 2021-06-04 PROCEDURE — NC001 PR NO CHARGE: Performed by: EMERGENCY MEDICINE

## 2021-06-04 PROCEDURE — 84295 ASSAY OF SERUM SODIUM: CPT

## 2021-06-04 PROCEDURE — 84132 ASSAY OF SERUM POTASSIUM: CPT

## 2021-06-04 PROCEDURE — 93308 TTE F-UP OR LMTD: CPT | Performed by: SURGERY

## 2021-06-04 PROCEDURE — 76705 ECHO EXAM OF ABDOMEN: CPT | Performed by: SURGERY

## 2021-06-04 PROCEDURE — 99284 EMERGENCY DEPT VISIT MOD MDM: CPT | Performed by: SURGERY

## 2021-06-04 PROCEDURE — 99284 EMERGENCY DEPT VISIT MOD MDM: CPT

## 2021-06-04 PROCEDURE — 96365 THER/PROPH/DIAG IV INF INIT: CPT

## 2021-06-04 PROCEDURE — 90471 IMMUNIZATION ADMIN: CPT

## 2021-06-04 PROCEDURE — 90715 TDAP VACCINE 7 YRS/> IM: CPT | Performed by: SURGERY

## 2021-06-04 PROCEDURE — 93005 ELECTROCARDIOGRAM TRACING: CPT

## 2021-06-04 PROCEDURE — 82947 ASSAY GLUCOSE BLOOD QUANT: CPT

## 2021-06-04 PROCEDURE — 12013 RPR F/E/E/N/L/M 2.6-5.0 CM: CPT | Performed by: SURGERY

## 2021-06-04 PROCEDURE — 85014 HEMATOCRIT: CPT

## 2021-06-04 RX ORDER — AMOXICILLIN 250 MG/1
CAPSULE ORAL EVERY 8 HOURS SCHEDULED
COMMUNITY
End: 2021-07-23

## 2021-06-04 RX ORDER — LIDOCAINE HYDROCHLORIDE 10 MG/ML
INJECTION, SOLUTION EPIDURAL; INFILTRATION; INTRACAUDAL; PERINEURAL CODE/TRAUMA/SEDATION MEDICATION
Status: COMPLETED | OUTPATIENT
Start: 2021-06-04 | End: 2021-06-04

## 2021-06-04 RX ORDER — SODIUM CHLORIDE, SODIUM GLUCONATE, SODIUM ACETATE, POTASSIUM CHLORIDE, MAGNESIUM CHLORIDE, SODIUM PHOSPHATE, DIBASIC, AND POTASSIUM PHOSPHATE .53; .5; .37; .037; .03; .012; .00082 G/100ML; G/100ML; G/100ML; G/100ML; G/100ML; G/100ML; G/100ML
1000 INJECTION, SOLUTION INTRAVENOUS ONCE
Status: COMPLETED | OUTPATIENT
Start: 2021-06-04 | End: 2021-06-04

## 2021-06-04 RX ADMIN — LIDOCAINE HYDROCHLORIDE 10 ML: 10 INJECTION, SOLUTION EPIDURAL; INFILTRATION; INTRACAUDAL at 20:11

## 2021-06-04 RX ADMIN — SODIUM CHLORIDE, SODIUM GLUCONATE, SODIUM ACETATE, POTASSIUM CHLORIDE, MAGNESIUM CHLORIDE, SODIUM PHOSPHATE, DIBASIC, AND POTASSIUM PHOSPHATE 1000 ML: .53; .5; .37; .037; .03; .012; .00082 INJECTION, SOLUTION INTRAVENOUS at 21:04

## 2021-06-04 RX ADMIN — TETANUS TOXOID, REDUCED DIPHTHERIA TOXOID AND ACELLULAR PERTUSSIS VACCINE, ADSORBED 0.5 ML: 5; 2.5; 8; 8; 2.5 SUSPENSION INTRAMUSCULAR at 21:05

## 2021-06-04 NOTE — LETTER
[address] [place] [address]  Dept: [phone]    June 4, 2021     Patient: Jc Benavidez   YOB: 2000   Date of Visit: 6/4/2021       To Whom it May Concern:    Jc Benavidez is under my professional care  She was seen in the hospital from 6/4/2021   to 06/04/21  She is required to be seen for a follow up appointment in 1 week  Please excuse her from work 6/11/2021  If you have any questions or concerns, please don't hesitate to call           Sincerely,          Emily Mcnally PA-C

## 2021-06-05 LAB
ATRIAL RATE: 75 BPM
P AXIS: 55 DEGREES
PR INTERVAL: 134 MS
QRS AXIS: 46 DEGREES
QRSD INTERVAL: 96 MS
QT INTERVAL: 376 MS
QTC INTERVAL: 419 MS
T WAVE AXIS: 17 DEGREES
VENTRICULAR RATE: 75 BPM

## 2021-06-05 PROCEDURE — 93010 ELECTROCARDIOGRAM REPORT: CPT | Performed by: INTERNAL MEDICINE

## 2021-06-05 NOTE — PROCEDURES
POC FAST US    Date/Time: 6/4/2021 9:21 PM  Performed by: Shankar Bustillo PA-C  Authorized by: Shankar Bustillo PA-C     Patient location:  ED and Trauma  Procedure details:     Exam Type:  Diagnostic    Indications: blunt abdominal trauma      Assess for:  Intra-abdominal fluid and pericardial effusion    Technique: FAST      Views obtained:  Heart - Pericardial sac, RUQ - Hebert's Pouch, LUQ - Splenorenal space and Suprapubic - Pouch of Peña    Image quality: diagnostic      Image availability:  Images available in PACS  FAST Findings:     RUQ (Hepatorenal) free fluid: absent      LUQ (Splenorenal) free fluid: absent      Suprapubic free fluid: absent      Cardiac wall motion: identified      Pericardial effusion: absent    Interpretation:     Impressions: negative    Comments:      Images in PACS

## 2021-06-05 NOTE — H&P
[facility]  H&P- Godfrey Andres 2000, 24 y o  female MRN: [id number]  Unit/Bed#: ED 25 Encounter: [id number]  Primary Care Provider: Chacorta Basilio DO   Date and time admitted to hospital: 6/4/2021  7:47 PM    Syncope and collapse  Assessment & Plan  - patient reports syncopal episode in the shower today after smoking Medicinal marijuana  - Headstrike, loss of consciousness  No anticoagulation/ antiplatelet medications  - patient reports she has not had much to eat or drink in the past few days because of tonsillitis, which she is currently on amoxicillin for  - injuries as listed  - vital stable  - IV fluid bolus  - if patient can tolerate diet, ambulate independently she can be discharged from the emergency department    * Laceration of chin, initial encounter  Assessment & Plan  - Chin laceration in shower, noncontaminated, no active bleeding  - Simple closure with sutures  - Tdap updated today  - Follow up in 1 week for suture removal    Cervical Collar Clearance: On exam, the patient had no midline point tenderness or paresthesias/numbness/weakness in the extremities  The patient had full range of motion (was then able to flex, extend, and rotate head laterally) without pain  The patient's cervical spine was cleared radiologically and clinically  Cervical collar removed at this time  Jaquan Villalpando PA-C  6/4/2021 8:57 PM       H&P Exam - Trauma   Godfrey Andres 24 y o  female MRN: [id number]  Unit/Bed#: ED 25 Encounter: [id number]    Assessment/Plan   Trauma Alert: Level B  Model of Arrival: Ambulance  Trauma Team: Attending Andie Awad and CELENA [address] [place] [address]  Consultants: None    Chief Complaint: Chin pain    History of Present Illness   HPI:  Godfrey Andres is a 24 y o  female who presents with shin pain and laceration after syncopal episode    Patient reports she was in the shower after smoking medicinal marijuana when she suddenly became dizzy and passed out, hitting her head and chin on the shower  She had loss of consciousness, reports no anticoagulation or anti-platelet medications  She reports that she woke up and called 911  Patient reports that she has been on amoxicillin recently for tonsillitis, which has also been causing her lack of appetite  Patient has not much to eat or drink over the past week due to pain in her throat  Patient is reporting continued dizziness  Mechanism:Fall    Review of Systems   Constitutional: Positive for activity change and appetite change  Negative for chills and fever  HENT: Positive for sore throat  Eyes: Negative  Respiratory: Negative  Negative for shortness of breath  Cardiovascular: Negative  Negative for chest pain  Gastrointestinal: Negative  Genitourinary: Negative  Musculoskeletal: Negative  Negative for neck pain and neck stiffness  Skin: Positive for wound  Neurological: Positive for dizziness, syncope and light-headedness  Negative for tremors, weakness, numbness and headaches  Psychiatric/Behavioral: Negative  All other systems reviewed and are negative  12-point, complete review of systems was reviewed and negative except as stated above  Historical Information   History is unobtainable from the patient due to none  Efforts to obtain history included the following sources: family member, obtained from other records    No past medical history on file  No past surgical history on file  Social History   Social History     Substance and Sexual Activity   Alcohol Use Not on file     Social History     Substance and Sexual Activity   Drug Use Not on file     Social History     Tobacco Use   Smoking Status Not on file     No existing history information found  No existing history information found  There is no immunization history for the selected administration types on file for this patient    Last Tetanus: unknown, update today  Family History: Non-contributory  Unable to obtain/limited by none      Meds/Allergies   all current active meds have been reviewed    No Known Allergies      PHYSICAL EXAM    PE limited by: none    Objective   Vitals:   First set: Temperature: 98 9 °F (37 2 °C) (06/04/21 1959)  Pulse: 92 (06/04/21 2000)  Respirations: 18 (06/04/21 1959)  Blood Pressure: 133/84 (06/04/21 1959)    Primary Survey:   (A) Airway:  Intact  (B) Breathing:  Breath sounds clear and equal bilaterally  (C) Circulation: Pulses:   pedal  2/4 and radial  2/4  (D) Disabliity:  GCS Total:  15  (E) Expose:  Completed    Secondary Survey: (Click on Physical Exam tab above)  Physical Exam  Vitals signs reviewed  Constitutional:       General: She is not in acute distress  Appearance: Normal appearance  She is not ill-appearing, toxic-appearing or diaphoretic  HENT:      Head: Normocephalic  Comments: Abrasions to forehead, laceration to chin     Nose: Nose normal       Mouth/Throat:      Mouth: Mucous membranes are moist       Pharynx: Oropharynx is clear  Eyes:      Extraocular Movements: Extraocular movements intact  Conjunctiva/sclera: Conjunctivae normal       Pupils: Pupils are equal, round, and reactive to light  Neck:      Musculoskeletal: Normal range of motion and neck supple  No neck rigidity or muscular tenderness  Cardiovascular:      Rate and Rhythm: Normal rate and regular rhythm  Pulses: Normal pulses  Heart sounds: Normal heart sounds  No murmur  No friction rub  No gallop  Pulmonary:      Effort: Pulmonary effort is normal  No respiratory distress  Breath sounds: Normal breath sounds  Chest:      Chest wall: No tenderness  Abdominal:      General: Abdomen is flat  Bowel sounds are normal  There is no distension  Palpations: Abdomen is soft  Tenderness: There is no abdominal tenderness  There is no guarding  Musculoskeletal: Normal range of motion  General: No swelling, tenderness or signs of injury     Skin: General: Skin is warm and dry  Capillary Refill: Capillary refill takes less than 2 seconds  Findings: Lesion present  Comments: 3 cm laceration to chin   Neurological:      General: No focal deficit present  Mental Status: She is alert and oriented to person, place, and time  Psychiatric:         Mood and Affect: Mood normal          Invasive Devices     Peripheral Intravenous Line            Peripheral IV 06/04/21 Left Antecubital less than 1 day                Lab Results: Results: I have personally reviewed pertinent reports  Imaging/EKG Studies: Results: I have personally reviewed pertinent reports    , FAST: Negative  Other Studies:   No orders to display         Code Status: No Order  Advance Directive and Living Will:      Power of :    POLST:

## 2021-06-05 NOTE — DISCHARGE INSTRUCTIONS
Laceration   WHAT YOU NEED TO KNOW:   A laceration is an injury to the skin and the soft tissue underneath it  Lacerations can happen anywhere on the body  DISCHARGE INSTRUCTIONS:   Seek care immediately if:   · You have heavy bleeding or bleeding that does not stop after 10 minutes of holding firm, direct pressure over the wound  · Your wound opens up  Call your doctor if:   · You have a fever or chills  · Your laceration is red, warm, or swollen  · You have red streaks on your skin coming from your wound  · You have white or yellow drainage from the wound that smells bad  · You have pain that gets worse, even after treatment  · You have questions or concerns about your condition or care  Medicines: You may need any of the following:  · Prescription pain medicine  may be given  Ask your healthcare provider how to take this medicine safely  Some prescription pain medicines contain acetaminophen  Do not take other medicines that contain acetaminophen without talking to your healthcare provider  Too much acetaminophen may cause liver damage  Prescription pain medicine may cause constipation  Ask your healthcare provider how to prevent or treat constipation  · Antibiotics  help treat or prevent a bacterial infection  · Take your medicine as directed  Contact your healthcare provider if you think your medicine is not helping or if you have side effects  Tell him or her if you are allergic to any medicine  Keep a list of the medicines, vitamins, and herbs you take  Include the amounts, and when and why you take them  Bring the list or the pill bottles to follow-up visits  Carry your medicine list with you in case of an emergency  Care for your wound as directed:   · Do not get your wound wet  until your healthcare provider says it is okay  Do not soak your wound in water  Do not go swimming until your healthcare provider says it is okay  Carefully wash the wound with soap and water  Gently pat the area dry or allow it to air dry  · Change your bandages  when they get wet, dirty, or after washing  Apply new, clean bandages as directed  Do not apply elastic bandages or tape too tight  Do not put powders or lotions over your incision  · Apply antibiotic ointment as directed  Your healthcare provider may give you antibiotic ointment to put over your wound if you have stitches  If you have strips of tape over your incision, let them dry up and fall off on their own  If they do not fall off within 14 days, gently remove them  If you have glue over your wound, do not remove or pick at it  If your glue comes off, do not replace it with glue that you have at home  · Check your wound every day for signs of infection, such as swelling, redness, or pus  Self-care:   · Apply ice  on your wound for 15 to 20 minutes every hour or as directed  Use an ice pack, or put crushed ice in a plastic bag  Cover it with a towel  Ice helps prevent tissue damage and decreases swelling and pain  · Use a splint as directed  A splint will decrease movement and stress on your wound  It may help it heal faster  A splint may be used for lacerations over joints or areas of your body that bend  Ask your healthcare provider how to apply and remove a splint  · Decrease scarring of your wound  by applying ointments as directed  Do not apply ointments until your healthcare provider says it is okay  You may need to wait until your wound is healed  Ask which ointment to buy and how often to use it  After your wound is healed, use sunscreen over the area when you are out in the sun  You should do this for at least 6 months to 1 year after your injury  Follow up with your doctor as directed: You will need to return in 3 to 14 days to have stitches or staples removed  Write down your questions so you remember to ask them during your visits    © Copyright [employer] 2020 Information is for End User's use only and may not be sold, redistributed or otherwise used for commercial purposes  All illustrations and images included in CareNotes® are the copyrighted property of A [employer] Inc  or Marco Antonio Sutherland  The above information is an  only  It is not intended as medical advice for individual conditions or treatments  Talk to your doctor, nurse or pharmacist before following any medical regimen to see if it is safe and effective for you

## 2021-06-05 NOTE — PROCEDURES
Laceration repair    Date/Time: 6/4/2021 9:22 PM  Performed by: Yazmin Zarate PA-C  Authorized by: Yazmin Zarate PA-C   Consent: Verbal consent obtained    Risks and benefits: risks, benefits and alternatives were discussed  Consent given by: patient  Patient understanding: patient states understanding of the procedure being performed  Patient identity confirmed: verbally with patient  Body area: head/neck  Location details: chin  Laceration length: 3 cm  Foreign bodies: no foreign bodies  Anesthesia: local infiltration    Anesthesia:  Local Anesthetic: lidocaine 1% with epinephrine  Anesthetic total: 5 mL    Sedation:  Patient sedated: no      Wound Dehiscence:  Superficial Wound Dehiscence: simple closure      Procedure Details:  Irrigation solution: saline  Irrigation method: syringe  Amount of cleaning: standard  Skin closure: 4-0 Prolene  Number of sutures: 5  Technique: simple  Approximation: close  Approximation difficulty: simple  Patient tolerance: patient tolerated the procedure well with no immediate complications

## 2021-06-05 NOTE — ASSESSMENT & PLAN NOTE
- patient reports syncopal episode in the shower today after smoking Medicinal marijuana  - Headstrike, loss of consciousness   No anticoagulation/ antiplatelet medications  - patient reports she has not had much to eat or drink in the past few days because of tonsillitis, which she is currently on amoxicillin for  - injuries as listed  - vital stable  - IV fluid bolus  - if patient can tolerate diet, ambulate independently she can be discharged from the emergency department

## 2021-06-05 NOTE — ASSESSMENT & PLAN NOTE
- Chin laceration in shower, noncontaminated, no active bleeding  - Simple closure with sutures  - Tdap updated today  - Follow up in 1 week for suture removal

## 2021-06-05 NOTE — ED PROVIDER NOTES
Emergency Department Airway Evaluation and Management Form    History  Obtained from:  Patient  Patient has no known allergies  Chief Complaint   Patient presents with    Fall     PT HAD A SYNCOPAL EPISODE AND FELL  HPI    No past medical history on file  No past surgical history on file  No family history on file  Social History     Tobacco Use    Smoking status: Not on file   Substance Use Topics    Alcohol use: Not on file    Drug use: Not on file     I have reviewed and agree with the history as documented  Review of Systems    Physical Exam  /75   Pulse 90   Temp 98 9 °F (37 2 °C) (Tympanic)   Resp 18   SpO2 98%     Physical Exam    ED Medications  Medications   lidocaine (PF) (XYLOCAINE-MPF) 1 % injection (10 mL Infiltration Given 6/4/21 2011)       Intubation  Procedures    Notes  Patient had a hit syncopal event where she struck the underside of her chin resulting in a laceration as well as her forehead  Patient does not know how long she was unconscious for  Patient states the syncope event happened in the shower after she smoke medical marijuana  On arrival to the emergency department her airway is intact his she has equal bilateral breath sounds    The rest of the trauma examination was done by the trauma team please see their note for further evaluation treatment    Final Diagnosis  Final diagnoses:   None       ED Provider  Electronically Signed by     Augustus Terrell DO  06/04/21 2022

## 2021-06-06 LAB — BACTERIA THROAT CULT: NORMAL

## 2021-06-07 DIAGNOSIS — Z30.42 SURVEILLANCE FOR DEPO-PROVERA CONTRACEPTION: ICD-10-CM

## 2021-06-07 RX ORDER — MEDROXYPROGESTERONE ACETATE 150 MG/ML
150 INJECTION, SUSPENSION INTRAMUSCULAR
Qty: 1 ML | Refills: 1 | Status: SHIPPED | OUTPATIENT
Start: 2021-06-07 | End: 2021-09-03

## 2021-06-08 ENCOUNTER — CLINICAL SUPPORT (OUTPATIENT)
Dept: OBGYN CLINIC | Facility: MEDICAL CENTER | Age: 21
End: 2021-06-08
Payer: COMMERCIAL

## 2021-06-08 VITALS — SYSTOLIC BLOOD PRESSURE: 130 MMHG | BODY MASS INDEX: 32.4 KG/M2 | DIASTOLIC BLOOD PRESSURE: 70 MMHG | WEIGHT: 219.4 LBS

## 2021-06-08 DIAGNOSIS — Z30.42 SURVEILLANCE FOR DEPO-PROVERA CONTRACEPTION: Primary | ICD-10-CM

## 2021-06-08 PROCEDURE — 96372 THER/PROPH/DIAG INJ SC/IM: CPT | Performed by: NURSE PRACTITIONER

## 2021-06-08 RX ORDER — MEDROXYPROGESTERONE ACETATE 150 MG/ML
150 INJECTION, SUSPENSION INTRAMUSCULAR ONCE
Status: COMPLETED | OUTPATIENT
Start: 2021-06-08 | End: 2021-06-08

## 2021-06-08 RX ADMIN — MEDROXYPROGESTERONE ACETATE 150 MG: 150 INJECTION, SUSPENSION INTRAMUSCULAR at 13:25

## 2021-06-08 NOTE — PROGRESS NOTES
Pt is here for Depo Provera injection  Her last dose was 3/19/21  Her annual exam was on 7/7/2020  Urine pregnancy test done: N   Result: N/A  Depo given in R Buttock  Tolerated well  Lot [id number] Exp 2/2024  Next dose due August 24 - Sept 7, 2021

## 2021-06-11 ENCOUNTER — OFFICE VISIT (OUTPATIENT)
Dept: URGENT CARE | Facility: MEDICAL CENTER | Age: 21
End: 2021-06-11
Payer: COMMERCIAL

## 2021-06-11 VITALS
TEMPERATURE: 97.8 F | SYSTOLIC BLOOD PRESSURE: 127 MMHG | DIASTOLIC BLOOD PRESSURE: 84 MMHG | HEART RATE: 91 BPM | RESPIRATION RATE: 18 BRPM

## 2021-06-11 DIAGNOSIS — Z48.02 ENCOUNTER FOR REMOVAL OF SUTURES: Primary | ICD-10-CM

## 2021-06-11 PROCEDURE — 99213 OFFICE O/P EST LOW 20 MIN: CPT | Performed by: PHYSICIAN ASSISTANT

## 2021-06-11 NOTE — PROGRESS NOTES
[facility]        NAME: Macie Crew is a 24 y o  female  : 2000    MRN: [id number]  DATE: 2021  TIME: 11:49 AM    Assessment and Plan   Encounter for removal of sutures [Z48 02]  1  Encounter for removal of sutures           Patient Instructions     Suture removal  Follow up with PCP in 3-5 days  Proceed to  ER if symptoms worsen  Chief Complaint     Chief Complaint   Patient presents with    Suture / Staple Removal     pt had 5 sutures applied to chin last friday  History of Present Illness       25 y/o female presents for suture removal  Patient had sutures placed 1 week ago  Denies any discharge, redness, pain      Review of Systems   Review of Systems   Constitutional: Negative  HENT: Negative  Eyes: Negative  Respiratory: Negative  Negative for cough, chest tightness, shortness of breath, wheezing and stridor  Cardiovascular: Negative  Negative for chest pain, palpitations and leg swelling  Skin: Positive for wound           Current Medications       Current Outpatient Medications:     al mag oxide-diphenhydramine-lidocaine viscous (MAGIC MOUTHWASH) 1:1:1 suspension, Swish and spit 10 mL every 6 (six) hours as needed for mouth pain or discomfort, Disp: 90 mL, Rfl: 0    amoxicillin (AMOXIL) 250 mg capsule, Take by mouth every 8 (eight) hours, Disp: , Rfl:     amoxicillin (AMOXIL) 500 mg capsule, Take 1 capsule (500 mg total) by mouth every 12 (twelve) hours for 10 days, Disp: 20 capsule, Rfl: 0    ARIPiprazole (ABILIFY) 5 mg tablet, Take 1/2 tab po qhs x 1 week, then increase to 1 full tab po qhs, Disp: 30 tablet, Rfl: 3    Ferrous Sulfate (IRON) 325 (65 Fe) MG TABS, Take 1 tablet by mouth daily , Disp: , Rfl:     Loratadine (CLARITIN PO), Take by mouth daily, Disp: , Rfl:     MAGIC MOUTHWASH - COMPOUNDING, , Disp: , Rfl:     medroxyPROGESTERone (DEPO-PROVERA) 150 mg/mL injection, Inject 1 mL (150 mg total) into a muscle every 3 (three) months, Disp: 1 mL, Rfl: 1    ondansetron (ZOFRAN) 4 mg tablet, Take 1 tablet (4 mg total) by mouth every 8 (eight) hours as needed for nausea or vomiting, Disp: 20 tablet, Rfl: 0    Current Facility-Administered Medications:     medroxyPROGESTERone (DEPO-PROVERA) IM injection 150 mg, 150 mg, Intramuscular, Q3 Months, Tara Deshpande PA-C, 150 mg at 10/01/20 1316    Current Allergies     Allergies as of 06/11/2021    (No Known Allergies)            The following portions of the patient's history were reviewed and updated as appropriate: allergies, current medications, past family history, past medical history, past social history, past surgical history and problem list      Past Medical History:   Diagnosis Date    Allergic     pollen; animal dander    Anemia     Anxiety     Asthma     Closed left ankle fracture     Depression     Dysmenorrhea     Influenza A 2/28/2020    Insomnia     Moderate single current episode of major depressive disorder ([facility] [address] ) 1/5/2017       Past Surgical History:   Procedure Laterality Date    ANKLE SURGERY Left 2014    two screws       Family History   Problem Relation Age of Onset    Bipolar disorder Mother         Fentanyl per Pt    Drug abuse Mother     Anxiety disorder Sister     Bipolar disorder Sister     Autism spectrum disorder Cousin     Leukemia Paternal Aunt     No Known Problems Father     Bipolar disorder Half-Sister          Medications have been verified  Objective   /84   Pulse 91   Temp 97 8 °F (36 6 °C)   Resp 18   LMP  (LMP Unknown)        Physical Exam     Physical Exam  Constitutional:       Appearance: She is well-developed  HENT:      Head: Normocephalic and atraumatic  Right Ear: External ear normal       Left Ear: External ear normal       Nose: Nose normal       Mouth/Throat:      Pharynx: No oropharyngeal exudate  Neck:      Musculoskeletal: Normal range of motion and neck supple     Cardiovascular:      Rate and Rhythm: Normal rate and regular rhythm  Heart sounds: Normal heart sounds  Pulmonary:      Effort: Pulmonary effort is normal  No respiratory distress  Breath sounds: Normal breath sounds  No wheezing or rales  Chest:      Chest wall: No tenderness  Lymphadenopathy:      Cervical: No cervical adenopathy

## 2021-07-14 ENCOUNTER — TELEPHONE (OUTPATIENT)
Dept: FAMILY MEDICINE CLINIC | Facility: MEDICAL CENTER | Age: 21
End: 2021-07-14

## 2021-07-14 ENCOUNTER — OFFICE VISIT (OUTPATIENT)
Dept: URGENT CARE | Facility: MEDICAL CENTER | Age: 21
End: 2021-07-14
Payer: COMMERCIAL

## 2021-07-14 VITALS
HEIGHT: 69 IN | SYSTOLIC BLOOD PRESSURE: 153 MMHG | RESPIRATION RATE: 18 BRPM | BODY MASS INDEX: 32.14 KG/M2 | OXYGEN SATURATION: 96 % | WEIGHT: 217 LBS | DIASTOLIC BLOOD PRESSURE: 72 MMHG | HEART RATE: 102 BPM | TEMPERATURE: 98.6 F

## 2021-07-14 DIAGNOSIS — B37.0 ORAL THRUSH: Primary | ICD-10-CM

## 2021-07-14 PROCEDURE — 99283 EMERGENCY DEPT VISIT LOW MDM: CPT | Performed by: PHYSICIAN ASSISTANT

## 2021-07-14 PROCEDURE — G0382 LEV 3 HOSP TYPE B ED VISIT: HCPCS | Performed by: PHYSICIAN ASSISTANT

## 2021-07-14 NOTE — TELEPHONE ENCOUNTER
Pt said she had thrush and was seen at [facility]  She got a mouthwash that worked but now that it is all gone, the thrush has come back  She declined an appt and is going to go to the  because of her work hours and she wants to get an abx  She wants to know if you want to see her even though she is going to urgent care or have her get b/w because she said oral thrush only occurs with immune issues  Please, advise

## 2021-07-14 NOTE — PROGRESS NOTES
[facility]        NAME: Apoorva Crisostomo is a 24 y o  female  : 2000    MRN: [id number]  DATE: 2021  TIME: 8:04 PM    Assessment and Plan   Oral thrush [B37 0]  1  Oral thrush  nystatin (MYCOSTATIN) 500,000 units/5 mL suspension         Patient Instructions     Nystatin as directed  Follow up with PCP in 3-5 days  Proceed to  ER if symptoms worsen  Chief Complaint     Chief Complaint   Patient presents with    other     coating on tounge x 4 days          History of Present Illness        Patient is a 52-year-old female who presents today with complaints of white coating on her tongue and soreness for the past 4 days  She has had thrush before  Recently finished amoxicillin  No other symptoms including fevers or upper respiratory symptoms  Review of Systems   Review of Systems   Constitutional: Negative for chills and fever  HENT: Negative for congestion and sore throat  Thrush   Respiratory: Negative for shortness of breath  Cardiovascular: Negative for chest pain           Current Medications       Current Outpatient Medications:     ARIPiprazole (ABILIFY) 5 mg tablet, Take 1/2 tab po qhs x 1 week, then increase to 1 full tab po qhs, Disp: 30 tablet, Rfl: 3    Ferrous Sulfate (IRON) 325 (65 Fe) MG TABS, Take 1 tablet by mouth daily , Disp: , Rfl:     ondansetron (ZOFRAN) 4 mg tablet, Take 1 tablet (4 mg total) by mouth every 8 (eight) hours as needed for nausea or vomiting, Disp: 20 tablet, Rfl: 0    al mag oxide-diphenhydramine-lidocaine viscous (MAGIC MOUTHWASH) 1:1:1 suspension, Swish and spit 10 mL every 6 (six) hours as needed for mouth pain or discomfort (Patient not taking: Reported on 2021), Disp: 90 mL, Rfl: 0    amoxicillin (AMOXIL) 250 mg capsule, Take by mouth every 8 (eight) hours (Patient not taking: Reported on 2021), Disp: , Rfl:     Loratadine (CLARITIN PO), Take by mouth daily (Patient not taking: Reported on 2021), Disp: , Rfl:     MAGIC MOUTHWASH - COMPOUNDING, , Disp: , Rfl:     medroxyPROGESTERone (DEPO-PROVERA) 150 mg/mL injection, Inject 1 mL (150 mg total) into a muscle every 3 (three) months (Patient not taking: Reported on 7/14/2021), Disp: 1 mL, Rfl: 1    nystatin (MYCOSTATIN) 500,000 units/5 mL suspension, Apply 5 mL (500,000 Units total) to the mouth or throat 4 (four) times a day, Disp: 250 mL, Rfl: 0    Current Facility-Administered Medications:     medroxyPROGESTERone (DEPO-PROVERA) IM injection 150 mg, 150 mg, Intramuscular, Q3 Months, Tara Deshpande PA-C, 150 mg at 10/01/20 1316    Current Allergies     Allergies as of 07/14/2021    (No Known Allergies)            The following portions of the patient's history were reviewed and updated as appropriate: allergies, current medications, past family history, past medical history, past social history, past surgical history and problem list      Past Medical History:   Diagnosis Date    Allergic     pollen; animal dander    Anemia     Anxiety     Asthma     Closed left ankle fracture     Depression     Dysmenorrhea     Influenza A 2/28/2020    Insomnia     [facility]       Past Surgical History:   Procedure Laterality Date    ANKLE SURGERY Left 2014    two screws       Family History   Problem Relation Age of Onset    Bipolar disorder Mother         Fentanyl per Pt    Drug abuse Mother     Anxiety disorder Sister     Bipolar disorder Sister     Autism spectrum disorder Cousin     Leukemia Paternal Aunt     No Known Problems Father     Bipolar disorder Half-Sister          Medications have been verified  Objective   /72   Pulse 102   Temp 98 6 °F (37 °C)   Resp 18   Ht 5' 9" (1 753 m)   Wt 98 4 kg (217 lb)   SpO2 96%   BMI 32 05 kg/m²        Physical Exam     Physical Exam  Constitutional:       General: She is not in acute distress  Appearance: Normal appearance  She is not ill-appearing  HENT:      Head: Normocephalic and atraumatic  Nose: Nose normal       Mouth/Throat:      Lips: Pink  Mouth: Mucous membranes are moist       Tongue: Lesions present  Pharynx: Oropharynx is clear  Uvula midline  No pharyngeal swelling, oropharyngeal exudate or posterior oropharyngeal erythema  Tonsils: No tonsillar exudate or tonsillar abscesses  0 on the right  0 on the left  Cardiovascular:      Rate and Rhythm: Normal rate and regular rhythm  Pulmonary:      Effort: Pulmonary effort is normal    Skin:     General: Skin is warm and dry  Neurological:      Mental Status: She is alert

## 2021-07-23 ENCOUNTER — OFFICE VISIT (OUTPATIENT)
Dept: FAMILY MEDICINE CLINIC | Facility: MEDICAL CENTER | Age: 21
End: 2021-07-23
Payer: COMMERCIAL

## 2021-07-23 VITALS
HEIGHT: 69 IN | OXYGEN SATURATION: 97 % | SYSTOLIC BLOOD PRESSURE: 138 MMHG | DIASTOLIC BLOOD PRESSURE: 78 MMHG | WEIGHT: 219 LBS | BODY MASS INDEX: 32.44 KG/M2 | HEART RATE: 94 BPM | TEMPERATURE: 99.6 F

## 2021-07-23 DIAGNOSIS — K14.8 ABNORMAL COLOR OF TONGUE: Primary | ICD-10-CM

## 2021-07-23 PROCEDURE — 99213 OFFICE O/P EST LOW 20 MIN: CPT | Performed by: FAMILY MEDICINE

## 2021-07-23 PROCEDURE — 3008F BODY MASS INDEX DOCD: CPT | Performed by: FAMILY MEDICINE

## 2021-07-23 NOTE — PROGRESS NOTES
Assessment/Plan:       Diagnoses and all orders for this visit:    Abnormal color of tongue    Urgent care records reviewed  I did also look at the picture on patient's phone of what her tongue looked like when she was seen at the urgent care  I informed her I believe she has hairy tongue  I do not believe she has thrush  No additional intervention at this time  Patient has not been to the dentist in a while  I recommended she make an appointment  Removal of tongue piercing recommended but patient refuses to do this  Follow-up in December as previously scheduled or sooner if needed  Subjective:      Patient ID: Jose Bro is a 22 y o  female  Patient presents for follow-up from the urgent care  She was recently diagnosed with thrush  She is upset as this has been recurring  She has a picture on her phone of her tongue look like when she went to the urgent care  She was given nystatin  Was told to follow-up with PCP  Has not been to the dentist in over a year  The following portions of the patient's history were reviewed and updated as appropriate: She  has a past medical history of Allergic, Anemia, Anxiety, Asthma, Closed left ankle fracture, Depression, Dysmenorrhea, Influenza A (2/28/2020), Insomnia, and Moderate single current episode of major depressive disorder ([facility] [address] ) (1/5/2017)    She   Patient Active Problem List    Diagnosis Date Noted    Syncope and collapse 06/04/2021    Laceration of chin, initial encounter 06/04/2021    Bipolar II disorder ([facility] [address] ) 03/25/2021    Chronic post-traumatic stress disorder (PTSD) 01/26/2021    Panic attacks 01/26/2021    Insomnia 01/26/2021    Encounter for surveillance of injectable contraceptive 07/07/2020    Hypokalemia 02/27/2020    Environmental and seasonal allergies 02/07/2020    Concussion with no loss of consciousness 12/09/2019    Encounter for gynecological examination (general) (routine) without abnormal findings 05/02/2019    Encounter for Depo-Provera contraception 10/25/2018    Generalized anxiety disorder 03/24/2017    Social anxiety disorder 01/05/2017    Dysmenorrhea 12/07/2016    Iron deficiency 12/06/2016    Contact dermatitis and other eczema, due to unspecified cause 09/16/2014     She  has a past surgical history that includes Ankle surgery (Left, 2014)  Her family history includes Anxiety disorder in her sister; Autism spectrum disorder in her cousin; Bipolar disorder in her half-sister, mother, and sister; Drug abuse in her mother; Leukemia in her paternal aunt; No Known Problems in her father  She  reports that she has been smoking cigarettes  She has a 3 50 pack-year smoking history  She has never used smokeless tobacco  She reports previous alcohol use  She reports current drug use  Drug: Marijuana    Current Outpatient Medications   Medication Sig Dispense Refill    ARIPiprazole (ABILIFY) 5 mg tablet Take 1/2 tab po qhs x 1 week, then increase to 1 full tab po qhs 30 tablet 3    Ferrous Sulfate (IRON) 325 (65 Fe) MG TABS Take 1 tablet by mouth daily       Loratadine (CLARITIN PO) Take by mouth daily       medroxyPROGESTERone (DEPO-PROVERA) 150 mg/mL injection Inject 1 mL (150 mg total) into a muscle every 3 (three) months 1 mL 1    nystatin (MYCOSTATIN) 500,000 units/5 mL suspension Apply 5 mL (500,000 Units total) to the mouth or throat 4 (four) times a day 250 mL 0    ondansetron (ZOFRAN) 4 mg tablet Take 1 tablet (4 mg total) by mouth every 8 (eight) hours as needed for nausea or vomiting 20 tablet 0     Current Facility-Administered Medications   Medication Dose Route Frequency Provider Last Rate Last Admin    medroxyPROGESTERone (DEPO-PROVERA) IM injection 150 mg  150 mg Intramuscular Q3 Months Tara Deshpande PA-C   150 mg at 10/01/20 1316     Current Outpatient Medications on File Prior to Visit   Medication Sig    ARIPiprazole (ABILIFY) 5 mg tablet Take 1/2 tab po qhs x 1 week, then increase to 1 full tab po qhs    Ferrous Sulfate (IRON) 325 (65 Fe) MG TABS Take 1 tablet by mouth daily     Loratadine (CLARITIN PO) Take by mouth daily     medroxyPROGESTERone (DEPO-PROVERA) 150 mg/mL injection Inject 1 mL (150 mg total) into a muscle every 3 (three) months    nystatin (MYCOSTATIN) 500,000 units/5 mL suspension Apply 5 mL (500,000 Units total) to the mouth or throat 4 (four) times a day    ondansetron (ZOFRAN) 4 mg tablet Take 1 tablet (4 mg total) by mouth every 8 (eight) hours as needed for nausea or vomiting    [DISCONTINUED] al mag oxide-diphenhydramine-lidocaine viscous (MAGIC MOUTHWASH) 1:1:1 suspension Swish and spit 10 mL every 6 (six) hours as needed for mouth pain or discomfort (Patient not taking: Reported on 7/14/2021)    [DISCONTINUED] amoxicillin (AMOXIL) 250 mg capsule Take by mouth every 8 (eight) hours (Patient not taking: Reported on 7/14/2021)    [DISCONTINUED] MAGIC MOUTHWASH - COMPOUNDING  (Patient not taking: Reported on 7/23/2021)     Current Facility-Administered Medications on File Prior to Visit   Medication    medroxyPROGESTERone (DEPO-PROVERA) IM injection 150 mg     She has No Known Allergies       Review of Systems   Constitutional: Negative for fever  Respiratory: Negative for shortness of breath  Cardiovascular: Negative for chest pain  Objective:      /78 (BP Location: Left arm, Patient Position: Sitting, Cuff Size: Large)   Pulse 94   Temp 99 6 °F (37 6 °C)   Ht 5' 9" (1 753 m)   Wt 99 3 kg (219 lb)   SpO2 97%   BMI 32 34 kg/m²          Physical Exam  Constitutional:       General: She is not in acute distress  Appearance: She is not ill-appearing  HENT:      Mouth/Throat:     Pulmonary:      Effort: No respiratory distress

## 2021-07-26 ENCOUNTER — TELEPHONE (OUTPATIENT)
Dept: FAMILY MEDICINE CLINIC | Facility: MEDICAL CENTER | Age: 21
End: 2021-07-26

## 2021-07-26 NOTE — TELEPHONE ENCOUNTER
Please call patient  I spoke with Lorrie Mcgee from the urgent care and she tells me patient did in fact have a white coating on her tongue during her last urgent care visit  At her visit with me her tongue appeared brown as did her tongue in the picture she showed me on her phone  If white coating on the tongue returns have patient call the office so I can see her and visualize directly

## 2021-08-04 ENCOUNTER — TELEPHONE (OUTPATIENT)
Dept: PSYCHIATRY | Facility: CLINIC | Age: 21
End: 2021-08-04

## 2021-08-04 NOTE — TELEPHONE ENCOUNTER
1st attempt (encounter routed to writer):  Left message for patient and/or Parent/Guardian to call office back at [phone] to schedule medication management appointment with Christopher Anders PA-C      Reason:   Schedule follow up    Last completed appointment with provider:   5/28/2021

## 2021-08-25 ENCOUNTER — TELEPHONE (OUTPATIENT)
Dept: PSYCHIATRY | Facility: CLINIC | Age: 21
End: 2021-08-25

## 2021-08-25 NOTE — TELEPHONE ENCOUNTER
2nd attempt to contact patient:  Left message for patient and/or Parent/Guardian to call office back at [phone] to schedule medication management appointment with [facility] PAMELLA MADISON      Reason:   Schedule f/u    Last completed appointment with provider:   5/28/2021

## 2021-09-02 NOTE — PROGRESS NOTES
Patient's Lab: [facility]    Last Yearly: 5/2/19  Last Pap: Beginning @ 22  BC: Depo  LMP: Just spotting on depo  S/P HPV Series: Completed  S/P Covid Shot: Completed  Sexually Active: No          Depo    Last Depo: 6/8/21  Last Yearly: Today  Given Within Timeframe?: Yes 8/24-9/7/21  Serum HCG Indicated: No  LMP/Spotting/Bleeding on Depo: Just spotting  Injection Site Today: Lt  Buttock  How did patient tolerate: Well  1st Depo?  Wait time 10-15mins : No, NA  Literature Given From Inside Box: Yes  Next Depo Time Frame: 11/19-12/3/21      Given By: Dharmesh Fischer MA  Lot#: [id number]  EXP: 02/2024  NDC: [address]-9  Manufacture: Yolanda Stallworth

## 2021-09-03 ENCOUNTER — ANNUAL EXAM (OUTPATIENT)
Dept: OBGYN CLINIC | Facility: MEDICAL CENTER | Age: 21
End: 2021-09-03
Payer: COMMERCIAL

## 2021-09-03 VITALS
BODY MASS INDEX: 32.35 KG/M2 | DIASTOLIC BLOOD PRESSURE: 78 MMHG | HEIGHT: 69 IN | WEIGHT: 218.4 LBS | SYSTOLIC BLOOD PRESSURE: 130 MMHG

## 2021-09-03 DIAGNOSIS — Z01.419 ENCNTR FOR GYN EXAM (GENERAL) (ROUTINE) W/O ABN FINDINGS: Primary | ICD-10-CM

## 2021-09-03 DIAGNOSIS — Z30.42 SURVEILLANCE FOR DEPO-PROVERA CONTRACEPTION: ICD-10-CM

## 2021-09-03 PROCEDURE — 0503F POSTPARTUM CARE VISIT: CPT | Performed by: NURSE PRACTITIONER

## 2021-09-03 PROCEDURE — 96372 THER/PROPH/DIAG INJ SC/IM: CPT

## 2021-09-03 PROCEDURE — 99395 PREV VISIT EST AGE 18-39: CPT | Performed by: NURSE PRACTITIONER

## 2021-09-03 RX ORDER — MEDROXYPROGESTERONE ACETATE 150 MG/ML
150 INJECTION, SUSPENSION INTRAMUSCULAR
Qty: 1 ML | Refills: 3 | Status: SHIPPED | OUTPATIENT
Start: 2021-09-03 | End: 2022-05-05 | Stop reason: SDUPTHER

## 2021-09-03 RX ORDER — MEDROXYPROGESTERONE ACETATE 150 MG/ML
150 INJECTION, SUSPENSION INTRAMUSCULAR ONCE
Status: COMPLETED | OUTPATIENT
Start: 2021-09-03 | End: 2021-09-03

## 2021-09-03 RX ADMIN — MEDROXYPROGESTERONE ACETATE 150 MG: 150 INJECTION, SUSPENSION INTRAMUSCULAR at 15:24

## 2021-09-03 RX ADMIN — MEDROXYPROGESTERONE ACETATE 150 MG: 150 INJECTION, SUSPENSION INTRAMUSCULAR at 15:18

## 2021-09-03 NOTE — PROGRESS NOTES
Assessment/Plan:    Pap smear to start at age 25 as per ASCCP guidelines  GC/CT cultures annually once sexually active  Declined today  Encouraged to return to office for GC/CT testing  Always condom use when sexually active  Return to office every 11-13 weeks for depo injection  Calcium 1000 mg plus Vit D 600 IU daily  Daily weight bearing exercises  Keep in mind DMPA may delay fertility up to one year after discontinuation  Condom use to protect against sexually transmitted infections  Use seat belt in every car ride, avoid smoking and alcohol use  Obtain appropriate nutrition and hydration  Follow up with PCP for appropriate vaccine schedule  HPV vaccine series has been completed  Calcium 1300 mg per day to age 25  Age 24-51 calcium 1000mg daily intake  Vit D daily recommended  Monthly breast self exam to start at age 24  Return to office in one year or sooner, if needed  Diagnoses and all orders for this visit:    Encntr for gyn exam (general) (routine) w/o abn findings    Surveillance for Depo-Provera contraception  -     medroxyPROGESTERone (DEPO-PROVERA) 150 mg/mL injection; Inject 1 mL (150 mg total) into a muscle every 3 (three) months  -     medroxyPROGESTERone (DEPO-PROVERA) IM injection 150 mg    BMI 32 0-32 9,adult    Other orders  -     Cancel: Chlamydia/GC amplified DNA by PCR          Subjective:      Patient ID: Abbey Santana is a 22 y o  female  Abbey Santana is a 22 y o  female who is here today for her annual visit  No health concerns  No menses on DMPA  Rare spotting  Exercises 1-2 times per weeks  Abbey Santana is not currently sexually active or dating  Coitarche 15-consensual   She quit smoking one week ago and is currently vaping as a means to quit  She was smoking 1ppd  Started at age 16  Works FT at [employer]  Admits to unstable anxiety and depression currently  Following with a therapist and just left that appt   Admits to high anxiety right now and not being prepared for a pelvic exam  Admitted to a [facility] for LPN program  She has lived alone since age 26  Her mother is a drug addict  Has some contact with her father  The following portions of the patient's history were reviewed and updated as appropriate: allergies, current medications, past family history, past medical history, past social history, past surgical history and problem list     Review of Systems   Constitutional: Negative  Negative for activity change, appetite change, chills, diaphoresis, fatigue, fever and unexpected weight change  HENT: Negative for congestion, dental problem, sneezing, sore throat and trouble swallowing  Eyes: Negative for visual disturbance  Respiratory: Negative for chest tightness and shortness of breath  Cardiovascular: Negative for chest pain and leg swelling  Gastrointestinal: Positive for constipation and diarrhea  Negative for abdominal pain, nausea and vomiting  IBS   Genitourinary: Negative for difficulty urinating, dyspareunia, dysuria, frequency, hematuria, menstrual problem, pelvic pain, urgency, vaginal bleeding, vaginal discharge and vaginal pain  Musculoskeletal: Negative for back pain and neck pain  Skin: Negative  Allergic/Immunologic: Negative  Neurological: Negative for weakness and headaches  Hematological: Negative for adenopathy  Psychiatric/Behavioral: Negative  Objective:      /78 (BP Location: Left arm, Patient Position: Sitting, Cuff Size: Standard)   Ht 5' 8 5" (1 74 m)   Wt 99 1 kg (218 lb 6 4 oz)   BMI 32 72 kg/m²          Physical Exam  Vitals and nursing note reviewed  Constitutional:       Appearance: Normal appearance  She is well-developed  She is obese  HENT:      Head: Normocephalic and atraumatic  Eyes:      General:         Right eye: No discharge  Left eye: No discharge  Neck:      Thyroid: No thyromegaly        Trachea: Trachea normal    Cardiovascular:      Rate and Rhythm: Normal rate and regular rhythm  Heart sounds: Normal heart sounds  Pulmonary:      Effort: Pulmonary effort is normal       Breath sounds: Normal breath sounds  Chest:      Breasts: Breasts are symmetrical          Right: Normal  No inverted nipple, mass, nipple discharge, skin change or tenderness  Left: Normal  No inverted nipple, mass, nipple discharge, skin change or tenderness  Abdominal:      Palpations: Abdomen is soft  Genitourinary:     Comments: Pelvic exam deferred  Musculoskeletal:         General: Normal range of motion  Cervical back: Normal range of motion and neck supple  Lymphadenopathy:      Head:      Right side of head: No submental, submandibular or tonsillar adenopathy  Left side of head: No submental, submandibular or tonsillar adenopathy  Cervical: No cervical adenopathy  Upper Body:      Right upper body: No supraclavicular or axillary adenopathy  Left upper body: No supraclavicular or axillary adenopathy  Skin:     General: Skin is warm and dry  Neurological:      Mental Status: She is alert and oriented to person, place, and time     Psychiatric:         Mood and Affect: Mood normal          Behavior: Behavior normal

## 2021-09-03 NOTE — PATIENT INSTRUCTIONS
Pap smear to start at age 24 as per ASCCP guidelines  GC/CT cultures annually once sexually active  Declined today  Encouraged to return to office for GC/CT testing  Always condom use when sexually active  Return to office every 11-13 weeks for depo injection  Calcium 1000 mg plus Vit D 600 IU daily  Daily weight bearing exercises  Keep in mind DMPA may delay fertility up to one year after discontinuation  Condom use to protect against sexually transmitted infections  Use seat belt in every car ride, avoid smoking and alcohol use  Obtain appropriate nutrition and hydration  Follow up with PCP for appropriate vaccine schedule  HPV vaccine series has been completed  Calcium 1300 mg per day to age 25  Age 24-51 calcium 1000mg daily intake  Vit D daily recommended  Monthly breast self exam to start at age 23  Return to office in one year or sooner, if needed

## 2021-09-09 ENCOUNTER — TELEPHONE (OUTPATIENT)
Dept: FAMILY MEDICINE CLINIC | Facility: MEDICAL CENTER | Age: 21
End: 2021-09-09

## 2021-09-09 DIAGNOSIS — Z20.822 EXPOSURE TO COVID-19 VIRUS: Primary | ICD-10-CM

## 2021-09-09 NOTE — TELEPHONE ENCOUNTER
Quarantine for a minimum seven days from date of last exposure  Date of last exposure will be day one  Get COVID testing on day five  If symptomatic patient should get COVID testing now  If patient remains asymptomatic a day seven and her covid test has been negative on day five then she can go ahead and get the 2nd COVID vaccine  If she needs a note for work we can provide one

## 2021-09-09 NOTE — TELEPHONE ENCOUNTER
Pt aware-and will go for testing  Order placed  Last date of exposure was Monday  Hold for results, will most likely need a note but will check with employer

## 2021-09-09 NOTE — TELEPHONE ENCOUNTER
Pt is to get her 2nd covid shot today, and she's been in contact with her younger sibling who is covid positive  Pt wondering what she should do?

## 2021-09-11 ENCOUNTER — HOSPITAL ENCOUNTER (EMERGENCY)
Facility: HOSPITAL | Age: 21
Discharge: HOME | End: 2021-09-11
Attending: EMERGENCY MEDICINE
Payer: COMMERCIAL

## 2021-09-11 ENCOUNTER — NURSE TRIAGE (OUTPATIENT)
Dept: OTHER | Facility: OTHER | Age: 21
End: 2021-09-11

## 2021-09-11 VITALS
DIASTOLIC BLOOD PRESSURE: 87 MMHG | OXYGEN SATURATION: 99 % | RESPIRATION RATE: 18 BRPM | TEMPERATURE: 98.8 F | SYSTOLIC BLOOD PRESSURE: 135 MMHG | HEART RATE: 84 BPM

## 2021-09-11 DIAGNOSIS — J06.9 URI (UPPER RESPIRATORY INFECTION): Primary | ICD-10-CM

## 2021-09-11 DIAGNOSIS — Z20.822 CLOSE EXPOSURE TO COVID-19 VIRUS: ICD-10-CM

## 2021-09-11 LAB — SARS-COV-2 RNA RESP QL NAA+PROBE: NEGATIVE

## 2021-09-11 PROCEDURE — U0003 INFECTIOUS AGENT DETECTION BY NUCLEIC ACID (DNA OR RNA); SEVERE ACUTE RESPIRATORY SYNDROME CORONAVIRUS 2 (SARS-COV-2) (CORONAVIRUS DISEASE [COVID-19]), AMPLIFIED PROBE TECHNIQUE, MAKING USE OF HIGH THROUGHPUT TECHNOLOGIES AS DESCRIBED BY CMS-2020-01-R: HCPCS | Performed by: EMERGENCY MEDICINE

## 2021-09-11 PROCEDURE — U0005 INFEC AGEN DETEC AMPLI PROBE: HCPCS | Performed by: EMERGENCY MEDICINE

## 2021-09-11 PROCEDURE — 99283 EMERGENCY DEPT VISIT LOW MDM: CPT

## 2021-09-11 PROCEDURE — 99284 EMERGENCY DEPT VISIT MOD MDM: CPT | Performed by: EMERGENCY MEDICINE

## 2021-09-11 NOTE — TELEPHONE ENCOUNTER
Regarding: Covid-Symptomatic  ----- Message from Claude Deed sent at 9/11/2021  1:08 PM EDT -----  " I need to get Tested due to a Direct Exposure to my Little Sister who tested Positive   I am having Symptoms of SOB "

## 2021-09-11 NOTE — TELEPHONE ENCOUNTER
Reason for Disposition   MODERATE difficulty breathing (e g , speaks in phrases, SOB even at rest, pulse 100-120)    Answer Assessment - Initial Assessment Questions  1  Were you within 6 feet or less, for up to 15 minutes or more with a person that has a confirmed COVID-19 test?   yes    2  What was the date of your exposure? 9/6    3  Are you experiencing any symptoms attributed to the virus?  (Assess for SOB, cough, fever, difficulty breathing)   Reports she feels like she has allergy symptoms, fatigue, cough, congestion, headache, SOB feels like she has to catch her breath when talking,  Dizzy when walking      4  HIGH RISK: Do you have any history heart or lung conditions, weakened immune system, diabetes, Asthma, CHF, HIV, COPD, Chemo, renal failure, sickle cell, etc?   Asthma     5  PREGNANCY: Are you pregnant or did you recently give birth?    Denies    Protocols used: CORONAVIRUS (COVID-19) DIAGNOSED OR SUSPECTED-ADULT-

## 2021-09-11 NOTE — ED PROVIDER NOTES
History  Chief Complaint   Patient presents with    Biological Exposure     Exposure to covid+ family member  Jannets covid test      Leopold Peals is a 22 y o  female who was exposed to a covid positive person on Monday (6 days ago)  She started having mild body aches and uri symptoms on Thursday night  She has received the 1st of 2 covid vaccinations and was due for the second on Thursday when she started having symptoms  History provided by:  Patient   used: No    URI  Presenting symptoms: congestion and fatigue    Presenting symptoms: no fever    Severity:  Mild  Onset quality:  Gradual  Duration:  3 days  Timing:  Constant  Progression:  Unchanged  Chronicity:  New  Relieved by:  Nothing  Worsened by:  Nothing  Ineffective treatments:  None tried  Associated symptoms: myalgias    Risk factors: sick contacts        Prior to Admission Medications   Prescriptions Last Dose Informant Patient Reported? Taking?    ARIPiprazole (ABILIFY) 5 mg tablet  Self No No   Sig: Take 1/2 tab po qhs x 1 week, then increase to 1 full tab po qhs   Ferrous Sulfate (IRON) 325 (65 Fe) MG TABS  Self Yes No   Sig: Take 1 tablet by mouth daily    Loratadine (CLARITIN PO)  Self Yes No   Sig: Take by mouth daily    medroxyPROGESTERone (DEPO-PROVERA) 150 mg/mL injection   No No   Sig: Inject 1 mL (150 mg total) into a muscle every 3 (three) months   nystatin (MYCOSTATIN) 500,000 units/5 mL suspension  Self No No   Sig: Apply 5 mL (500,000 Units total) to the mouth or throat 4 (four) times a day   ondansetron (ZOFRAN) 4 mg tablet  Self No No   Sig: Take 1 tablet (4 mg total) by mouth every 8 (eight) hours as needed for nausea or vomiting      Facility-Administered Medications Last Administration Doses Remaining   medroxyPROGESTERone (DEPO-PROVERA) IM injection 150 mg 9/3/2021  3:18 PM           Past Medical History:   Diagnosis Date    Allergic     pollen; animal dander    Anemia     Anxiety     Asthma     Closed left ankle fracture     Depression     Dysmenorrhea     Influenza A 2020    Insomnia     Moderate single current episode of major depressive disorder ([facility] [address] ) 2017       Past Surgical History:   Procedure Laterality Date    ANKLE SURGERY Left     two screws       Family History   Problem Relation Age of Onset    Bipolar disorder Mother         Fentanyl per Pt    Drug abuse Mother     No Known Problems Father     Anxiety disorder Sister     Bipolar disorder Sister     Leukemia Paternal Aunt     Autism spectrum disorder Cousin     Bipolar disorder Half-Sister     Breast cancer Neg Hx     Ovarian cancer Neg Hx      I have reviewed and agree with the history as documented  E-Cigarette/Vaping    E-Cigarette Use Former User      E-Cigarette/Vaping Substances    Nicotine Yes     THC No     CBD No     Flavoring No     Other No     Unknown No      Social History     Tobacco Use    Smoking status: Former Smoker     Packs/day: 0 50     Years: 7 00     Pack years: 3 50     Types: Cigarettes     Quit date: 2021     Years since quittin 6    Smokeless tobacco: Never Used   Vaping Use    Vaping Use: Former    Substances: Nicotine   Substance Use Topics    Alcohol use: Not Currently     Comment: 1 glass of wine approx 1-2x weekly  Pt denies h/o abuse    Drug use: Yes     Types: Marijuana     Comment: medical marijuana       Review of Systems   Constitutional: Positive for fatigue  Negative for chills, diaphoresis and fever  HENT: Positive for congestion  Respiratory: Negative for shortness of breath  Cardiovascular: Negative for chest pain and palpitations  Gastrointestinal: Negative for diarrhea, nausea and vomiting  Genitourinary: Negative for dysuria and frequency  Musculoskeletal: Positive for myalgias  Skin: Negative for rash  All other systems reviewed and are negative  Physical Exam  Physical Exam  Vitals and nursing note reviewed     Constitutional: General: She is not in acute distress  Appearance: She is well-developed  HENT:      Head: Normocephalic and atraumatic  Eyes:      Extraocular Movements: Extraocular movements intact  Pupils: Pupils are equal, round, and reactive to light  Neck:      Vascular: No JVD  Cardiovascular:      Rate and Rhythm: Normal rate and regular rhythm  Heart sounds: Normal heart sounds  No murmur heard  No friction rub  No gallop  Pulmonary:      Effort: Pulmonary effort is normal  No respiratory distress  Breath sounds: Normal breath sounds  No wheezing or rales  Chest:      Chest wall: No tenderness  Musculoskeletal:         General: No tenderness  Normal range of motion  Cervical back: Normal range of motion  Skin:     General: Skin is warm and dry  Neurological:      General: No focal deficit present  Mental Status: She is alert and oriented to person, place, and time  Psychiatric:         Behavior: Behavior normal          Thought Content: Thought content normal          Judgment: Judgment normal          Vital Signs  ED Triage Vitals   Temperature Pulse Respirations Blood Pressure SpO2   09/11/21 1454 09/11/21 1443 09/11/21 1443 09/11/21 1443 09/11/21 1443   98 8 °F (37 1 °C) 84 18 135/87 99 %      Temp Source Heart Rate Source Patient Position - Orthostatic VS BP Location FiO2 (%)   09/11/21 1443 09/11/21 1443 09/11/21 1443 09/11/21 1443 --   Oral Monitor Sitting Left arm       Pain Score       --                  Vitals:    09/11/21 1443   BP: 135/87   Pulse: 84   Patient Position - Orthostatic VS: Sitting         Visual Acuity      ED Medications  Medications - No data to display    Diagnostic Studies  Results Reviewed     Procedure Component Value Units Date/Time    Novel Coronavirus [facility] [[id number]] Collected: 09/11/21 1459    Lab Status:  In process Specimen: Nares from Nose Updated: 09/11/21 1507                 No orders to display Procedures  Procedures         ED Course                                           MDM  Number of Diagnoses or Management Options  Close exposure to COVID-19 virus: new and requires workup  URI (upper respiratory infection): new and requires workup  Diagnosis management comments:   Background: 22 y o  female patient presents with an exposure to a covid positive patient with uri symptoms  Will swab for covid, will discharge pending results  Amount and/or Complexity of Data Reviewed  Clinical lab tests: ordered    Patient Progress  Patient progress: stable      Disposition  Final diagnoses:   URI (upper respiratory infection)   Close exposure to COVID-19 virus     Time reflects when diagnosis was documented in both MDM as applicable and the Disposition within this note     Time User Action Codes Description Comment    9/11/2021  3:13 PM Leopoldo Conquest B Add [J06 [id number]] URI (upper respiratory infection)     9/11/2021  3:13 PM Ashli Keith Add [[id number]] Close exposure to COVID-19 virus       ED Disposition     ED Disposition Condition Date/Time Comment    Discharge Stable Sat Sep 11, 2021  3:13 PM Maycol Mueller discharge to home/self care              Follow-up Information     Follow up With Specialties Details Why Contact Info    Latisha Brothers DO Family Medicine Schedule an appointment as soon as possible for a visit in 1 week  [address] [place] [address] [address]  [phone]            Discharge Medication List as of 9/11/2021  3:14 PM      CONTINUE these medications which have NOT CHANGED    Details   ARIPiprazole (ABILIFY) 5 mg tablet Take 1/2 tab po qhs x 1 week, then increase to 1 full tab po qhs, Normal      Ferrous Sulfate (IRON) 325 (65 Fe) MG TABS Take 1 tablet by mouth daily , Historical Med      Loratadine (CLARITIN PO) Take by mouth daily , Historical Med      medroxyPROGESTERone (DEPO-PROVERA) 150 mg/mL injection Inject 1 mL (150 mg total) into a muscle every 3 (three) months, Starting Fri 9/3/2021, Normal      nystatin (MYCOSTATIN) 500,000 units/5 mL suspension Apply 5 mL (500,000 Units total) to the mouth or throat 4 (four) times a day, Starting Wed 7/14/2021, Normal      ondansetron (ZOFRAN) 4 mg tablet Take 1 tablet (4 mg total) by mouth every 8 (eight) hours as needed for nausea or vomiting, Starting Wed 5/26/2021, Normal           No discharge procedures on file      PDMP Review       Value Time User    PDMP Reviewed  Yes 11/29/2019 10:26 PM Shad Ramirez MD          ED Provider  Electronically Signed by           Clarke Spear MD  09/11/21 6457

## 2021-09-13 NOTE — TELEPHONE ENCOUNTER
Patient aware test is negative   Plans to get retested at work to make sure she is negative so to get her 2nd covid shot

## 2021-09-18 ENCOUNTER — OFFICE VISIT (OUTPATIENT)
Dept: URGENT CARE | Facility: MEDICAL CENTER | Age: 21
End: 2021-09-18
Payer: COMMERCIAL

## 2021-09-18 VITALS
HEART RATE: 84 BPM | TEMPERATURE: 98.4 F | OXYGEN SATURATION: 99 % | RESPIRATION RATE: 18 BRPM | HEIGHT: 69 IN | WEIGHT: 218 LBS | BODY MASS INDEX: 32.29 KG/M2

## 2021-09-18 DIAGNOSIS — T50.Z95A ADVERSE EFFECT OF VACCINE, INITIAL ENCOUNTER: Primary | ICD-10-CM

## 2021-09-18 PROCEDURE — 99213 OFFICE O/P EST LOW 20 MIN: CPT | Performed by: PHYSICIAN ASSISTANT

## 2021-09-18 NOTE — PROGRESS NOTES
[facility]        NAME: Shahbaz Hanson is a 22 y o  female  : 2000    MRN: [id number]  DATE: 2021  TIME: 4:33 PM    Assessment and Plan   Adverse effect of vaccine, initial encounter [T50  Z95A]  1  Adverse effect of vaccine, initial encounter           Patient Instructions     Adverse effect of vaccine  Follow up with PCP in 3-5 days  Proceed to  ER if symptoms worsen  Chief Complaint     Chief Complaint   Patient presents with    Cold Like Symptoms     after recieving her second vaccine shot got very sick and missed work and needs note          History of Present Illness       23 y/o female presents c/o low grade fever (Tmax 100), generalized body aches x 2 days after receiving the first covid vaccine 2 days ago  Denies chest pain, cough, SOB  States the symptoms have improved but need a note for work      Review of Systems   Review of Systems   Constitutional: Positive for fever  Negative for activity change, appetite change, chills, diaphoresis and fatigue  HENT: Positive for congestion, ear pain and rhinorrhea  Negative for ear discharge, facial swelling, hearing loss, mouth sores, nosebleeds, postnasal drip, sinus pressure, sinus pain, sneezing, sore throat and voice change  Respiratory: Negative for apnea, cough, choking, chest tightness, shortness of breath, wheezing and stridor  Cardiovascular: Negative            Current Medications       Current Outpatient Medications:     ARIPiprazole (ABILIFY) 5 mg tablet, Take 1/2 tab po qhs x 1 week, then increase to 1 full tab po qhs, Disp: 30 tablet, Rfl: 3    Ferrous Sulfate (IRON) 325 (65 Fe) MG TABS, Take 1 tablet by mouth daily , Disp: , Rfl:     Loratadine (CLARITIN PO), Take by mouth daily , Disp: , Rfl:     medroxyPROGESTERone (DEPO-PROVERA) 150 mg/mL injection, Inject 1 mL (150 mg total) into a muscle every 3 (three) months, Disp: 1 mL, Rfl: 3    nystatin (MYCOSTATIN) 500,000 units/5 mL suspension, Apply 5 mL (500,000 Units total) to the mouth or throat 4 (four) times a day (Patient not taking: Reported on 9/18/2021), Disp: 250 mL, Rfl: 0    ondansetron (ZOFRAN) 4 mg tablet, Take 1 tablet (4 mg total) by mouth every 8 (eight) hours as needed for nausea or vomiting (Patient not taking: Reported on 9/18/2021), Disp: 20 tablet, Rfl: 0    Current Facility-Administered Medications:     medroxyPROGESTERone (DEPO-PROVERA) IM injection 150 mg, 150 mg, Intramuscular, Q3 Months, Tara Deshpande PA-C, 150 mg at 09/03/21 1518    Current Allergies     Allergies as of 09/18/2021    (No Known Allergies)            The following portions of the patient's history were reviewed and updated as appropriate: allergies, current medications, past family history, past medical history, past social history, past surgical history and problem list      Past Medical History:   Diagnosis Date    Allergic     pollen; animal dander    Anemia     Anxiety     Asthma     Closed left ankle fracture     Depression     Dysmenorrhea     Influenza A 2/28/2020    Insomnia     Moderate single current episode of major depressive disorder (RUSTca 75 ) 1/5/2017       Past Surgical History:   Procedure Laterality Date    ANKLE SURGERY Left 2014    two screws       Family History   Problem Relation Age of Onset    Bipolar disorder Mother         Fentanyl per Pt    Drug abuse Mother     No Known Problems Father     Anxiety disorder Sister     Bipolar disorder Sister     Leukemia Paternal Aunt     Autism spectrum disorder Cousin     Bipolar disorder Half-Sister     Breast cancer Neg Hx     Ovarian cancer Neg Hx          Medications have been verified  Objective   Pulse 84   Temp 98 4 °F (36 9 °C)   Resp 18   Ht 5' 8 5" (1 74 m)   Wt 98 9 kg (218 lb)   SpO2 99%   BMI 32 66 kg/m²        Physical Exam     Physical Exam  Constitutional:       General: She is not in acute distress  Appearance: She is well-developed  She is not diaphoretic  HENT:      Head: Normocephalic and atraumatic  Right Ear: Hearing, tympanic membrane, ear canal and external ear normal       Left Ear: Hearing, tympanic membrane, ear canal and external ear normal       Nose: Rhinorrhea present  Mouth/Throat:      Pharynx: Uvula midline  Cardiovascular:      Rate and Rhythm: Normal rate and regular rhythm  Heart sounds: Normal heart sounds  Pulmonary:      Effort: Pulmonary effort is normal       Breath sounds: Normal breath sounds  Musculoskeletal:      Cervical back: Normal range of motion and neck supple  Lymphadenopathy:      Cervical: Cervical adenopathy present

## 2021-09-18 NOTE — LETTER
"De Singh is a 28 y.o. female.   /56   Pulse 75   Temp 97.9 °F (36.6 °C)   Resp 12   Ht 170 cm (66.93\")   Wt 65.3 kg (144 lb)   LMP 09/15/2019   SpO2 98%   BMI 22.60 kg/m²   Past Medical History:   Diagnosis Date   • Anxiety    • Bipolar disorder (CMS/HCC)    • Depression      No Known Allergies       URI    This is a new problem. The current episode started yesterday. The problem has been unchanged. Associated symptoms include congestion, coughing, headaches, a plugged ear sensation, rhinorrhea and a sore throat. Pertinent negatives include no abdominal pain, chest pain, diarrhea, dysuria, ear pain, joint pain, joint swelling, nausea, neck pain, rash, sinus pain, sneezing, swollen glands, vomiting or wheezing.        The following portions of the patient's history were reviewed and updated as appropriate: allergies, current medications, past family history, past medical history, past social history, past surgical history and problem list.    Review of Systems   HENT: Positive for congestion, rhinorrhea and sore throat. Negative for ear pain, sinus pain and sneezing.    Respiratory: Positive for cough. Negative for wheezing.    Cardiovascular: Negative for chest pain.   Gastrointestinal: Negative for abdominal pain, diarrhea, nausea and vomiting.   Genitourinary: Negative for dysuria.   Musculoskeletal: Negative for joint pain and neck pain.   Skin: Negative for rash.   Neurological: Positive for headaches.       Objective   Physical Exam   Constitutional: She appears well-developed and well-nourished.  Non-toxic appearance. She appears ill.   HENT:   Head: Normocephalic and atraumatic.   Cardiovascular: Regular rhythm and normal heart sounds.   Pulmonary/Chest: Effort normal. She has no wheezes. She has no rhonchi. She has no rales.   Lymphadenopathy:     She has no cervical adenopathy.   Skin: Skin is warm and dry.       Assessment/Plan   Diagnoses and all orders for this " September 18, 2021     Patient: Jenny Rose   YOB: 2000   Date of Visit: 9/18/2021       To Whom it May Concern:    Jenny Rose was seen in my clinic on 9/18/2021  She may return to work on 9/22/21  If you have any questions or concerns, please don't hesitate to call           Sincerely,          [address] [facility] [place]        CC: Calleen Corpus visit:    Sore throat  -     Beta Strep Culture, Throat - Swab, Throat          Results for orders placed or performed in visit on 10/09/19   POC Rapid Strep A   Result Value Ref Range    Rapid Strep A Screen Negative Negative, VALID, INVALID, Not Performed    Internal Control Passed Passed    Lot Number [id number]     Expiration Date 10/31/2020

## 2021-11-09 ENCOUNTER — TELEPHONE (OUTPATIENT)
Dept: OBGYN CLINIC | Facility: CLINIC | Age: 21
End: 2021-11-09

## 2021-11-11 ENCOUNTER — TELEPHONE (OUTPATIENT)
Dept: PSYCHIATRY | Facility: CLINIC | Age: 21
End: 2021-11-11

## 2021-11-12 ENCOUNTER — TELEPHONE (OUTPATIENT)
Dept: PSYCHIATRY | Facility: CLINIC | Age: 21
End: 2021-11-12

## 2021-11-17 ENCOUNTER — TELEPHONE (OUTPATIENT)
Dept: PSYCHIATRY | Facility: CLINIC | Age: 21
End: 2021-11-17

## 2021-11-23 ENCOUNTER — TELEMEDICINE (OUTPATIENT)
Dept: PSYCHIATRY | Facility: CLINIC | Age: 21
End: 2021-11-23
Payer: COMMERCIAL

## 2021-11-23 DIAGNOSIS — F41.1 GENERALIZED ANXIETY DISORDER: ICD-10-CM

## 2021-11-23 DIAGNOSIS — F41.0 PANIC ATTACKS: ICD-10-CM

## 2021-11-23 DIAGNOSIS — F43.12 CHRONIC POST-TRAUMATIC STRESS DISORDER (PTSD): ICD-10-CM

## 2021-11-23 DIAGNOSIS — F31.81 BIPOLAR II DISORDER (HCC): Primary | ICD-10-CM

## 2021-11-23 DIAGNOSIS — G47.00 INSOMNIA, UNSPECIFIED TYPE: ICD-10-CM

## 2021-11-23 DIAGNOSIS — Z63.4 BEREAVEMENT: ICD-10-CM

## 2021-11-23 PROCEDURE — 99214 OFFICE O/P EST MOD 30 MIN: CPT | Performed by: PHYSICIAN ASSISTANT

## 2021-11-23 RX ORDER — ZIPRASIDONE HYDROCHLORIDE 20 MG/1
CAPSULE ORAL
Qty: 60 CAPSULE | Refills: 2 | Status: SHIPPED | OUTPATIENT
Start: 2021-11-23 | End: 2022-02-01 | Stop reason: SDUPTHER

## 2021-11-23 SDOH — SOCIAL STABILITY - SOCIAL INSECURITY: DISSAPEARANCE AND DEATH OF FAMILY MEMBER: Z63.4

## 2021-11-24 ENCOUNTER — TELEPHONE (OUTPATIENT)
Dept: PSYCHIATRY | Facility: CLINIC | Age: 21
End: 2021-11-24

## 2021-11-30 ENCOUNTER — CLINICAL SUPPORT (OUTPATIENT)
Dept: OBGYN CLINIC | Facility: MEDICAL CENTER | Age: 21
End: 2021-11-30
Payer: COMMERCIAL

## 2021-11-30 VITALS
SYSTOLIC BLOOD PRESSURE: 120 MMHG | WEIGHT: 210 LBS | BODY MASS INDEX: 31.1 KG/M2 | HEIGHT: 69 IN | DIASTOLIC BLOOD PRESSURE: 76 MMHG

## 2021-11-30 DIAGNOSIS — Z30.42 DEPO-PROVERA CONTRACEPTIVE STATUS: Primary | ICD-10-CM

## 2021-11-30 PROCEDURE — 96372 THER/PROPH/DIAG INJ SC/IM: CPT | Performed by: PHYSICIAN ASSISTANT

## 2021-11-30 RX ORDER — MEDROXYPROGESTERONE ACETATE 150 MG/ML
150 INJECTION, SUSPENSION INTRAMUSCULAR ONCE
Status: COMPLETED | OUTPATIENT
Start: 2021-11-30 | End: 2021-11-30

## 2021-11-30 RX ADMIN — MEDROXYPROGESTERONE ACETATE 150 MG: 150 INJECTION, SUSPENSION INTRAMUSCULAR at 13:14

## 2021-12-03 ENCOUNTER — TELEPHONE (OUTPATIENT)
Dept: PSYCHIATRY | Facility: CLINIC | Age: 21
End: 2021-12-03

## 2021-12-03 ENCOUNTER — TELEPHONE (OUTPATIENT)
Dept: OTHER | Facility: OTHER | Age: 21
End: 2021-12-03

## 2021-12-21 ENCOUNTER — OFFICE VISIT (OUTPATIENT)
Dept: FAMILY MEDICINE CLINIC | Facility: MEDICAL CENTER | Age: 21
End: 2021-12-21
Payer: COMMERCIAL

## 2021-12-21 ENCOUNTER — LAB (OUTPATIENT)
Dept: LAB | Facility: MEDICAL CENTER | Age: 21
End: 2021-12-21
Payer: COMMERCIAL

## 2021-12-21 VITALS
WEIGHT: 211 LBS | TEMPERATURE: 99.9 F | HEIGHT: 69 IN | BODY MASS INDEX: 31.25 KG/M2 | HEART RATE: 70 BPM | DIASTOLIC BLOOD PRESSURE: 70 MMHG | SYSTOLIC BLOOD PRESSURE: 110 MMHG

## 2021-12-21 DIAGNOSIS — Z00.00 PHYSICAL EXAM, ANNUAL: Primary | ICD-10-CM

## 2021-12-21 DIAGNOSIS — Z13.1 SCREENING FOR DIABETES MELLITUS: ICD-10-CM

## 2021-12-21 DIAGNOSIS — Z13.220 SCREENING FOR LIPID DISORDERS: ICD-10-CM

## 2021-12-21 DIAGNOSIS — Z13.29 SCREENING FOR THYROID DISORDER: ICD-10-CM

## 2021-12-21 LAB
ALBUMIN SERPL BCP-MCNC: 4.3 G/DL (ref 3.5–5)
ALP SERPL-CCNC: 58 U/L (ref 46–116)
ALT SERPL W P-5'-P-CCNC: 23 U/L (ref 12–78)
ANION GAP SERPL CALCULATED.3IONS-SCNC: 4 MMOL/L (ref 4–13)
AST SERPL W P-5'-P-CCNC: 9 U/L (ref 5–45)
BILIRUB SERPL-MCNC: 0.54 MG/DL (ref 0.2–1)
BUN SERPL-MCNC: 11 MG/DL (ref 5–25)
CALCIUM SERPL-MCNC: 9.5 MG/DL (ref 8.3–10.1)
CHLORIDE SERPL-SCNC: 108 MMOL/L (ref 100–108)
CHOLEST SERPL-MCNC: 142 MG/DL
CO2 SERPL-SCNC: 27 MMOL/L (ref 21–32)
CREAT SERPL-MCNC: 0.71 MG/DL (ref 0.6–1.3)
GFR SERPL CREATININE-BSD FRML MDRD: 122 ML/MIN/1.73SQ M
GLUCOSE P FAST SERPL-MCNC: 77 MG/DL (ref 65–99)
HDLC SERPL-MCNC: 57 MG/DL
LDLC SERPL CALC-MCNC: 72 MG/DL (ref 0–100)
POTASSIUM SERPL-SCNC: 3.8 MMOL/L (ref 3.5–5.3)
PROT SERPL-MCNC: 7.7 G/DL (ref 6.4–8.2)
SODIUM SERPL-SCNC: 139 MMOL/L (ref 136–145)
T4 FREE SERPL-MCNC: 1.07 NG/DL (ref 0.78–1.33)
TRIGL SERPL-MCNC: 66 MG/DL
TSH SERPL DL<=0.05 MIU/L-ACNC: 1.54 UIU/ML (ref 0.46–3.98)

## 2021-12-21 PROCEDURE — 99395 PREV VISIT EST AGE 18-39: CPT | Performed by: FAMILY MEDICINE

## 2021-12-21 PROCEDURE — 80061 LIPID PANEL: CPT

## 2021-12-21 PROCEDURE — 36415 COLL VENOUS BLD VENIPUNCTURE: CPT

## 2021-12-21 PROCEDURE — 84439 ASSAY OF FREE THYROXINE: CPT

## 2021-12-21 PROCEDURE — 80053 COMPREHEN METABOLIC PANEL: CPT

## 2021-12-21 PROCEDURE — 84443 ASSAY THYROID STIM HORMONE: CPT

## 2021-12-21 PROCEDURE — 83036 HEMOGLOBIN GLYCOSYLATED A1C: CPT

## 2021-12-22 LAB
EST. AVERAGE GLUCOSE BLD GHB EST-MCNC: 100 MG/DL
HBA1C MFR BLD: 5.1 %

## 2021-12-27 ENCOUNTER — APPOINTMENT (EMERGENCY)
Dept: RADIOLOGY | Facility: HOSPITAL | Age: 21
End: 2021-12-27
Payer: COMMERCIAL

## 2021-12-27 ENCOUNTER — TELEPHONE (OUTPATIENT)
Dept: FAMILY MEDICINE CLINIC | Facility: MEDICAL CENTER | Age: 21
End: 2021-12-27

## 2021-12-27 ENCOUNTER — APPOINTMENT (EMERGENCY)
Dept: CT IMAGING | Facility: HOSPITAL | Age: 21
End: 2021-12-27
Payer: COMMERCIAL

## 2021-12-27 ENCOUNTER — HOSPITAL ENCOUNTER (EMERGENCY)
Facility: HOSPITAL | Age: 21
Discharge: HOME | End: 2021-12-27
Attending: EMERGENCY MEDICINE
Payer: COMMERCIAL

## 2021-12-27 VITALS
BODY MASS INDEX: 31.25 KG/M2 | TEMPERATURE: 98 F | SYSTOLIC BLOOD PRESSURE: 116 MMHG | HEIGHT: 69 IN | OXYGEN SATURATION: 99 % | RESPIRATION RATE: 18 BRPM | HEART RATE: 82 BPM | DIASTOLIC BLOOD PRESSURE: 60 MMHG | WEIGHT: 211 LBS

## 2021-12-27 DIAGNOSIS — R19.7 NAUSEA VOMITING AND DIARRHEA: Primary | ICD-10-CM

## 2021-12-27 DIAGNOSIS — R11.2 NAUSEA VOMITING AND DIARRHEA: Primary | ICD-10-CM

## 2021-12-27 LAB
ALBUMIN SERPL BCP-MCNC: 4.6 G/DL (ref 3.5–5)
ALP SERPL-CCNC: 67 U/L (ref 46–116)
ALT SERPL W P-5'-P-CCNC: 17 U/L (ref 12–78)
ANION GAP SERPL CALCULATED.3IONS-SCNC: 12 MMOL/L (ref 4–13)
AST SERPL W P-5'-P-CCNC: 8 U/L (ref 5–45)
BASOPHILS # BLD AUTO: 0.03 THOUSANDS/ΜL (ref 0–0.1)
BASOPHILS NFR BLD AUTO: 0 % (ref 0–1)
BILIRUB SERPL-MCNC: 0.59 MG/DL (ref 0.2–1)
BUN SERPL-MCNC: 12 MG/DL (ref 5–25)
CALCIUM SERPL-MCNC: 9.6 MG/DL (ref 8.3–10.1)
CHLORIDE SERPL-SCNC: 104 MMOL/L (ref 100–108)
CO2 SERPL-SCNC: 24 MMOL/L (ref 21–32)
CREAT SERPL-MCNC: 0.74 MG/DL (ref 0.6–1.3)
EOSINOPHIL # BLD AUTO: 0.05 THOUSAND/ΜL (ref 0–0.61)
EOSINOPHIL NFR BLD AUTO: 0 % (ref 0–6)
ERYTHROCYTE [DISTWIDTH] IN BLOOD BY AUTOMATED COUNT: 13 % (ref 11.6–15.1)
EXT PREG TEST URINE: NEGATIVE
EXT. CONTROL ED NAV: NORMAL
FLUAV RNA RESP QL NAA+PROBE: NEGATIVE
FLUBV RNA RESP QL NAA+PROBE: NEGATIVE
GFR SERPL CREATININE-BSD FRML MDRD: 116 ML/MIN/1.73SQ M
GLUCOSE SERPL-MCNC: 116 MG/DL (ref 65–140)
HCT VFR BLD AUTO: 45.5 % (ref 34.8–46.1)
HGB BLD-MCNC: 15.5 G/DL (ref 11.5–15.4)
IMM GRANULOCYTES # BLD AUTO: 0.04 THOUSAND/UL (ref 0–0.2)
IMM GRANULOCYTES NFR BLD AUTO: 0 % (ref 0–2)
LIPASE SERPL-CCNC: 62 U/L (ref 73–393)
LYMPHOCYTES # BLD AUTO: 0.48 THOUSANDS/ΜL (ref 0.6–4.47)
LYMPHOCYTES NFR BLD AUTO: 4 % (ref 14–44)
MCH RBC QN AUTO: 29.1 PG (ref 26.8–34.3)
MCHC RBC AUTO-ENTMCNC: 34.1 G/DL (ref 31.4–37.4)
MCV RBC AUTO: 86 FL (ref 82–98)
MONOCYTES # BLD AUTO: 0.34 THOUSAND/ΜL (ref 0.17–1.22)
MONOCYTES NFR BLD AUTO: 3 % (ref 4–12)
NEUTROPHILS # BLD AUTO: 12.55 THOUSANDS/ΜL (ref 1.85–7.62)
NEUTS SEG NFR BLD AUTO: 93 % (ref 43–75)
NRBC BLD AUTO-RTO: 0 /100 WBCS
PLATELET # BLD AUTO: 361 THOUSANDS/UL (ref 149–390)
PMV BLD AUTO: 9.8 FL (ref 8.9–12.7)
POTASSIUM SERPL-SCNC: 4 MMOL/L (ref 3.5–5.3)
PROT SERPL-MCNC: 8.6 G/DL (ref 6.4–8.2)
RBC # BLD AUTO: 5.32 MILLION/UL (ref 3.81–5.12)
RSV RNA RESP QL NAA+PROBE: NEGATIVE
SARS-COV-2 RNA RESP QL NAA+PROBE: NEGATIVE
SODIUM SERPL-SCNC: 140 MMOL/L (ref 136–145)
WBC # BLD AUTO: 13.49 THOUSAND/UL (ref 4.31–10.16)

## 2021-12-27 PROCEDURE — 81025 URINE PREGNANCY TEST: CPT | Performed by: EMERGENCY MEDICINE

## 2021-12-27 PROCEDURE — 71045 X-RAY EXAM CHEST 1 VIEW: CPT

## 2021-12-27 PROCEDURE — 99284 EMERGENCY DEPT VISIT MOD MDM: CPT | Performed by: EMERGENCY MEDICINE

## 2021-12-27 PROCEDURE — 80053 COMPREHEN METABOLIC PANEL: CPT | Performed by: EMERGENCY MEDICINE

## 2021-12-27 PROCEDURE — 96375 TX/PRO/DX INJ NEW DRUG ADDON: CPT

## 2021-12-27 PROCEDURE — 96361 HYDRATE IV INFUSION ADD-ON: CPT

## 2021-12-27 PROCEDURE — 83690 ASSAY OF LIPASE: CPT | Performed by: EMERGENCY MEDICINE

## 2021-12-27 PROCEDURE — 96376 TX/PRO/DX INJ SAME DRUG ADON: CPT

## 2021-12-27 PROCEDURE — 74177 CT ABD & PELVIS W/CONTRAST: CPT

## 2021-12-27 PROCEDURE — 85025 COMPLETE CBC W/AUTO DIFF WBC: CPT | Performed by: EMERGENCY MEDICINE

## 2021-12-27 PROCEDURE — 0241U HB NFCT DS VIR RESP RNA 4 TRGT: CPT | Performed by: EMERGENCY MEDICINE

## 2021-12-27 PROCEDURE — G1004 CDSM NDSC: HCPCS

## 2021-12-27 PROCEDURE — 96374 THER/PROPH/DIAG INJ IV PUSH: CPT

## 2021-12-27 PROCEDURE — 36415 COLL VENOUS BLD VENIPUNCTURE: CPT

## 2021-12-27 PROCEDURE — 99284 EMERGENCY DEPT VISIT MOD MDM: CPT

## 2021-12-27 RX ORDER — CALCIUM CARBONATE 200(500)MG
500 TABLET,CHEWABLE ORAL DAILY PRN
Status: DISCONTINUED | OUTPATIENT
Start: 2021-12-27 | End: 2021-12-28 | Stop reason: HOSPADM

## 2021-12-27 RX ORDER — ONDANSETRON 2 MG/ML
4 INJECTION INTRAMUSCULAR; INTRAVENOUS ONCE
Status: COMPLETED | OUTPATIENT
Start: 2021-12-27 | End: 2021-12-27

## 2021-12-27 RX ORDER — ONDANSETRON 4 MG/1
4 TABLET, ORALLY DISINTEGRATING ORAL ONCE
Status: COMPLETED | OUTPATIENT
Start: 2021-12-27 | End: 2021-12-27

## 2021-12-27 RX ORDER — ONDANSETRON 4 MG/1
4 TABLET, ORALLY DISINTEGRATING ORAL EVERY 8 HOURS PRN
Qty: 5 TABLET | Refills: 0 | Status: SHIPPED | OUTPATIENT
Start: 2021-12-27 | End: 2021-12-29

## 2021-12-27 RX ORDER — KETOROLAC TROMETHAMINE 30 MG/ML
15 INJECTION, SOLUTION INTRAMUSCULAR; INTRAVENOUS ONCE
Status: COMPLETED | OUTPATIENT
Start: 2021-12-27 | End: 2021-12-27

## 2021-12-27 RX ADMIN — ONDANSETRON 4 MG: 2 INJECTION INTRAMUSCULAR; INTRAVENOUS at 21:34

## 2021-12-27 RX ADMIN — SODIUM CHLORIDE 1000 ML: 0.9 INJECTION, SOLUTION INTRAVENOUS at 18:36

## 2021-12-27 RX ADMIN — ONDANSETRON 4 MG: 4 TABLET, ORALLY DISINTEGRATING ORAL at 12:44

## 2021-12-27 RX ADMIN — KETOROLAC TROMETHAMINE 15 MG: 30 INJECTION, SOLUTION INTRAMUSCULAR at 18:36

## 2021-12-27 RX ADMIN — ONDANSETRON 4 MG: 2 INJECTION INTRAMUSCULAR; INTRAVENOUS at 18:36

## 2021-12-27 RX ADMIN — IOHEXOL 100 ML: 350 INJECTION, SOLUTION INTRAVENOUS at 20:15

## 2021-12-27 RX ADMIN — CALCIUM CARBONATE (ANTACID) CHEW TAB 500 MG 500 MG: 500 CHEW TAB at 21:34

## 2021-12-29 LAB
BILIRUB UR QL STRIP: ABNORMAL
CLARITY UR: CLEAR
COLOR UR: YELLOW
GLUCOSE UR STRIP-MCNC: NEGATIVE MG/DL
HGB UR QL STRIP.AUTO: NEGATIVE
KETONES UR STRIP-MCNC: ABNORMAL MG/DL
LEUKOCYTE ESTERASE UR QL STRIP: NEGATIVE
NITRITE UR QL STRIP: NEGATIVE
PH UR STRIP.AUTO: 5.5 [PH] (ref 4.5–8)
PROT UR STRIP-MCNC: ABNORMAL MG/DL
SP GR UR STRIP.AUTO: >=1.03 (ref 1–1.03)
UROBILINOGEN UR QL STRIP.AUTO: 0.2 E.U./DL

## 2022-01-27 ENCOUNTER — TELEPHONE (OUTPATIENT)
Dept: PSYCHIATRY | Facility: CLINIC | Age: 22
End: 2022-01-27
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
## 2022-01-27 NOTE — TELEPHONE ENCOUNTER
Patient called and asked if you can call in a new script for Geodon increased to 40mg qd  [facility] can send [facility] to [facility] in [place]

## 2022-01-27 NOTE — TELEPHONE ENCOUNTER
Myrna Pimentel must make an appt, no changes to medications before she is seen  She should have enough medicines to get through most of February 2022    I have room in my schedule 2/3/2022

## 2022-02-01 ENCOUNTER — TELEMEDICINE (OUTPATIENT)
Dept: PSYCHIATRY | Facility: CLINIC | Age: 22
End: 2022-02-01
Payer: COMMERCIAL

## 2022-02-01 DIAGNOSIS — F41.1 GENERALIZED ANXIETY DISORDER: ICD-10-CM

## 2022-02-01 DIAGNOSIS — F31.81 BIPOLAR II DISORDER (HCC): Primary | ICD-10-CM

## 2022-02-01 DIAGNOSIS — F41.0 PANIC ATTACKS: ICD-10-CM

## 2022-02-01 DIAGNOSIS — G47.09 OTHER INSOMNIA: ICD-10-CM

## 2022-02-01 PROBLEM — Z63.4 BEREAVEMENT: Status: RESOLVED | Noted: 2021-11-23 | Resolved: 2022-02-01

## 2022-02-01 PROCEDURE — 99214 OFFICE O/P EST MOD 30 MIN: CPT | Performed by: PHYSICIAN ASSISTANT

## 2022-02-01 RX ORDER — BUSPIRONE HYDROCHLORIDE 10 MG/1
TABLET ORAL
Qty: 30 TABLET | Refills: 1 | Status: SHIPPED | OUTPATIENT
Start: 2022-02-01 | End: 2022-03-24 | Stop reason: SINTOL

## 2022-02-01 RX ORDER — ZIPRASIDONE HYDROCHLORIDE 40 MG/1
40 CAPSULE ORAL
Qty: 30 CAPSULE | Refills: 1 | Status: SHIPPED | OUTPATIENT
Start: 2022-02-01 | End: 2022-03-24 | Stop reason: SDUPTHER

## 2022-02-15 ENCOUNTER — TELEPHONE (OUTPATIENT)
Dept: PSYCHIATRY | Facility: CLINIC | Age: 22
End: 2022-02-15

## 2022-02-15 NOTE — TELEPHONE ENCOUNTER
Patient called and is requesting a call back to discuss an issue with medication she is having  She does not recall the name of the medication, but would like to discuss switching to something different   Please advise

## 2022-02-16 ENCOUNTER — HOSPITAL ENCOUNTER (EMERGENCY)
Facility: HOSPITAL | Age: 22
Discharge: HOME | End: 2022-02-16
Attending: EMERGENCY MEDICINE
Payer: COMMERCIAL

## 2022-02-16 ENCOUNTER — APPOINTMENT (EMERGENCY)
Dept: RADIOLOGY | Facility: HOSPITAL | Age: 22
End: 2022-02-16
Payer: COMMERCIAL

## 2022-02-16 VITALS
DIASTOLIC BLOOD PRESSURE: 86 MMHG | OXYGEN SATURATION: 98 % | RESPIRATION RATE: 20 BRPM | HEART RATE: 93 BPM | SYSTOLIC BLOOD PRESSURE: 134 MMHG | TEMPERATURE: 98.6 F

## 2022-02-16 DIAGNOSIS — R07.89 ATYPICAL CHEST PAIN: Primary | ICD-10-CM

## 2022-02-16 LAB
FLUAV RNA RESP QL NAA+PROBE: NEGATIVE
FLUBV RNA RESP QL NAA+PROBE: NEGATIVE
RSV RNA RESP QL NAA+PROBE: NEGATIVE
SARS-COV-2 RNA RESP QL NAA+PROBE: NEGATIVE

## 2022-02-16 PROCEDURE — 99285 EMERGENCY DEPT VISIT HI MDM: CPT

## 2022-02-16 PROCEDURE — 71045 X-RAY EXAM CHEST 1 VIEW: CPT

## 2022-02-16 PROCEDURE — 99284 EMERGENCY DEPT VISIT MOD MDM: CPT | Performed by: EMERGENCY MEDICINE

## 2022-02-16 PROCEDURE — 93005 ELECTROCARDIOGRAM TRACING: CPT

## 2022-02-16 PROCEDURE — 0241U HB NFCT DS VIR RESP RNA 4 TRGT: CPT | Performed by: EMERGENCY MEDICINE

## 2022-02-17 ENCOUNTER — CLINICAL SUPPORT (OUTPATIENT)
Dept: OBGYN CLINIC | Facility: MEDICAL CENTER | Age: 22
End: 2022-02-17
Payer: COMMERCIAL

## 2022-02-17 ENCOUNTER — OFFICE VISIT (OUTPATIENT)
Dept: FAMILY MEDICINE CLINIC | Facility: MEDICAL CENTER | Age: 22
End: 2022-02-17
Payer: COMMERCIAL

## 2022-02-17 VITALS
RESPIRATION RATE: 16 BRPM | BODY MASS INDEX: 30.9 KG/M2 | TEMPERATURE: 98.5 F | WEIGHT: 206.2 LBS | SYSTOLIC BLOOD PRESSURE: 110 MMHG | DIASTOLIC BLOOD PRESSURE: 70 MMHG | HEART RATE: 88 BPM

## 2022-02-17 VITALS
SYSTOLIC BLOOD PRESSURE: 120 MMHG | WEIGHT: 206 LBS | DIASTOLIC BLOOD PRESSURE: 80 MMHG | BODY MASS INDEX: 30.51 KG/M2 | HEIGHT: 69 IN

## 2022-02-17 DIAGNOSIS — Z30.42 ENCOUNTER FOR DEPO-PROVERA CONTRACEPTION: Primary | ICD-10-CM

## 2022-02-17 DIAGNOSIS — B37.0 ORAL THRUSH: Primary | ICD-10-CM

## 2022-02-17 LAB
ATRIAL RATE: 92 BPM
P AXIS: 72 DEGREES
PR INTERVAL: 162 MS
QRS AXIS: 64 DEGREES
QRSD INTERVAL: 86 MS
QT INTERVAL: 368 MS
QTC INTERVAL: 455 MS
T WAVE AXIS: 44 DEGREES
VENTRICULAR RATE: 92 BPM

## 2022-02-17 PROCEDURE — 96372 THER/PROPH/DIAG INJ SC/IM: CPT | Performed by: STUDENT IN AN ORGANIZED HEALTH CARE EDUCATION/TRAINING PROGRAM

## 2022-02-17 PROCEDURE — 93010 ELECTROCARDIOGRAM REPORT: CPT | Performed by: INTERNAL MEDICINE

## 2022-02-17 PROCEDURE — 99213 OFFICE O/P EST LOW 20 MIN: CPT

## 2022-02-17 RX ORDER — MEDROXYPROGESTERONE ACETATE 150 MG/ML
150 INJECTION, SUSPENSION INTRAMUSCULAR
Status: SHIPPED | OUTPATIENT
Start: 2022-02-17

## 2022-02-17 RX ADMIN — MEDROXYPROGESTERONE ACETATE 150 MG: 150 INJECTION, SUSPENSION INTRAMUSCULAR at 14:12

## 2022-02-17 NOTE — PROGRESS NOTES
Assessment/Plan:    No problem-specific Assessment & Plan notes found for this encounter  1  Oral thrush  Advised patient to use Nystatin as directed and follow up with ENT for recurrent issue  - Ambulatory Referral to Otolaryngology; Future  - nystatin (MYCOSTATIN) 500,000 units/5 mL suspension; Apply 5 mL (500,000 Units total) to the mouth or throat 4 (four) times a day for 10 days Swish and spit  Dispense: 200 mL; Refill: 0      Subjective:      Patient ID: Jackson Salas is a 22 y o  female  22year old female presents with complaints of "I have thrush and whenever I use the mouthwash it gets better but then it comes back again "   Patient reports white discoloration to posterior tongue since last year which has been off and on  She reports relief of symptoms with use of Nystatin, however states when she is finished using it her symptoms return  Denies pain and itchiness however just states it is irritated and does not want it to get worse again  She reports this is now the third time she has had this issue  She is not currently using any treatments for this and has not seen a specialist at this point  No use of inhaled corticosteroids  No other complaints  The following portions of the patient's history were reviewed and updated as appropriate: allergies, current medications, past family history, past medical history, past social history and problem list     Review of Systems   Constitutional: Negative for appetite change, chills, fatigue and fever  HENT: Negative for dental problem, ear discharge, ear pain, facial swelling, postnasal drip, sinus pressure, sinus pain, sore throat, tinnitus, trouble swallowing and voice change  Discoloration of tongue     Eyes: Negative for photophobia, pain, discharge, redness, itching and visual disturbance  Respiratory: Negative for cough, chest tightness and shortness of breath  Cardiovascular: Negative for chest pain and palpitations  Gastrointestinal: Negative for abdominal pain and vomiting  Endocrine: Negative  Genitourinary: Negative for dysuria and hematuria  Musculoskeletal: Negative for arthralgias and back pain  Skin: Negative for color change and rash  Neurological: Negative for dizziness, seizures, syncope and headaches  Psychiatric/Behavioral: Negative for agitation, behavioral problems and confusion  All other systems reviewed and are negative  Objective:      /70 (Cuff Size: Large)   Pulse 88   Temp 98 5 °F (36 9 °C)   Resp 16   Wt 93 5 kg (206 lb 3 2 oz)   BMI 30 90 kg/m²          Physical Exam  Constitutional:       General: She is not in acute distress  Appearance: Normal appearance  She is not ill-appearing  HENT:      Head: Normocephalic and atraumatic  Right Ear: Tympanic membrane normal       Left Ear: Tympanic membrane normal       Nose: Nose normal       Mouth/Throat:      Mouth: Mucous membranes are moist       Tongue: Tongue lesions: mild posterior white discoloration  Tongue does not deviate from midline  Palate: No mass and lesions  Pharynx: Oropharynx is clear  No oropharyngeal exudate or posterior oropharyngeal erythema  Tonsils: No tonsillar exudate or tonsillar abscesses  Comments: No cracks noted in the tongue  Eyes:      Pupils: Pupils are equal, round, and reactive to light  Cardiovascular:      Rate and Rhythm: Normal rate  Pulses: Normal pulses  Pulmonary:      Effort: Pulmonary effort is normal  No respiratory distress  Musculoskeletal:         General: Normal range of motion  Cervical back: Normal range of motion and neck supple  Skin:     General: Skin is warm and dry  Neurological:      General: No focal deficit present  Mental Status: She is alert and oriented to person, place, and time  Psychiatric:         Mood and Affect: Mood normal          Behavior: Behavior normal          Thought Content:  Thought content normal                     Joaquin Alcantar, MAIKOL

## 2022-02-17 NOTE — ED PROVIDER NOTES
History  Chief Complaint   Patient presents with    Chest Pain     Increased chest pain  Per EMS, psych history takes geodon  Was previously taking risperidone, but gave her chest pain  NSR on monitor  Worried for COVID       History provided by:  Patient   used: No    Chest Pain  Pain location:  Substernal area  Pain quality: aching    Pain radiates to:  Does not radiate  Pain radiates to the back: no    Pain severity:  Moderate  Onset quality:  Gradual  Timing:  Constant  Progression:  Waxing and waning  Chronicity:  New  Relieved by:  Nothing  Worsened by:  Nothing tried  Ineffective treatments:  None tried  Associated symptoms: no abdominal pain, no back pain, no cough, no fever, no palpitations, no shortness of breath and not vomiting        Prior to Admission Medications   Prescriptions Last Dose Informant Patient Reported? Taking?    Ferrous Sulfate (IRON) 325 (65 Fe) MG TABS  Self Yes No   Sig: Take 1 tablet by mouth daily    Loratadine (CLARITIN PO)  Self Yes No   Sig: Take by mouth daily    busPIRone (BUSPAR) 10 mg tablet   No No   Sig: Take 1/2-1 tab po qAM   medroxyPROGESTERone (DEPO-PROVERA) 150 mg/mL injection   No No   Sig: Inject 1 mL (150 mg total) into a muscle every 3 (three) months   ondansetron (Zofran ODT) 4 mg disintegrating tablet   No No   Sig: Take 1 tablet (4 mg total) by mouth every 8 (eight) hours as needed for nausea or vomiting for up to 2 days   ziprasidone (GEODON) 40 mg capsule   No No   Sig: Take 1 capsule (40 mg total) by mouth daily with dinner      Facility-Administered Medications: None       Past Medical History:   Diagnosis Date    Allergic     pollen; animal dander    Anemia     Anxiety     Asthma     Closed left ankle fracture     Depression     Dysmenorrhea     Influenza A 2/28/2020    Insomnia     Moderate single current episode of major depressive disorder ([facility] [address] ) 1/5/2017       Past Surgical History:   Procedure Laterality Date    ANKLE SURGERY Left     two screws       Family History   Problem Relation Age of Onset    Bipolar disorder Mother         Fentanyl per Pt    Drug abuse Mother     No Known Problems Father     Anxiety disorder Sister     Bipolar disorder Sister     Leukemia Paternal Aunt     Autism spectrum disorder Cousin     Bipolar disorder Half-Sister     Breast cancer Neg Hx     Ovarian cancer Neg Hx      I have reviewed and agree with the history as documented  E-Cigarette/Vaping    E-Cigarette Use Current Every Day User      E-Cigarette/Vaping Substances    Nicotine Yes     THC No     CBD No     Flavoring No     Other No     Unknown No      Social History     Tobacco Use    Smoking status: Former Smoker     Packs/day: 0 50     Years: 7 00     Pack years: 3 50     Types: Cigarettes     Quit date: 2021     Years since quittin 1    Smokeless tobacco: Never Used   Vaping Use    Vaping Use: Every day    Substances: Nicotine   Substance Use Topics    Alcohol use: Not Currently     Comment: 1 glass of wine approx 1-2x weekly  Pt denies h/o abuse    Drug use: Yes     Types: Marijuana     Comment: medical marijuana       Review of Systems   Constitutional: Negative for chills and fever  HENT: Negative for ear pain and sore throat  Eyes: Negative for pain and visual disturbance  Respiratory: Negative for cough and shortness of breath  Cardiovascular: Positive for chest pain  Negative for palpitations  Gastrointestinal: Negative for abdominal pain and vomiting  Genitourinary: Negative for dysuria and hematuria  Musculoskeletal: Negative for arthralgias and back pain  Skin: Negative for color change and rash  Neurological: Negative for seizures and syncope  All other systems reviewed and are negative  Physical Exam  Physical Exam  Vitals and nursing note reviewed  Constitutional:       General: She is not in acute distress  Appearance: She is well-developed     HENT: Head: Normocephalic and atraumatic  Eyes:      Conjunctiva/sclera: Conjunctivae normal    Cardiovascular:      Rate and Rhythm: Normal rate and regular rhythm  Heart sounds: No murmur heard  Pulmonary:      Effort: Pulmonary effort is normal  No respiratory distress  Breath sounds: Normal breath sounds  Abdominal:      Palpations: Abdomen is soft  Tenderness: There is no abdominal tenderness  Musculoskeletal:      Cervical back: Neck supple  Skin:     General: Skin is warm and dry  Capillary Refill: Capillary refill takes less than 2 seconds  Neurological:      General: No focal deficit present  Mental Status: She is alert and oriented to person, place, and time  Vital Signs  ED Triage Vitals [02/16/22 1904]   Temperature Pulse Respirations Blood Pressure SpO2   98 6 °F (37 °C) 93 20 134/86 98 %      Temp Source Heart Rate Source Patient Position - Orthostatic VS BP Location FiO2 (%)   Oral Monitor Sitting Left arm --      Pain Score       --           Vitals:    02/16/22 1904   BP: 134/86   Pulse: 93   Patient Position - Orthostatic VS: Sitting         Visual Acuity      ED Medications  Medications - No data to display    Diagnostic Studies  Results Reviewed     Procedure Component Value Units Date/Time    COVID/FLU/RSV - 2 hour TAT [[id number]]  (Normal) Collected: 02/16/22 2009    Lab Status: Final result Specimen: Nares from Nose Updated: 02/16/22 2205     SARS-CoV-2 Negative     INFLUENZA A PCR Negative     INFLUENZA B PCR Negative     RSV PCR Negative    Narrative:      FOR PEDIATRIC PATIENTS - copy/paste COVID Guidelines URL to browser: [email or web address]    SARS-CoV-2 assay is a Nucleic Acid Amplification assay intended for the  qualitative detection of nucleic acid from SARS-CoV-2 in nasopharyngeal  swabs  Results are for the presumptive identification of SARS-CoV-2 RNA      Positive results are indicative of infection with SARS-CoV-2, the virus  causing COVID-19, but do not rule out bacterial infection or co-infection  with other viruses  Laboratories within the [place] and its  territories are required to report all positive results to the appropriate  public health authorities  Negative results do not preclude SARS-CoV-2  infection and should not be used as the sole basis for treatment or other  patient management decisions  Negative results must be combined with  clinical observations, patient history, and epidemiological information  This test has not been FDA cleared or approved  This test has been authorized by FDA under an Emergency Use Authorization  (EUA)  This test is only authorized for the duration of time the  declaration that circumstances exist justifying the authorization of the  emergency use of an in vitro diagnostic tests for detection of SARS-CoV-2  virus and/or diagnosis of COVID-19 infection under section 564(b)(1) of  the Act, 21 U  S C  073FJA-0(M)(7), unless the authorization is terminated  or revoked sooner  The test has been validated but independent review by FDA  and CLIA is pending  Test performed using [employer] GeneXpert: This RT-PCR assay targets N2,  a region unique to SARS-CoV-2  A conserved region in the E-gene was chosen  for pan-Sarbecovirus detection which includes SARS-CoV-2  XR chest 1 view portable   Final Result by Enrique Perez MD (02/17 7738)      No acute cardiopulmonary disease                    Workstation performed: [id number]                    Procedures  Procedures         ED Course                                             MDM  Number of Diagnoses or Management Options  Atypical chest pain: new and requires workup     Amount and/or Complexity of Data Reviewed  Clinical lab tests: reviewed and ordered  Tests in the radiology section of CPT®: ordered and reviewed  Review and summarize past medical records: yes  Independent visualization of images, tracings, or specimens: yes        Disposition  Final diagnoses:   Atypical chest pain     Time reflects when diagnosis was documented in both MDM as applicable and the Disposition within this note     Time User Action Codes Description Comment    2/16/2022  8:46 PM Brooke Gillespie Add [R07 89] Atypical chest pain       ED Disposition     ED Disposition Condition Date/Time Comment    Discharge Stable Wed Feb 16, 2022  8:45 PM Elizabeth Jvfranics discharge to home/self care  Follow-up Information     Follow up With Specialties Details Why Contact Info    Kiana Warren DO Family Medicine   [address] [place] [phone]            Discharge Medication List as of 2/16/2022  8:47 PM      CONTINUE these medications which have NOT CHANGED    Details   busPIRone (BUSPAR) 10 mg tablet Take 1/2-1 tab po qAM, Normal      Ferrous Sulfate (IRON) 325 (65 Fe) MG TABS Take 1 tablet by mouth daily , Historical Med      Loratadine (CLARITIN PO) Take by mouth daily , Historical Med      medroxyPROGESTERone (DEPO-PROVERA) 150 mg/mL injection Inject 1 mL (150 mg total) into a muscle every 3 (three) months, Starting Fri 9/3/2021, Normal      ondansetron (Zofran ODT) 4 mg disintegrating tablet Take 1 tablet (4 mg total) by mouth every 8 (eight) hours as needed for nausea or vomiting for up to 2 days, Starting Mon 12/27/2021, Until Wed 12/29/2021 at 2359, Normal      ziprasidone (GEODON) 40 mg capsule Take 1 capsule (40 mg total) by mouth daily with dinner, Starting Tue 2/1/2022, Normal             No discharge procedures on file      PDMP Review       Value Time User    PDMP Reviewed  Yes 11/29/2019 10:26 PM Geo Ferreira MD          ED Provider  Electronically Signed by           Nathalia Cummings MD  03/18/22 2335

## 2022-02-17 NOTE — PROGRESS NOTES
Pt is here for Depo Provera injection  Her last dose was 11/30/2021  Her annual exam was on 9/31/2021  Urine pregnancy test done: NO     Depo given in Right Buttock   Tolerated well  YES  Lot [id number] Exp 07/30/2024  Next dose due 5/5/2022--5/19/2022  Refill needed NO  NDC# 72532-609-37

## 2022-02-17 NOTE — PATIENT INSTRUCTIONS
Oral Candidiasis   WHAT YOU NEED TO KNOW:   Oral candidiasis, or thrush, is a fungal infection that affects the inside of your mouth  DISCHARGE INSTRUCTIONS:   Return to the emergency department if:   · You have trouble swallowing and your jaw and neck are stiff  · You are dizzy, thirsty, or have a dry mouth  · You are urinating little or not at all  · You cannot eat or drink because of the pain  Contact your healthcare provider if:   · You have a fever  · You have nausea, vomiting, or diarrhea  · Your signs and symptoms get worse, even after treatment  · You have questions or concerns about your condition or care  Medicines:   · Antifungal medicine  helps kill the fungus that caused your oral candidiasis  This medicine may be a pill or a solution that you gargle  Remove dentures before you gargle  · Take your medicine as directed  Contact your healthcare provider if you think your medicine is not helping or if you have side effects  Tell him of her if you are allergic to any medicine  Keep a list of the medicines, vitamins, and herbs you take  Include the amounts, and when and why you take them  Bring the list or the pill bottles to follow-up visits  Carry your medicine list with you in case of an emergency  Prevent oral candidiasis:  Brush your teeth, gums, and tongue after you eat and before you go to sleep  Use a toothbrush with soft bristles  See your dentist for regular exams  Remove your dentures when you sleep, or at least 6 hours each day  Clean your dentures and soak them in denture   Let them air dry after soaking  Follow up with your doctor as directed:  Write down your questions so you remember to ask them during your visits  © Copyright [employer] 2021 Information is for End User's use only and may not be sold, redistributed or otherwise used for commercial purposes   All illustrations and images included in CareNotes® are the copyrighted property of A  [employer]  or [facility]  The above information is an  only  It is not intended as medical advice for individual conditions or treatments  Talk to your doctor, nurse or pharmacist before following any medical regimen to see if it is safe and effective for you

## 2022-03-01 DIAGNOSIS — F31.81 BIPOLAR II DISORDER (HCC): ICD-10-CM

## 2022-03-01 RX ORDER — ZIPRASIDONE HYDROCHLORIDE 40 MG/1
40 CAPSULE ORAL
Qty: 30 CAPSULE | Refills: 1 | OUTPATIENT
Start: 2022-03-01

## 2022-03-01 NOTE — TELEPHONE ENCOUNTER
Left voicemail for patient to call office back to schedule a sooner appt with [facility]  Next follow up is 3/15/2022

## 2022-03-04 ENCOUNTER — TELEPHONE (OUTPATIENT)
Dept: PSYCHIATRY | Facility: CLINIC | Age: 22
End: 2022-03-04

## 2022-03-04 NOTE — TELEPHONE ENCOUNTER
Pt called and stated that she cant make her appt for next Tuesday but she did jena an appt or another day, she also stated that will be out of her med that dr had put refused on, she stated that she will run out on the 8th that if it cant be refilled til them she is ok with that she is just hoping that it will be aprroved for her not to miss a day taking it

## 2022-03-16 ENCOUNTER — TELEPHONE (OUTPATIENT)
Dept: PSYCHIATRY | Facility: CLINIC | Age: 22
End: 2022-03-16

## 2022-03-16 NOTE — LETTER
22     Aureliano Morales   : 2000   [address] [facility] [place] [address]       It is the policy of [facility] to monitor and manage appointments that have been no-showed or cancelled with less than 48-hour notice  This is necessary to ensure that we are able to provide timely access for all patients to our providers  Undue numbers of unutilized appointments delays necessary medical care for all patients  Dear Aureliano Morales : We are sorry that you missed your appointment with Cassy Luu PA-C on 3/15/2022  Your health and follow-up care are important to us  We want to make you aware that you do not have another appointment with Cassy Luu PA-C scheduled  Please be aware that our office policy states that if you 'no show' two Medication Management  appointments in a row without prior notice of cancellation, or three or more in a calendar year, you may be discharged from Medication Management  treatment  We want to bring this to your attention now to prevent an interruption of your care  If you have any questions about this policy, please call us at the number above  If we do not hear from you within 10 business days to make a follow up appointment, we will assume you are no longer interested in care here  Thank you in advance for your cooperation and assistance         Sincerely,      [address] [facility]  E Support Staff

## 2022-03-16 NOTE — TELEPHONE ENCOUNTER
NO-SHOW LETTER MAILED TO Kali Wong  ADDRESS: [address] [place] [address]    [place] [address]

## 2022-03-24 ENCOUNTER — TELEMEDICINE (OUTPATIENT)
Dept: PSYCHIATRY | Facility: CLINIC | Age: 22
End: 2022-03-24
Payer: COMMERCIAL

## 2022-03-24 ENCOUNTER — TELEPHONE (OUTPATIENT)
Dept: PSYCHIATRY | Facility: CLINIC | Age: 22
End: 2022-03-24

## 2022-03-24 DIAGNOSIS — G47.09 OTHER INSOMNIA: ICD-10-CM

## 2022-03-24 DIAGNOSIS — F41.0 PANIC ATTACKS: ICD-10-CM

## 2022-03-24 DIAGNOSIS — F41.1 GENERALIZED ANXIETY DISORDER: ICD-10-CM

## 2022-03-24 DIAGNOSIS — R29.818 EXTRAPYRAMIDAL SYMPTOM: ICD-10-CM

## 2022-03-24 DIAGNOSIS — Z79.899 ENCOUNTER FOR LONG-TERM (CURRENT) USE OF HIGH-RISK MEDICATION: ICD-10-CM

## 2022-03-24 DIAGNOSIS — F31.81 BIPOLAR II DISORDER (HCC): Primary | ICD-10-CM

## 2022-03-24 DIAGNOSIS — F43.12 CHRONIC POST-TRAUMATIC STRESS DISORDER (PTSD): ICD-10-CM

## 2022-03-24 DIAGNOSIS — F40.10 SOCIAL ANXIETY DISORDER: ICD-10-CM

## 2022-03-24 PROCEDURE — 99214 OFFICE O/P EST MOD 30 MIN: CPT | Performed by: PHYSICIAN ASSISTANT

## 2022-03-24 RX ORDER — BENZTROPINE MESYLATE 1 MG/1
TABLET ORAL
Qty: 30 TABLET | Refills: 1 | Status: SHIPPED | OUTPATIENT
Start: 2022-03-24 | End: 2022-04-21 | Stop reason: ALTCHOICE

## 2022-03-24 RX ORDER — ZIPRASIDONE HYDROCHLORIDE 20 MG/1
20 CAPSULE ORAL
Qty: 30 CAPSULE | Refills: 1 | Status: SHIPPED | OUTPATIENT
Start: 2022-03-24 | End: 2022-04-21 | Stop reason: SINTOL

## 2022-03-24 RX ORDER — PROMETHAZINE HYDROCHLORIDE 25 MG/1
TABLET ORAL
Qty: 90 TABLET | Refills: 1 | Status: SHIPPED | OUTPATIENT
Start: 2022-03-24 | End: 2022-04-21 | Stop reason: SDUPTHER
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
## 2022-03-24 NOTE — BH TREATMENT PLAN
TREATMENT PLAN (Medication Management Only)        [facility]    Name and Date of Birth:  Chip Bazan 22 y o  2000  Date of Treatment Plan: March 24, 2022  Diagnosis/Diagnoses:    1  Bipolar II disorder ([address] )    2  Generalized anxiety disorder    3  Social anxiety disorder    4  Panic attacks    5  Chronic post-traumatic stress disorder (PTSD)    6  Other insomnia    7  Encounter for long-term (current) use of high-risk medication    8  Extrapyramidal symptom      Strengths/Personal Resources for Self-Care: "I never give up; I always keep pushing; I guess I work really hard for what I got"  Area/Areas of need (in own words): "A week after I started that new anxiety medicine I had to stop taking it because it made my anxiety worse "  1  Long Term Goal: Maintain mood stability, control of anxiety, panic, PTSD, and EPS  Target Date:3-6 months  Person/Persons responsible for completion of goal: Reg Rey  2  Short Term Objective (s) - How will we reach this goal?:   A  Provider new recommended medication/dosage changes and/or continue medication(s): Pt is having increased anxiety, with panic attacks, and ANGIE 7 score is 19 now  She also had a recent side effects on the Ziprasidone 40mg (but she felt well enough without any problems on the 20mg dose and would like to go back to this)  If manic Sxs start to creep back in, Pt must call me immediately to start a different mood medicine  She also had a side effect on Buspirone precluding its further use  I will formally D/C the Buspirone  Tx options discussed and Pt accepts to start Promethazine off label for anxiety and sleep facilitation, and Benztropine for EPS twitches  Pt will get the FMLA papers --to allow for 2 days per month intermittent leave until her psychiatric symptoms are under control     I recommended psychotherapy but Pt states she does not have time in her schedule for this at this time    Treatment plan done and Pt accepts the plan  Treatment Plan done but not signed at time of office visit due to:  Plan reviewed by phone or in person  and verbal consent given due to COVID social distancing  D/C Buspirone  Start in 1 week: Benztropine 1mg (1/2-1) tab po qhs prn twitching # 30 R1  Start Promethazine 25mg (1/2-1) tab po qd-tid prn anxiety # 90 R1  Reduce Ziprasidone 20mg (1) cap po qd with dinner # 30 R1  Medical Cannabis per outside provider  Get an EKG in 2 weeks  Return 4/28/2022 at 3:30PM, call sooner prn            Target Date:3-6 months  Person/Persons Responsible for Completion of Goal: Feliberto Mejia  Progress Towards Goals: stable, continuing treatment  Treatment Modality: Medication mgt  Review due 180 days from date of this plan: 6 months - 9/24/2022  Expected length of service: ongoing treatment  My Physician/PA/NP and I have developed this plan together and I agree to work on the goals and objectives  I understand the treatment goals that were developed for my treatment

## 2022-04-14 ENCOUNTER — OFFICE VISIT (OUTPATIENT)
Dept: LAB | Facility: HOSPITAL | Age: 22
End: 2022-04-14
Payer: COMMERCIAL

## 2022-04-14 DIAGNOSIS — F41.1 GENERALIZED ANXIETY DISORDER: ICD-10-CM

## 2022-04-14 DIAGNOSIS — F43.12 CHRONIC POST-TRAUMATIC STRESS DISORDER (PTSD): ICD-10-CM

## 2022-04-14 DIAGNOSIS — F41.0 PANIC ATTACKS: ICD-10-CM

## 2022-04-14 DIAGNOSIS — Z79.899 ENCOUNTER FOR LONG-TERM (CURRENT) USE OF HIGH-RISK MEDICATION: ICD-10-CM

## 2022-04-14 DIAGNOSIS — F31.81 BIPOLAR II DISORDER (HCC): ICD-10-CM

## 2022-04-14 DIAGNOSIS — F40.10 SOCIAL ANXIETY DISORDER: ICD-10-CM

## 2022-04-14 LAB
ATRIAL RATE: 97 BPM
P AXIS: 35 DEGREES
PR INTERVAL: 134 MS
QRS AXIS: 16 DEGREES
QRSD INTERVAL: 84 MS
QT INTERVAL: 348 MS
QTC INTERVAL: 441 MS
T WAVE AXIS: 10 DEGREES
VENTRICULAR RATE: 97 BPM

## 2022-04-14 PROCEDURE — 93010 ELECTROCARDIOGRAM REPORT: CPT | Performed by: INTERNAL MEDICINE

## 2022-04-14 PROCEDURE — 93005 ELECTROCARDIOGRAM TRACING: CPT

## 2022-04-15 ENCOUNTER — TELEPHONE (OUTPATIENT)
Dept: FAMILY MEDICINE CLINIC | Facility: MEDICAL CENTER | Age: 22
End: 2022-04-15

## 2022-04-15 ENCOUNTER — TELEPHONE (OUTPATIENT)
Dept: PSYCHIATRY | Facility: CLINIC | Age: 22
End: 2022-04-15

## 2022-04-15 NOTE — TELEPHONE ENCOUNTER
I left msg on Pt's cell/home phone # of record and left msg on her voice mail that nothing looked overtly concerning  She can also review with PCP if she would like for additional input

## 2022-04-15 NOTE — TELEPHONE ENCOUNTER
Pt called and stated that you put in labs EKG work for her to get done and she is looking to speak to you asap bc she is wondering what it means   Please call pt when available   Please call pt after work as she will be at work til then

## 2022-04-15 NOTE — TELEPHONE ENCOUNTER
Please call patient and inform her she should touch base with her Psychiatrist to review these results because they are the ordering provider for this  They also have done prior ECGs on her and know her results and medications

## 2022-04-15 NOTE — TELEPHONE ENCOUNTER
Pt called to ask if Jadyn Saleh could review her two ECG results  She said her psychiatrist ordered them and there are abnormal results  She has not called her psychiatrist to review them with her, but asked if Jadyn Saleh could please review them for her      Routed to Jadyn Saleh
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
## 2022-04-21 ENCOUNTER — TELEMEDICINE (OUTPATIENT)
Dept: PSYCHIATRY | Facility: CLINIC | Age: 22
End: 2022-04-21
Payer: COMMERCIAL

## 2022-04-21 DIAGNOSIS — F41.1 GENERALIZED ANXIETY DISORDER: ICD-10-CM

## 2022-04-21 DIAGNOSIS — R29.818 EXTRAPYRAMIDAL SYMPTOM: ICD-10-CM

## 2022-04-21 DIAGNOSIS — F43.12 CHRONIC POST-TRAUMATIC STRESS DISORDER (PTSD): ICD-10-CM

## 2022-04-21 DIAGNOSIS — F41.0 PANIC ATTACKS: ICD-10-CM

## 2022-04-21 DIAGNOSIS — F31.81 BIPOLAR II DISORDER (HCC): Primary | ICD-10-CM

## 2022-04-21 PROCEDURE — 99214 OFFICE O/P EST MOD 30 MIN: CPT | Performed by: PHYSICIAN ASSISTANT

## 2022-04-21 RX ORDER — OXCARBAZEPINE 150 MG/1
150 TABLET, FILM COATED ORAL
Qty: 30 TABLET | Refills: 1 | Status: SHIPPED | OUTPATIENT
Start: 2022-04-21 | End: 2022-06-09 | Stop reason: SDDI

## 2022-04-21 RX ORDER — PROMETHAZINE HYDROCHLORIDE 25 MG/1
TABLET ORAL
Qty: 90 TABLET | Refills: 0 | Status: SHIPPED | OUTPATIENT
Start: 2022-04-21 | End: 2022-06-09 | Stop reason: SDUPTHER

## 2022-04-25 ENCOUNTER — TELEPHONE (OUTPATIENT)
Dept: PSYCHIATRY | Facility: CLINIC | Age: 22
End: 2022-04-25

## 2022-05-04 RX ORDER — BENZTROPINE MESYLATE 1 MG/1
TABLET ORAL
COMMUNITY
Start: 2022-04-21

## 2022-05-04 RX ORDER — IBUPROFEN 600 MG/1
TABLET ORAL
COMMUNITY
Start: 2022-03-17

## 2022-05-04 RX ORDER — ZIPRASIDONE HYDROCHLORIDE 20 MG/1
CAPSULE ORAL
COMMUNITY
Start: 2022-04-21

## 2022-05-05 ENCOUNTER — OFFICE VISIT (OUTPATIENT)
Dept: OBGYN CLINIC | Facility: MEDICAL CENTER | Age: 22
End: 2022-05-05
Payer: COMMERCIAL

## 2022-05-05 ENCOUNTER — TELEPHONE (OUTPATIENT)
Dept: OBGYN CLINIC | Facility: CLINIC | Age: 22
End: 2022-05-05

## 2022-05-05 VITALS
HEIGHT: 69 IN | BODY MASS INDEX: 30.96 KG/M2 | WEIGHT: 209 LBS | DIASTOLIC BLOOD PRESSURE: 70 MMHG | SYSTOLIC BLOOD PRESSURE: 120 MMHG

## 2022-05-05 DIAGNOSIS — Z30.42 SURVEILLANCE FOR DEPO-PROVERA CONTRACEPTION: ICD-10-CM

## 2022-05-05 DIAGNOSIS — Z30.42 ENCOUNTER FOR DEPO-PROVERA CONTRACEPTION: Primary | ICD-10-CM

## 2022-05-05 PROCEDURE — 96372 THER/PROPH/DIAG INJ SC/IM: CPT

## 2022-05-05 RX ORDER — MEDROXYPROGESTERONE ACETATE 150 MG/ML
150 INJECTION, SUSPENSION INTRAMUSCULAR
Qty: 1 ML | Refills: 2 | Status: SHIPPED | OUTPATIENT
Start: 2022-05-05

## 2022-05-05 RX ORDER — MEDROXYPROGESTERONE ACETATE 150 MG/ML
150 INJECTION, SUSPENSION INTRAMUSCULAR
Status: SHIPPED | OUTPATIENT
Start: 2022-05-05

## 2022-05-05 RX ADMIN — MEDROXYPROGESTERONE ACETATE 150 MG: 150 INJECTION, SUSPENSION INTRAMUSCULAR at 14:06

## 2022-05-05 NOTE — TELEPHONE ENCOUNTER
Pts depo is scheduled for 1:00 today 5/5 in [place] & her depo is NOT  At pharm,  pls call this in ASAP  [facility] [place],  Thanks

## 2022-05-05 NOTE — PROGRESS NOTES
Pt is here for Depo Provera injection   Her last dose was 2/17/2022  Her annual exam was on 9/3/2021   Urine pregnancy test done: NO    Depo given in left buttock area  Tolerated well yes  Lot [id number] Exp 07/31/2024  Next dose due 7/21/2022  Refill needed NO

## 2022-05-11 ENCOUNTER — TELEPHONE (OUTPATIENT)
Dept: PSYCHIATRY | Facility: CLINIC | Age: 22
End: 2022-05-11

## 2022-05-16 ENCOUNTER — TELEPHONE (OUTPATIENT)
Dept: PSYCHIATRY | Facility: CLINIC | Age: 22
End: 2022-05-16

## 2022-05-16 NOTE — LETTER
22     Jean-Pierre Peralta   : 2000   [address] [place] [address]       It is the policy of [facility] to monitor and manage appointments that have been no-showed or cancelled with less than 48-hour notice  This is necessary to ensure that we are able to provide timely access for all patients to our providers  Undue numbers of unutilized appointments delays necessary medical care for all patients  Dear Jean-Pierre Peralta : We are sorry that you missed your appointment with Steve Carrel, PA-C on 5/10/2022  Your health and follow-up care are important to us  We want to make you aware that you do not have another appointment with Steve Carrel, PA-C scheduled  Please be aware that our office policy states that if you 'no show' two Medication Management  appointments in a row without prior notice of cancellation, or three or more in a calendar year, you may be discharged from Medication Management  treatment  We want to bring this to your attention now to prevent an interruption of your care  If you have any questions about this policy, please call us at the number above  If we do not hear from you within 10 business days to make a follow up appointment, we will assume you are no longer interested in care here  Thank you in advance for your cooperation and assistance         Sincerely,      [address] [facility] [address]

## 2022-05-16 NOTE — TELEPHONE ENCOUNTER
[facility]  ADDRESS: [address]   [place] [address]

## 2022-05-22 ENCOUNTER — OFFICE VISIT (OUTPATIENT)
Dept: URGENT CARE | Facility: MEDICAL CENTER | Age: 22
End: 2022-05-22
Payer: COMMERCIAL

## 2022-05-22 VITALS
HEART RATE: 108 BPM | RESPIRATION RATE: 18 BRPM | BODY MASS INDEX: 31.67 KG/M2 | TEMPERATURE: 98.7 F | WEIGHT: 209 LBS | HEIGHT: 68 IN | OXYGEN SATURATION: 100 %

## 2022-05-22 DIAGNOSIS — J03.90 ACUTE TONSILLITIS, UNSPECIFIED ETIOLOGY: Primary | ICD-10-CM

## 2022-05-22 PROCEDURE — 99213 OFFICE O/P EST LOW 20 MIN: CPT | Performed by: PHYSICIAN ASSISTANT

## 2022-05-22 RX ORDER — AMOXICILLIN 875 MG/1
875 TABLET, COATED ORAL 2 TIMES DAILY
Qty: 20 TABLET | Refills: 0 | Status: SHIPPED | OUTPATIENT
Start: 2022-05-22 | End: 2022-06-01

## 2022-05-22 NOTE — PROGRESS NOTES
[facility]        NAME: Gwendolyn Wang is a 22 y o  female  : 2000    MRN: [id number]  DATE: May 22, 2022  TIME: 8:32 AM    Assessment and Plan   Acute tonsillitis, unspecified etiology [J03 90]  1  Acute tonsillitis, unspecified etiology  amoxicillin (AMOXIL) 875 mg tablet         Patient Instructions   1  Over-the-counter ibuprofen and/or acetaminophen as needed for pain or fever  2  Gargle salt water as needed for sore throat pain relief  3  Increase oral fluids  4  Observe a liquid and/or soft diet until symptoms improve  5  Go to the ER immediately for any worsening symptoms, inability to swallow, shortness of breath, or any other ominous symptoms  6  Follow-up with ear nose and throat for any persistent or recurrent symptoms  Chief Complaint     Chief Complaint   Patient presents with    Sore Throat     Sore throat x1 week; hx of tonsillitis/strep; patient states she feels like she's swallowing razor blades          History of Present Illness       23-year-old female patient with 1 week history of persistent significant sore throat  Patient denies any nasal congestion, cough, fever, chills  No GI symptoms  No abdominal pain  She states that she is very prone to strep tonsillitis and therefore began 2-3 days of amoxicillin that she had lying around at home and it has not improved her symptoms  Review of Systems   Review of Systems   Constitutional: Negative for chills and fever  HENT: Positive for sore throat  Negative for ear pain  Eyes: Negative for pain and visual disturbance  Respiratory: Negative for cough and shortness of breath  Cardiovascular: Negative for chest pain and palpitations  Gastrointestinal: Negative for abdominal pain and vomiting  Genitourinary: Negative for dysuria and hematuria  Musculoskeletal: Negative for arthralgias and back pain  Skin: Negative for color change and rash  Neurological: Negative for seizures and syncope  All other systems reviewed and are negative  Current Medications       Current Outpatient Medications:     amoxicillin (AMOXIL) 875 mg tablet, Take 1 tablet (875 mg total) by mouth in the morning and 1 tablet (875 mg total) in the evening  Do all this for 10 days  , Disp: 20 tablet, Rfl: 0    benztropine (COGENTIN) 1 mg tablet, , Disp: , Rfl:     Ferrous Sulfate (IRON) 325 (65 Fe) MG TABS, Take 1 tablet by mouth daily , Disp: , Rfl:     ibuprofen (MOTRIN) 600 mg tablet, TAKE 1 TABLET BY MOUTH EVERY 4 TO 6 HOURS OR AS NEEDED, Disp: , Rfl:     Loratadine (CLARITIN PO), Take by mouth daily , Disp: , Rfl:     medroxyPROGESTERone (DEPO-PROVERA) 150 mg/mL injection, Inject 1 mL (150 mg total) into a muscle every 3 (three) months, Disp: 1 mL, Rfl: 2    ondansetron (Zofran ODT) 4 mg disintegrating tablet, Take 1 tablet (4 mg total) by mouth every 8 (eight) hours as needed for nausea or vomiting for up to 2 days, Disp: 5 tablet, Rfl: 0    OXcarbazepine (Trileptal) 150 mg tablet, Take 1 tablet (150 mg total) by mouth daily at bedtime, Disp: 30 tablet, Rfl: 1    promethazine (PHENERGAN) 25 mg tablet, Take 1/2 - 1 tab po qd-tid prn anxiety, Disp: 90 tablet, Rfl: 0    ziprasidone (GEODON) 20 mg capsule, , Disp: , Rfl:     Current Facility-Administered Medications:     medroxyPROGESTERone (DEPO-PROVERA) IM injection 150 mg, 150 mg, Intramuscular, Q3 Months, [facility] [place], CRNP, 150 mg at 02/17/22 1412    medroxyPROGESTERone (DEPO-PROVERA) IM injection 150 mg, 150 mg, Intramuscular, Q3 Months, [facility] [place], CRNP, 150 mg at 05/05/22 1406    Current Allergies     Allergies as of 05/22/2022    (No Known Allergies)            The following portions of the patient's history were reviewed and updated as appropriate: allergies, current medications, past family history, past medical history, past social history, past surgical history and problem list      Past Medical History:   Diagnosis Date    Allergic     pollen; animal dander    Anemia     Anxiety     Asthma     Closed left ankle fracture     Depression     Dysmenorrhea     Influenza A 2/28/2020    Insomnia     Moderate single current episode of major depressive disorder ([address] ) 1/5/2017       Past Surgical History:   Procedure Laterality Date    ANKLE SURGERY Left 2014    two screws       Family History   Problem Relation Age of Onset    Bipolar disorder Mother         Fentanyl per Pt    Drug abuse Mother     No Known Problems Father     Anxiety disorder Sister     Bipolar disorder Sister     Leukemia Paternal Aunt     Autism spectrum disorder Cousin     Bipolar disorder Half-Sister     Breast cancer Neg Hx     Ovarian cancer Neg Hx          Medications have been verified  Objective   Pulse (!) 108   Temp 98 7 °F (37 1 °C)   Resp 18   Ht 5' 8" (1 727 m)   Wt 94 8 kg (209 lb)   SpO2 100%   BMI 31 78 kg/m²        Physical Exam     Physical Exam  Vitals and nursing note reviewed  Constitutional:       General: She is not in acute distress  Appearance: Normal appearance  She is not ill-appearing or toxic-appearing  HENT:      Head: Normocephalic  Right Ear: Tympanic membrane normal       Left Ear: Tympanic membrane normal       Nose: No congestion  Mouth/Throat:      Mouth: Mucous membranes are moist       Pharynx: Pharyngeal swelling and posterior oropharyngeal erythema present  Tonsils: No tonsillar exudate  2+ on the right  2+ on the left  Eyes:      Extraocular Movements:      Right eye: Normal extraocular motion  Left eye: Normal extraocular motion  Conjunctiva/sclera: Conjunctivae normal       Pupils: Pupils are equal, round, and reactive to light  Cardiovascular:      Rate and Rhythm: Normal rate and regular rhythm  Pulses: Normal pulses  Heart sounds: Normal heart sounds  Pulmonary:      Effort: Pulmonary effort is normal       Breath sounds: Normal breath sounds  Musculoskeletal:      Cervical back: Normal range of motion  Skin:     General: Skin is warm and dry  Neurological:      Mental Status: She is alert  Psychiatric:         Mood and Affect: Mood normal          Behavior: Behavior normal          Medical decision making note:   Due to patient already have been taking several doses of amoxicillin, rapid strep and therefore throat culture will be unreliable  Due to moderate suspicion for strep throat in the 1st place, will continue antibiotics    Patient follow-up with ear nose and throat if symptoms recur or persist

## 2022-05-22 NOTE — PATIENT INSTRUCTIONS
1  Over-the-counter ibuprofen and/or acetaminophen as needed for pain or fever  2  Gargle salt water as needed for sore throat pain relief  3  Increase oral fluids  4  Observe a liquid and/or soft diet until symptoms improve  5  Go to the ER immediately for any worsening symptoms, inability to swallow, shortness of breath, or any other ominous symptoms  6  Follow-up with ear nose and throat for any persistent or recurrent symptoms

## 2022-06-03 ENCOUNTER — OFFICE VISIT (OUTPATIENT)
Dept: URGENT CARE | Facility: MEDICAL CENTER | Age: 22
End: 2022-06-03
Payer: COMMERCIAL

## 2022-06-03 VITALS
RESPIRATION RATE: 18 BRPM | BODY MASS INDEX: 30.51 KG/M2 | HEART RATE: 106 BPM | TEMPERATURE: 98.4 F | HEIGHT: 69 IN | WEIGHT: 206 LBS | OXYGEN SATURATION: 100 %

## 2022-06-03 DIAGNOSIS — B34.9 ACUTE VIRAL SYNDROME: Primary | ICD-10-CM

## 2022-06-03 PROCEDURE — U0005 INFEC AGEN DETEC AMPLI PROBE: HCPCS | Performed by: PHYSICIAN ASSISTANT

## 2022-06-03 PROCEDURE — 99213 OFFICE O/P EST LOW 20 MIN: CPT | Performed by: PHYSICIAN ASSISTANT

## 2022-06-03 PROCEDURE — U0003 INFECTIOUS AGENT DETECTION BY NUCLEIC ACID (DNA OR RNA); SEVERE ACUTE RESPIRATORY SYNDROME CORONAVIRUS 2 (SARS-COV-2) (CORONAVIRUS DISEASE [COVID-19]), AMPLIFIED PROBE TECHNIQUE, MAKING USE OF HIGH THROUGHPUT TECHNOLOGIES AS DESCRIBED BY CMS-2020-01-R: HCPCS | Performed by: PHYSICIAN ASSISTANT

## 2022-06-03 RX ORDER — BENZONATATE 200 MG/1
200 CAPSULE ORAL 3 TIMES DAILY PRN
Qty: 20 CAPSULE | Refills: 0 | Status: SHIPPED | OUTPATIENT
Start: 2022-06-03

## 2022-06-03 NOTE — PROGRESS NOTES
[facility] [facility]        NAME: Tara Walter is a 22 y o  female  : 2000    MRN: [id number]  DATE: Shelli 3, 2022  TIME: 4:30 PM    Assessment and Plan   Acute viral syndrome [B34 9]  1  Acute viral syndrome  COVID Only -Office Collect    benzonatate (TESSALON) 200 MG capsule         Patient Instructions   1  Over-the-counter ibuprofen and/or acetaminophen as needed for pain or fever  2  Oxymetazoline nasal spray 2 sprays in each nostril every 12 hours for no more than the next 5 days as needed for nasal congestion  3  Over-the-counter guaifenesin as needed for mucus relief  4  Gargle salt water as needed for sore throat relief  5  Increase oral fluid consumption  6  Follow-up with primary care provider in 7 days for any persistent symptoms  7  Quarantine pending the results of your COVID test           Chief Complaint     Chief Complaint   Patient presents with    Cold Like Symptoms     Pt  C/O cough, congestion, and body aches for the past 3 days  History of Present Illness       64-year-old female patient with a 2 day history of chills, body aches, fatigue, congestion, cough  Patient denies shortness of breath or chest pain  No GI symptoms  She is fully COVID vaccinated and not yet due for her booster  Review of Systems   Review of Systems   Constitutional: Positive for chills and fatigue  Negative for fever  HENT: Positive for congestion, rhinorrhea and sinus pressure  Negative for facial swelling, sinus pain and sore throat  Respiratory: Positive for cough  Cardiovascular: Negative for chest pain  Gastrointestinal: Negative for abdominal pain  Musculoskeletal: Positive for myalgias  Skin: Negative for rash           Current Medications       Current Outpatient Medications:     benzonatate (TESSALON) 200 MG capsule, Take 1 capsule (200 mg total) by mouth 3 (three) times a day as needed for cough, Disp: 20 capsule, Rfl: 0    Ferrous Sulfate (IRON) 325 (65 Fe) MG TABS, Take 1 tablet by mouth daily , Disp: , Rfl:     ibuprofen (MOTRIN) 600 mg tablet, TAKE 1 TABLET BY MOUTH EVERY 4 TO 6 HOURS OR AS NEEDED, Disp: , Rfl:     Loratadine (CLARITIN PO), Take by mouth daily , Disp: , Rfl:     medroxyPROGESTERone (DEPO-PROVERA) 150 mg/mL injection, Inject 1 mL (150 mg total) into a muscle every 3 (three) months, Disp: 1 mL, Rfl: 2    promethazine (PHENERGAN) 25 mg tablet, Take 1/2 - 1 tab po qd-tid prn anxiety, Disp: 90 tablet, Rfl: 0    benztropine (COGENTIN) 1 mg tablet, , Disp: , Rfl:     ondansetron (Zofran ODT) 4 mg disintegrating tablet, Take 1 tablet (4 mg total) by mouth every 8 (eight) hours as needed for nausea or vomiting for up to 2 days, Disp: 5 tablet, Rfl: 0    OXcarbazepine (Trileptal) 150 mg tablet, Take 1 tablet (150 mg total) by mouth daily at bedtime, Disp: 30 tablet, Rfl: 1    ziprasidone (GEODON) 20 mg capsule, , Disp: , Rfl:     Current Facility-Administered Medications:     medroxyPROGESTERone (DEPO-PROVERA) IM injection 150 mg, 150 mg, Intramuscular, Q3 Months, [facility] [place], CRNP, 150 mg at 02/17/22 1412    medroxyPROGESTERone (DEPO-PROVERA) IM injection 150 mg, 150 mg, Intramuscular, Q3 Months, [facility] [place], CRNP, 150 mg at 05/05/22 1406    Current Allergies     Allergies as of 06/03/2022    (No Known Allergies)            The following portions of the patient's history were reviewed and updated as appropriate: allergies, current medications, past family history, past medical history, past social history, past surgical history and problem list      Past Medical History:   Diagnosis Date    Allergic     pollen; animal dander    Anemia     Anxiety     Asthma     Closed left ankle fracture     Depression     Dysmenorrhea     Influenza A 2/28/2020    Insomnia     Moderate single current episode of major depressive disorder ([facility] [address] 1/5/2017       Past Surgical History:   Procedure Laterality Date    ANKLE SURGERY Left 2014    two screws       Family History   Problem Relation Age of Onset    Bipolar disorder Mother         Fentanyl per Pt    Drug abuse Mother     No Known Problems Father     Anxiety disorder Sister     Bipolar disorder Sister     Leukemia Paternal Aunt     Autism spectrum disorder Cousin     Bipolar disorder Half-Sister     Breast cancer Neg Hx     Ovarian cancer Neg Hx          Medications have been verified  Objective   Pulse (!) 106   Temp 98 4 °F (36 9 °C)   Resp 18   Ht 5' 9" (1 753 m)   Wt 93 4 kg (206 lb)   SpO2 100%   BMI 30 42 kg/m²        Physical Exam     Physical Exam  Vitals and nursing note reviewed  Constitutional:       General: She is not in acute distress  Appearance: Normal appearance  She is well-developed  She is not ill-appearing or toxic-appearing  HENT:      Head: Normocephalic  Right Ear: Tympanic membrane normal       Left Ear: Tympanic membrane normal       Nose: Congestion present  Mouth/Throat:      Mouth: Mucous membranes are moist       Pharynx: Posterior oropharyngeal erythema present  No pharyngeal swelling  Cardiovascular:      Rate and Rhythm: Regular rhythm  Tachycardia present  Pulses: Normal pulses  Pulmonary:      Effort: Pulmonary effort is normal       Breath sounds: Normal breath sounds  Abdominal:      Tenderness: There is no abdominal tenderness  Musculoskeletal:         General: Normal range of motion  Cervical back: Normal range of motion  Skin:     General: Skin is warm and dry  Capillary Refill: Capillary refill takes less than 2 seconds  Neurological:      General: No focal deficit present  Mental Status: She is alert and oriented to person, place, and time     Psychiatric:         Mood and Affect: Mood normal          Behavior: Behavior normal

## 2022-06-03 NOTE — LETTER
Shelli 3, 2022     Patient: Tom Eddy   YOB: 2000   Date of Visit: 6/3/2022       To Whom it May Concern:    Tom Eddy was seen in my clinic on 6/3/2022  She is to be excused from work pending the results of her COVID test     If you have any questions or concerns, please don't hesitate to call           Sincerely,          Willie Valencia PA-C        CC: Tom Eddy

## 2022-06-04 LAB — SARS-COV-2 RNA RESP QL NAA+PROBE: NEGATIVE
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
## 2022-06-09 ENCOUNTER — TELEMEDICINE (OUTPATIENT)
Dept: PSYCHIATRY | Facility: CLINIC | Age: 22
End: 2022-06-09
Payer: COMMERCIAL

## 2022-06-09 ENCOUNTER — TELEPHONE (OUTPATIENT)
Dept: PSYCHIATRY | Facility: CLINIC | Age: 22
End: 2022-06-09

## 2022-06-09 DIAGNOSIS — F41.1 GENERALIZED ANXIETY DISORDER: Primary | ICD-10-CM

## 2022-06-09 DIAGNOSIS — F41.0 PANIC ATTACKS: ICD-10-CM

## 2022-06-09 DIAGNOSIS — G47.09 OTHER INSOMNIA: ICD-10-CM

## 2022-06-09 DIAGNOSIS — F31.81 BIPOLAR II DISORDER (HCC): ICD-10-CM

## 2022-06-09 PROBLEM — R29.818 EXTRAPYRAMIDAL SYMPTOM: Status: RESOLVED | Noted: 2022-03-24 | Resolved: 2022-06-09

## 2022-06-09 PROCEDURE — 99214 OFFICE O/P EST MOD 30 MIN: CPT | Performed by: PHYSICIAN ASSISTANT

## 2022-06-09 RX ORDER — PROMETHAZINE HYDROCHLORIDE 25 MG/1
TABLET ORAL
Qty: 90 TABLET | Refills: 2 | Status: SHIPPED | OUTPATIENT
Start: 2022-06-09

## 2022-06-09 NOTE — BH TREATMENT PLAN
TREATMENT PLAN (Medication Management Only)        [facility]    Name/Date of Birth/MRN:  Rozina Dawkins 21 y o  2000 MRN: [id number]  Date of Treatment Plan: June 9, 2022  Diagnosis/Diagnoses:   1  Generalized anxiety disorder    2  Bipolar II disorder ([facility] [address] )    3  Panic attacks    4  Other insomnia      Strengths/Personal Resources for Self-Care: "I''m determined; I work hard; I guess I'm caring"  Area/Areas of need (in own words): "I'm doing great honestly, I feel a lot better since I've not been off of the bipolar medicine"  1  Long Term Goal:   "Maintain mood stability and control of anxiety and PTSD"  Target Date: 3-6 months  Person/Persons responsible for completion of goal: Mark Brown and Gladys  2  Short Term Objective (s) - How will we reach this goal?:   A  Provider new recommended medication/dosage changes and/or continue medication(s): Pt is having less anxiety, no change in PTSD, but now believes she has multiple personality disorder  Tx options discussed  I again recommended she try the Oxcarbazepine and she refuses at this time, but will continue Promethazine  I recommended psychotherapy to explore all of Pt's Sxs  Will need more information and details on potential alters, but suspect her Sxs mainly relate to Bipolar 2 D/O, PTSD, anxiety, and personality factors, and not necessarily alters  It is curious that one of her "Alters" is named very similarly to the brand of medical cannabis she takes  I advised her to find another psychotherapist --gave the PsychologyToday  com and [employer] websites for community resources  Treatment plan done and Pt accepts the plan   Treatment Plan done but not signed at time of office visit due to:  Plan reviewed by phone or in person  and verbal consent given due to COVID social distancing  D/C Oxcarbazepine  Continue:  Promethazine 25mg (1/2) tab po qd-tid prn anxiety # 90 R2  Medical Cannabis per outside provider  Return 10 weeks, call sooner prn    Target Date: 3-6 months  Person/Persons Responsible for Completion of Goal: Shaan Velasquez   Progress Towards Goals: stable, continuing treatment  Treatment Modality: Medication mgt  Review due 180 days from date of this plan: 6 months - 12/9/2022  Expected length of service: ongoing treatment unless revised  My Physician/PA/NP and I have developed this plan together and I agree to work on the goals and objectives  I understand the treatment goals that were developed for my treatment    Electronic Signatures: on file (unless signed below)    Mike Zuleta PA-C 06/09/22

## 2022-06-09 NOTE — TELEPHONE ENCOUNTER
Per Santhosh Yoder PA-C's follow up instructions from 6/9/22     " Return 10 weeks, the last appt of the day please, for Medication review "    Please contact patient to schedule follow up appt      Thank you

## 2022-06-15 ENCOUNTER — TELEPHONE (OUTPATIENT)
Dept: PSYCHIATRY | Facility: CLINIC | Age: 22
End: 2022-06-15

## 2022-06-15 NOTE — TELEPHONE ENCOUNTER
Left voicemail for patient to call the office back to schedule 10 week follow up (around 8/16/2022)

## 2022-07-22 ENCOUNTER — OFFICE VISIT (OUTPATIENT)
Dept: OBGYN CLINIC | Facility: MEDICAL CENTER | Age: 22
End: 2022-07-22
Payer: COMMERCIAL

## 2022-07-22 VITALS
SYSTOLIC BLOOD PRESSURE: 130 MMHG | BODY MASS INDEX: 31.25 KG/M2 | HEIGHT: 68 IN | DIASTOLIC BLOOD PRESSURE: 90 MMHG | WEIGHT: 206.2 LBS

## 2022-07-22 DIAGNOSIS — Z30.42 ENCOUNTER FOR MANAGEMENT AND INJECTION OF DEPO-PROVERA: Primary | ICD-10-CM

## 2022-07-22 PROCEDURE — 96372 THER/PROPH/DIAG INJ SC/IM: CPT

## 2022-07-22 RX ORDER — MEDROXYPROGESTERONE ACETATE 150 MG/ML
150 INJECTION, SUSPENSION INTRAMUSCULAR ONCE
Status: COMPLETED | OUTPATIENT
Start: 2022-07-22 | End: 2022-07-22

## 2022-07-22 RX ADMIN — MEDROXYPROGESTERONE ACETATE 150 MG: 150 INJECTION, SUSPENSION INTRAMUSCULAR at 16:32

## 2022-07-22 NOTE — PROGRESS NOTES
Lab: [facility]    Depo  Last depo: 5/5/22  Window: 7/21-8/4/22  No UPT needed  Amenorrheic on depo  Rt  Buttock  Tolerate well:  Well  Next depo: 12w  Pt  to schedule yearly at checkout  Given By: Marcelina Miller MA

## 2022-08-23 ENCOUNTER — TELEPHONE (OUTPATIENT)
Dept: PSYCHIATRY | Facility: CLINIC | Age: 22
End: 2022-08-23

## 2022-08-23 NOTE — TELEPHONE ENCOUNTER
NO-SHOW LETTER MAILED TO Shaan Almanza    ADDRESS: [id number] [id number]

## 2022-08-29 ENCOUNTER — TELEPHONE (OUTPATIENT)
Dept: PSYCHIATRY | Facility: CLINIC | Age: 22
End: 2022-08-29

## 2022-09-29 DIAGNOSIS — F41.1 GENERALIZED ANXIETY DISORDER: ICD-10-CM

## 2022-09-29 DIAGNOSIS — F41.0 PANIC ATTACKS: ICD-10-CM

## 2022-09-29 RX ORDER — PROMETHAZINE HYDROCHLORIDE 25 MG/1
TABLET ORAL
Qty: 90 TABLET | Refills: 2 | Status: CANCELLED | OUTPATIENT
Start: 2022-09-29

## 2022-09-29 NOTE — TELEPHONE ENCOUNTER
Medication Refill Request     Name of Medication Promethazine  Dose/Frequency 1/2 tablet 25 mg  Quantity 90  Verified pharmacy   [x]  Verified ordering Provider   [x]  Does patient have enough for the next 3 days? Yes [x] No []  Does patient have a follow-up appointment scheduled?  Yes [x] No []   If so when is appointment: 10/06

## 2022-09-29 NOTE — TELEPHONE ENCOUNTER
Writer confirmed with pharmacy and will have medication filled      Writer called pt and informed of refill availability

## 2022-09-29 NOTE — TELEPHONE ENCOUNTER
Thank you For doing that! I sent that as a reminder to myself to call pharmacy  I do appreciate your help! Thanks again!

## 2022-10-05 ENCOUNTER — TELEPHONE (OUTPATIENT)
Dept: PSYCHIATRY | Facility: CLINIC | Age: 22
End: 2022-10-05

## 2022-10-05 NOTE — TELEPHONE ENCOUNTER
Called and lvm for patient to call the office and reschedule appt that was cancelled on 10/6/22 at 430pm due to provider being out of the office  Please schedule patient for next available appt

## 2022-10-12 PROBLEM — S01.81XA LACERATION OF CHIN, INITIAL ENCOUNTER: Status: RESOLVED | Noted: 2021-06-04 | Resolved: 2022-10-12
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
## 2022-10-20 ENCOUNTER — ANNUAL EXAM (OUTPATIENT)
Dept: OBGYN CLINIC | Facility: MEDICAL CENTER | Age: 22
End: 2022-10-20
Payer: COMMERCIAL

## 2022-10-20 VITALS
WEIGHT: 217.6 LBS | SYSTOLIC BLOOD PRESSURE: 110 MMHG | HEIGHT: 68 IN | BODY MASS INDEX: 32.98 KG/M2 | DIASTOLIC BLOOD PRESSURE: 80 MMHG

## 2022-10-20 DIAGNOSIS — Z30.42 ENCOUNTER FOR MANAGEMENT AND INJECTION OF DEPO-PROVERA: ICD-10-CM

## 2022-10-20 DIAGNOSIS — Z01.419 WELL WOMAN EXAM WITH ROUTINE GYNECOLOGICAL EXAM: Primary | ICD-10-CM

## 2022-10-20 PROCEDURE — 99395 PREV VISIT EST AGE 18-39: CPT | Performed by: PHYSICIAN ASSISTANT

## 2022-10-20 PROCEDURE — G0145 SCR C/V CYTO,THINLAYER,RESCR: HCPCS | Performed by: PHYSICIAN ASSISTANT

## 2022-10-20 RX ADMIN — MEDROXYPROGESTERONE ACETATE 150 MG: 150 INJECTION, SUSPENSION INTRAMUSCULAR at 08:37
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
## 2022-10-25 RX ORDER — MEDROXYPROGESTERONE ACETATE 150 MG/ML
150 INJECTION, SUSPENSION INTRAMUSCULAR ONCE
Status: COMPLETED | OUTPATIENT
Start: 2022-10-25 | End: 2022-10-20

## 2022-10-28 LAB
LAB AP GYN PRIMARY INTERPRETATION: NORMAL
Lab: NORMAL

## 2022-12-27 ENCOUNTER — TELEPHONE (OUTPATIENT)
Dept: FAMILY MEDICINE CLINIC | Facility: MEDICAL CENTER | Age: 22
End: 2022-12-27

## 2022-12-27 NOTE — TELEPHONE ENCOUNTER
Pt called to reschedule the physical appt she missed today  She has had a cold and has been sleeping a lot, so missed her appt  Pt said she has had a stuffy nose, congestion, and sneezing  Pt said she does not trust the at-home covid tests, so would prefer to have PCR test done to check for covid  Scheduled covid swab for Thursday at her request   Will reschedule her physical afterwards depending on the result of the covid swab  Pt is vaccinated

## 2022-12-29 ENCOUNTER — TELEPHONE (OUTPATIENT)
Dept: FAMILY MEDICINE CLINIC | Facility: MEDICAL CENTER | Age: 22
End: 2022-12-29

## 2022-12-29 ENCOUNTER — APPOINTMENT (EMERGENCY)
Dept: RADIOLOGY | Facility: HOSPITAL | Age: 22
End: 2022-12-29

## 2022-12-29 ENCOUNTER — HOSPITAL ENCOUNTER (EMERGENCY)
Facility: HOSPITAL | Age: 22
Discharge: HOME | End: 2022-12-29
Attending: EMERGENCY MEDICINE

## 2022-12-29 VITALS
HEART RATE: 97 BPM | DIASTOLIC BLOOD PRESSURE: 81 MMHG | BODY MASS INDEX: 33.42 KG/M2 | WEIGHT: 219.8 LBS | SYSTOLIC BLOOD PRESSURE: 127 MMHG | RESPIRATION RATE: 18 BRPM | OXYGEN SATURATION: 95 % | TEMPERATURE: 98.7 F

## 2022-12-29 DIAGNOSIS — U07.1 COVID-19: Primary | ICD-10-CM

## 2022-12-29 LAB
ALBUMIN SERPL BCP-MCNC: 4.3 G/DL (ref 3.5–5)
ALP SERPL-CCNC: 46 U/L (ref 34–104)
ALT SERPL W P-5'-P-CCNC: 13 U/L (ref 7–52)
ANION GAP SERPL CALCULATED.3IONS-SCNC: 8 MMOL/L (ref 4–13)
AST SERPL W P-5'-P-CCNC: 11 U/L (ref 13–39)
BASOPHILS # BLD AUTO: 0.01 THOUSANDS/ÂΜL (ref 0–0.1)
BASOPHILS NFR BLD AUTO: 0 % (ref 0–1)
BILIRUB SERPL-MCNC: 0.41 MG/DL (ref 0.2–1)
BUN SERPL-MCNC: 12 MG/DL (ref 5–25)
CALCIUM SERPL-MCNC: 9.4 MG/DL (ref 8.4–10.2)
CHLORIDE SERPL-SCNC: 105 MMOL/L (ref 96–108)
CK SERPL-CCNC: 40 U/L (ref 26–192)
CO2 SERPL-SCNC: 24 MMOL/L (ref 21–32)
CREAT SERPL-MCNC: 0.52 MG/DL (ref 0.6–1.3)
EOSINOPHIL # BLD AUTO: 0.07 THOUSAND/ÂΜL (ref 0–0.61)
EOSINOPHIL NFR BLD AUTO: 1 % (ref 0–6)
ERYTHROCYTE [DISTWIDTH] IN BLOOD BY AUTOMATED COUNT: 12.6 % (ref 11.6–15.1)
FLUAV RNA RESP QL NAA+PROBE: NEGATIVE
FLUBV RNA RESP QL NAA+PROBE: NEGATIVE
GFR SERPL CREATININE-BSD FRML MDRD: 137 ML/MIN/1.73SQ M
GLUCOSE SERPL-MCNC: 97 MG/DL (ref 65–140)
HCT VFR BLD AUTO: 38.3 % (ref 34.8–46.1)
HGB BLD-MCNC: 12.9 G/DL (ref 11.5–15.4)
IMM GRANULOCYTES # BLD AUTO: 0.04 THOUSAND/UL (ref 0–0.2)
IMM GRANULOCYTES NFR BLD AUTO: 1 % (ref 0–2)
LYMPHOCYTES # BLD AUTO: 0.19 THOUSANDS/ÂΜL (ref 0.6–4.47)
LYMPHOCYTES NFR BLD AUTO: 3 % (ref 14–44)
MAGNESIUM SERPL-MCNC: 1.8 MG/DL (ref 1.9–2.7)
MCH RBC QN AUTO: 28.5 PG (ref 26.8–34.3)
MCHC RBC AUTO-ENTMCNC: 33.7 G/DL (ref 31.4–37.4)
MCV RBC AUTO: 85 FL (ref 82–98)
MONOCYTES # BLD AUTO: 0.43 THOUSAND/ÂΜL (ref 0.17–1.22)
MONOCYTES NFR BLD AUTO: 8 % (ref 4–12)
NEUTROPHILS # BLD AUTO: 4.78 THOUSANDS/ÂΜL (ref 1.85–7.62)
NEUTS SEG NFR BLD AUTO: 87 % (ref 43–75)
NRBC BLD AUTO-RTO: 0 /100 WBCS
PLATELET # BLD AUTO: 231 THOUSANDS/UL (ref 149–390)
PMV BLD AUTO: 9.7 FL (ref 8.9–12.7)
POTASSIUM SERPL-SCNC: 3.6 MMOL/L (ref 3.5–5.3)
PROT SERPL-MCNC: 7 G/DL (ref 6.4–8.4)
RBC # BLD AUTO: 4.52 MILLION/UL (ref 3.81–5.12)
RSV RNA RESP QL NAA+PROBE: NEGATIVE
SARS-COV-2 RNA RESP QL NAA+PROBE: POSITIVE
SODIUM SERPL-SCNC: 137 MMOL/L (ref 135–147)
WBC # BLD AUTO: 5.52 THOUSAND/UL (ref 4.31–10.16)

## 2022-12-29 RX ORDER — ONDANSETRON 2 MG/ML
4 INJECTION INTRAMUSCULAR; INTRAVENOUS ONCE
Status: CANCELLED | OUTPATIENT
Start: 2022-12-29 | End: 2022-12-29

## 2022-12-29 RX ORDER — NAPROXEN 500 MG/1
500 TABLET ORAL 2 TIMES DAILY WITH MEALS
Qty: 30 TABLET | Refills: 0 | Status: SHIPPED | OUTPATIENT
Start: 2022-12-29 | End: 2023-01-03

## 2022-12-29 RX ORDER — KETOROLAC TROMETHAMINE 30 MG/ML
15 INJECTION, SOLUTION INTRAMUSCULAR; INTRAVENOUS ONCE
Status: COMPLETED | OUTPATIENT
Start: 2022-12-29 | End: 2022-12-29

## 2022-12-29 RX ORDER — ONDANSETRON 4 MG/1
4 TABLET, FILM COATED ORAL EVERY 6 HOURS
Qty: 12 TABLET | Refills: 0 | Status: SHIPPED | OUTPATIENT
Start: 2022-12-29

## 2022-12-29 RX ORDER — ACETAMINOPHEN 325 MG/1
975 TABLET ORAL ONCE
Status: COMPLETED | OUTPATIENT
Start: 2022-12-29 | End: 2022-12-29

## 2022-12-29 RX ORDER — ACETAMINOPHEN 325 MG/1
975 TABLET ORAL 3 TIMES DAILY PRN
Qty: 30 TABLET | Refills: 0 | Status: SHIPPED | OUTPATIENT
Start: 2022-12-29

## 2022-12-29 RX ORDER — ONDANSETRON 2 MG/ML
4 INJECTION INTRAMUSCULAR; INTRAVENOUS ONCE
Status: COMPLETED | OUTPATIENT
Start: 2022-12-29 | End: 2022-12-29

## 2022-12-29 RX ADMIN — ACETAMINOPHEN 975 MG: 325 TABLET, FILM COATED ORAL at 06:31

## 2022-12-29 RX ADMIN — ONDANSETRON 4 MG: 2 INJECTION INTRAMUSCULAR; INTRAVENOUS at 06:09

## 2022-12-29 RX ADMIN — SODIUM CHLORIDE 1000 ML: 0.9 INJECTION, SOLUTION INTRAVENOUS at 06:09

## 2022-12-29 RX ADMIN — KETOROLAC TROMETHAMINE 15 MG: 30 INJECTION, SOLUTION INTRAMUSCULAR; INTRAVENOUS at 06:13

## 2022-12-29 NOTE — TELEPHONE ENCOUNTER
Triaged- patient states she is feeling a little better after getting some sleep  She will continue to monitor her symptoms  Follow the ER's instructions    Aware to call if any questions

## 2022-12-29 NOTE — Clinical Note
Peri Steven was seen and treated in our emergency department on 12/29/2022  Diagnosis:     Ashley Hernandez  is off the rest of the shift today, may return to work on return date  She may return on this date: 01/02/2023    When fever free for 24 hours without use of Tylenol and Naprosyn  If you have any questions or concerns, please don't hesitate to call        Crispin Burgos    ______________________________           _______________          _______________  Hospital Representative                              Date                                Time

## 2022-12-29 NOTE — DISCHARGE INSTRUCTIONS
Take the prescribed Naprosyn 500 mg twice daily, once in the morning with breakfast, and again at night with dinner  Use the prescribed out of 75 mg of Tylenol up to 3 times daily for breakthrough pain  Use the prescribed Zofran as needed for nausea and vomiting  Take small sips of fluids such as Pedialyte and Gatorade to maintain your hydration  Return to ER if persistent fever, vomiting, inability tolerate fluids, difficulty breathing, severe pain, and weakness

## 2022-12-29 NOTE — ED PROVIDER NOTES
History  Chief Complaint   Patient presents with   • Flu Symptoms     Pt comes in with cough, sore throat, runny nose, epigastric pain, nausea, headache  Denies SOB/Diarrhea/Vomiting  Recent flu exposure  Last tylenol at 2200 last night and Nyquil around 0100 this morning  Hx of anxiety and drug abuse  Patient is a 21-year-old female with no significant PMH presenting for evaluation of 3 days of "seasonal allergy symptoms" and 1 day of flulike symptoms  Patient notes that for the past 3 days she has had rhinorrhea and nasal itching but believes it to be seasonal allergy symptoms  This evening, patient notes developing chills, body aches, generalized headache, sore throat, and scratchy throat  She notes her roommate had the flu last week  She also notes that one of her roommates saw her mother over [place] who had COVID  She has not checked her temperature at home but endorses subjective fevers  She notes a generalized headache but denies vision changes  She notes a sore throat and painful swallowing but denies difficulty swallowing  She endorses nausea but denies abdominal pain vomiting and diarrhea  She denies chest pain, shortness of breath, skin changes, leg swelling  History provided by:  Patient   used: No        Prior to Admission Medications   Prescriptions Last Dose Informant Patient Reported? Taking?    Ascorbic Acid (VITAMIN C PO)   Yes No   Sig: Take by mouth   Ferrous Sulfate (IRON) 325 (65 Fe) MG TABS   Yes No   Sig: Take 1 tablet by mouth daily    Loratadine (CLARITIN PO)   Yes No   Sig: Take by mouth daily    VITAMIN D PO   Yes No   Sig: Take by mouth   benzonatate (TESSALON) 200 MG capsule   No No   Sig: Take 1 capsule (200 mg total) by mouth 3 (three) times a day as needed for cough   Patient not taking: No sig reported   benztropine (COGENTIN) 1 mg tablet   Yes No   Patient not taking: No sig reported   ibuprofen (MOTRIN) 600 mg tablet   Yes No   Sig: TAKE 1 TABLET BY MOUTH EVERY 4 TO 6 HOURS OR AS NEEDED   medroxyPROGESTERone (DEPO-PROVERA) 150 mg/mL injection   No No   Sig: Inject 1 mL (150 mg total) into a muscle every 3 (three) months   ondansetron (Zofran ODT) 4 mg disintegrating tablet   No No   Sig: Take 1 tablet (4 mg total) by mouth every 8 (eight) hours as needed for nausea or vomiting for up to 2 days   promethazine (PHENERGAN) 25 mg tablet   No No   Sig: Take 1/2 - 1 tab po qd-tid prn anxiety   ziprasidone (GEODON) 20 mg capsule   Yes No   Patient not taking: No sig reported      Facility-Administered Medications Last Administration Doses Remaining   medroxyPROGESTERone (DEPO-PROVERA) IM injection 150 mg 2/17/2022  2:12 PM    medroxyPROGESTERone (DEPO-PROVERA) IM injection 150 mg 5/5/2022  2:06 PM           Past Medical History:   Diagnosis Date   • Allergic     pollen; animal dander   • Anemia    • Anxiety    • Asthma    • Closed left ankle fracture    • Depression    • Dysmenorrhea    • Influenza A 2/28/2020   • Insomnia    • Moderate single current episode of major depressive disorder ([facility] [address] ) 1/5/2017       Past Surgical History:   Procedure Laterality Date   • ANKLE SURGERY Left 2014    two screws       Family History   Problem Relation Age of Onset   • Bipolar disorder Mother         Fentanyl per Pt   • Drug abuse Mother    • No Known Problems Father    • Anxiety disorder Sister    • Bipolar disorder Sister    • Leukemia Paternal Aunt    • Autism spectrum disorder Cousin    • Bipolar disorder Half-Sister    • Breast cancer Neg Hx    • Ovarian cancer Neg Hx      I have reviewed and agree with the history as documented      E-Cigarette/Vaping   • E-Cigarette Use Current Every Day User      E-Cigarette/Vaping Substances   • Nicotine Yes    • THC No    • CBD No    • Flavoring No    • Other No    • Unknown No      Social History     Tobacco Use   • Smoking status: Former     Packs/day: 0 50     Years: 7 00     Pack years: 3 50     Types: Cigarettes     Quit date: 2021     Years since quittin 9   • Smokeless tobacco: Never   Vaping Use   • Vaping Use: Every day   • Substances: Nicotine   Substance Use Topics   • Alcohol use: Not Currently     Comment: 1 glass of wine approx 1-2x weekly  Pt denies h/o abuse   • Drug use: Yes     Types: Marijuana     Comment: medical marijuana       Review of Systems   Constitutional: Positive for appetite change (decreased), chills and fever  HENT: Positive for rhinorrhea and sore throat  Negative for congestion, ear pain and trouble swallowing  Eyes: Negative for pain and visual disturbance  Respiratory: Positive for cough  Negative for shortness of breath  Cardiovascular: Negative for chest pain  Gastrointestinal: Positive for nausea  Negative for abdominal pain, diarrhea and vomiting  Genitourinary: Negative  Musculoskeletal: Positive for myalgias  Negative for back pain and gait problem  Allergic/Immunologic: Negative for immunocompromised state  Neurological: Positive for weakness, light-headedness and headaches  Negative for dizziness and syncope  All other systems reviewed and are negative  Physical Exam  Physical Exam  Vitals and nursing note reviewed  Constitutional:       General: She is awake  She is not in acute distress  Appearance: Normal appearance  She is well-developed  She is obese  She is ill-appearing  She is not toxic-appearing or diaphoretic  HENT:      Head: Normocephalic and atraumatic  Right Ear: Ear canal and external ear normal  Tympanic membrane is erythematous  Tympanic membrane is not bulging  Left Ear: Ear canal and external ear normal  Tympanic membrane is erythematous  Tympanic membrane is not bulging  Nose: Congestion present  Right Turbinates: Enlarged  Left Turbinates: Enlarged  Mouth/Throat:      Lips: Pink  No lesions  Mouth: Mucous membranes are moist       Pharynx: Oropharynx is clear  Uvula midline   Posterior oropharyngeal erythema present  No oropharyngeal exudate  Tonsils: No tonsillar exudate or tonsillar abscesses  0 on the right  0 on the left  Eyes:      General: Lids are normal  Vision grossly intact  Gaze aligned appropriately  Extraocular Movements: Extraocular movements intact  Conjunctiva/sclera: Conjunctivae normal    Neck:      Trachea: Trachea and phonation normal  No abnormal tracheal secretions  Cardiovascular:      Rate and Rhythm: Tachycardia present  Pulses:           Radial pulses are 2+ on the right side and 2+ on the left side  Dorsalis pedis pulses are 2+ on the right side and 2+ on the left side  Posterior tibial pulses are 2+ on the right side and 2+ on the left side  Heart sounds: Normal heart sounds, S1 normal and S2 normal  No murmur heard  Pulmonary:      Effort: Pulmonary effort is normal  No tachypnea or respiratory distress  Breath sounds: Normal breath sounds and air entry  No stridor, decreased air movement or transmitted upper airway sounds  No decreased breath sounds  Chest:      Chest wall: No tenderness  Abdominal:      General: There is no distension  Palpations: Abdomen is soft  Tenderness: There is no abdominal tenderness  There is no guarding or rebound  Musculoskeletal:         General: Normal range of motion  Cervical back: Normal range of motion and neck supple  Right lower leg: No edema  Left lower leg: No edema  Comments: TAVARES, 5/5 strength throughout, sensation intact, no focal joint swelling  Ambulatory with steady gait  Skin:     General: Skin is warm and dry  Capillary Refill: Capillary refill takes less than 2 seconds  Findings: No rash or wound  Neurological:      General: No focal deficit present  Mental Status: She is alert and oriented to person, place, and time  Mental status is at baseline  GCS: GCS eye subscore is 4  GCS verbal subscore is 5   GCS motor subscore is 6  Psychiatric:         Behavior: Behavior is cooperative  Vital Signs  ED Triage Vitals   Temperature Pulse Respirations Blood Pressure SpO2   12/29/22 0516 12/29/22 0517 12/29/22 0517 12/29/22 0517 12/29/22 0517   98 7 °F (37 1 °C) (!) 116 20 136/87 98 %      Temp Source Heart Rate Source Patient Position - Orthostatic VS BP Location FiO2 (%)   12/29/22 0516 12/29/22 0517 12/29/22 0657 12/29/22 0517 --   Oral Monitor Lying Right arm       Pain Score       12/29/22 0517       8           Vitals:    12/29/22 0517 12/29/22 0630 12/29/22 0657   BP: 136/87  127/81   Pulse: (!) 116 102 97   Patient Position - Orthostatic VS:   Lying         Visual Acuity      ED Medications  Medications   sodium chloride 0 9 % bolus 1,000 mL (0 mL Intravenous Stopped 12/29/22 0745)   ondansetron (ZOFRAN) injection 4 mg (4 mg Intravenous Given 12/29/22 1114)   ketorolac (TORADOL) injection 15 mg (15 mg Intravenous Given 12/29/22 2045)   acetaminophen (TYLENOL) tablet 975 mg (975 mg Oral Given 12/29/22 0631)       Diagnostic Studies  Results Reviewed     Procedure Component Value Units Date/Time    FLU/RSV/COVID - if FLU/RSV clinically relevant [[id number]]  (Abnormal) Collected: 12/29/22 0615    Lab Status: Final result Specimen: Nares from Nose Updated: 12/29/22 0714     SARS-CoV-2 Positive     INFLUENZA A PCR Negative     INFLUENZA B PCR Negative     RSV PCR Negative    Narrative:      FOR PEDIATRIC PATIENTS - copy/paste COVID Guidelines URL to browser: [email or web address]    SARS-CoV-2 assay is a Nucleic Acid Amplification assay intended for the  qualitative detection of nucleic acid from SARS-CoV-2 in nasopharyngeal  swabs  Results are for the presumptive identification of SARS-CoV-2 RNA  Positive results are indicative of infection with SARS-CoV-2, the virus  causing COVID-19, but do not rule out bacterial infection or co-infection  with other viruses  Laboratories within the [facility] and its  territories are required to report all positive results to the appropriate  public health authorities  Negative results do not preclude SARS-CoV-2  infection and should not be used as the sole basis for treatment or other  patient management decisions  Negative results must be combined with  clinical observations, patient history, and epidemiological information  This test has not been FDA cleared or approved  This test has been authorized by FDA under an Emergency Use Authorization  (EUA)  This test is only authorized for the duration of time the  declaration that circumstances exist justifying the authorization of the  emergency use of an in vitro diagnostic tests for detection of SARS-CoV-2  virus and/or diagnosis of COVID-19 infection under section 564(b)(1) of  the Act, 21 U  S C  458EVF-1(R)(5), unless the authorization is terminated  or revoked sooner  The test has been validated but independent review by FDA  and CLIA is pending  Test performed using [employer] GeneXpert: This RT-PCR assay targets N2,  a region unique to SARS-CoV-2  A conserved region in the E-gene was chosen  for pan-Sarbecovirus detection which includes SARS-CoV-2  According to CMS-2020-01-R, this platform meets the definition of high-throughput technology      Comprehensive metabolic panel [[id number]]  (Abnormal) Collected: 12/29/22 0615    Lab Status: Final result Specimen: Blood from Arm, Left Updated: 12/29/22 0647     Sodium 137 mmol/L      Potassium 3 6 mmol/L      Chloride 105 mmol/L      CO2 24 mmol/L      ANION GAP 8 mmol/L      BUN 12 mg/dL      Creatinine 0 52 mg/dL      Glucose 97 mg/dL      Calcium 9 4 mg/dL      AST 11 U/L      ALT 13 U/L      Alkaline Phosphatase 46 U/L      Total Protein 7 0 g/dL      Albumin 4 3 g/dL      Total Bilirubin 0 41 mg/dL      eGFR 137 ml/min/1 73sq m     Narrative:      [place] guidelines for Chronic Kidney Disease (CKD): •  Stage 1 with normal or high GFR (GFR > 90 mL/min/1 73 square meters)  •  Stage 2 Mild CKD (GFR = 60-89 mL/min/1 73 square meters)  •  Stage 3A Moderate CKD (GFR = 45-59 mL/min/1 73 square meters)  •  Stage 3B Moderate CKD (GFR = 30-44 mL/min/1 73 square meters)  •  Stage 4 Severe CKD (GFR = 15-29 mL/min/1 73 square meters)  •  Stage 5 End Stage CKD (GFR <15 mL/min/1 73 square meters)  Note: GFR calculation is accurate only with a steady state creatinine    CK Total with Reflex CKMB [[id number]]  (Normal) Collected: 12/29/22 0615    Lab Status: Final result Specimen: Blood from Arm, Left Updated: 12/29/22 0647     Total CK 40 U/L     Magnesium [[id number]]  (Abnormal) Collected: 12/29/22 0615    Lab Status: Final result Specimen: Blood from Arm, Left Updated: 12/29/22 0647     Magnesium 1 8 mg/dL     CBC and differential [[id number]]  (Abnormal) Collected: 12/29/22 0615    Lab Status: Final result Specimen: Blood from Arm, Left Updated: 12/29/22 0630     WBC 5 52 Thousand/uL      RBC 4 52 Million/uL      Hemoglobin 12 9 g/dL      Hematocrit 38 3 %      MCV 85 fL      MCH 28 5 pg      MCHC 33 7 g/dL      RDW 12 6 %      MPV 9 7 fL      Platelets 730 Thousands/uL      nRBC 0 /100 WBCs      Neutrophils Relative 87 %      Immat GRANS % 1 %      Lymphocytes Relative 3 %      Monocytes Relative 8 %      Eosinophils Relative 1 %      Basophils Relative 0 %      Neutrophils Absolute 4 78 Thousands/µL      Immature Grans Absolute 0 04 Thousand/uL      Lymphocytes Absolute 0 19 Thousands/µL      Monocytes Absolute 0 43 Thousand/µL      Eosinophils Absolute 0 07 Thousand/µL      Basophils Absolute 0 01 Thousands/µL                  XR chest 1 view portable   ED Interpretation by Ted Soto (12/29 2563)   No acute cardiopulmonary disease identified                   Procedures  Procedures         ED Course  ED Course as of 12/29/22 0813   Thu Dec 29, 2022   0512 CBC and differential(!)  No leukocytosis making acute bacterial infectious process less likely, no gross anemia   0656 Comprehensive metabolic panel(!)  No metabolic abnormality, no YULISA, no transaminitis   0656 CK Total with Reflex CKMB  Within defined limits   0715 SARS-COV-2(!): Positive  Likely source of pt's sx   0725 I discussed for second time with the patient and her blood work and chest x-ray result  I shared that she is COVID-positive and plan will be for supportive care at home  She notes a vast improvement in her headache and overall body aches since medication therapy  Plan for discharged home with close follow-up with primary care provider, Tylenol and Naprosyn use, Zofran, and strict return precautions  Patient agreeable plan has no further questions at this time  Patient is with improved appearance, stable vital signs, in no acute distress, nontoxic, and ambulatory steady gait at time of discharge  SBIRT 22yo+    Flowsheet Row Most Recent Value   SBIRT (23 yo +)    In order to provide better care to our patients, we are screening all of our patients for alcohol and drug use  Would it be okay to ask you these screening questions? Yes Filed at: 12/29/2022 3956   Initial Alcohol Screen: US AUDIT-C     1  How often do you have a drink containing alcohol? 0 Filed at: 12/29/2022 0528   2  How many drinks containing alcohol do you have on a typical day you are drinking? 0 Filed at: 12/29/2022 0528   3a  Male UNDER 65: How often do you have five or more drinks on one occasion? 0 Filed at: 12/29/2022 0528   3b  FEMALE Any Age, or MALE 65+: How often do you have 4 or more drinks on one occassion? 0 Filed at: 12/29/2022 0528   Audit-C Score 0 Filed at: 12/29/2022 3975   SHANTAL: How many times in the past year have you    Used an illegal drug or used a prescription medication for non-medical reasons?  Never Filed at: 12/29/2022 0815                    MDM  Number of Diagnoses or Management Options  COVID-19: new and requires workup  Diagnosis management comments: DDx including but not limited to: URI, bronchitis, pneumonia, viral illness, COVID 23, Flu A, Flu B, RSV       Amount and/or Complexity of Data Reviewed  Clinical lab tests: ordered and reviewed  Tests in the radiology section of CPT®: ordered and reviewed  Decide to obtain previous medical records or to obtain history from someone other than the patient: yes  Review and summarize past medical records: yes  Independent visualization of images, tracings, or specimens: yes    Risk of Complications, Morbidity, and/or Mortality  General comments: See ED course for MDM and disposition discussion         Disposition  Final diagnoses:   COVID-19     Time reflects when diagnosis was documented in both MDM as applicable and the Disposition within this note     Time User Action Codes Description Comment    12/29/2022  7:15 AM Clark Hylton Add [U07 1] COVID-19       ED Disposition     ED Disposition   Discharge    Condition   Stable    Date/Time   Thu Dec 29, 2022  7:15 AM    Comment   Libia Avila discharge to home/self care                 Follow-up Information     Follow up With Specialties Details Why Contact Info Additional [address] [facility]  Nurse Practitioner Schedule an appointment as soon as possible for a visit in 3 days  [address] [place] [address] Emergency Department Emergency Medicine Go to  If symptoms worsen [address] [facility] [address] [facility] Emergency Department, [address], [place], [place], [address]          Discharge Medication List as of 12/29/2022  7:18 AM      START taking these medications    Details   acetaminophen (TYLENOL) 325 mg tablet Take 3 tablets (975 mg total) by mouth 3 (three) times a day as needed for mild pain, Starting Thu 12/29/2022, Normal      naproxen (Naprosyn) 500 mg tablet Take 1 tablet (500 mg total) by mouth 2 (two) times a day with meals for 5 days, Starting Thu 12/29/2022, Until Tue 1/3/2023, Normal      ondansetron (ZOFRAN) 4 mg tablet Take 1 tablet (4 mg total) by mouth every 6 (six) hours, Starting Thu 12/29/2022, Normal         CONTINUE these medications which have NOT CHANGED    Details   Ascorbic Acid (VITAMIN C PO) Take by mouth, Historical Med      benzonatate (TESSALON) 200 MG capsule Take 1 capsule (200 mg total) by mouth 3 (three) times a day as needed for cough, Starting Fri 6/3/2022, Normal      benztropine (COGENTIN) 1 mg tablet Starting u 4/21/2022, Historical Med      Ferrous Sulfate (IRON) 325 (65 Fe) MG TABS Take 1 tablet by mouth daily , Historical Med      ibuprofen (MOTRIN) 600 mg tablet TAKE 1 TABLET BY MOUTH EVERY 4 TO 6 HOURS OR AS NEEDED, Historical Med      Loratadine (CLARITIN PO) Take by mouth daily , Historical Med      medroxyPROGESTERone (DEPO-PROVERA) 150 mg/mL injection Inject 1 mL (150 mg total) into a muscle every 3 (three) months, Starting Thu 5/5/2022, Normal      ondansetron (Zofran ODT) 4 mg disintegrating tablet Take 1 tablet (4 mg total) by mouth every 8 (eight) hours as needed for nausea or vomiting for up to 2 days, Starting Mon 12/27/2021, Until Wed 12/29/2021 at 2359, Normal      promethazine (PHENERGAN) 25 mg tablet Take 1/2 - 1 tab po qd-tid prn anxiety, Normal      VITAMIN D PO Take by mouth, Historical Med      ziprasidone (GEODON) 20 mg capsule Starting Thu 4/21/2022, Historical Med             No discharge procedures on file      PDMP Review       Value Time User    PDMP Reviewed  Yes 12/29/2022  5:14 AM Yong Baker MD          ED Provider  Electronically Signed by           Leonie Litten, CRNP  12/29/22 9867

## 2022-12-29 NOTE — TELEPHONE ENCOUNTER
Pt went to the ER this morning and she was diagnosed with Covid  Pt was given Zofran and it is not working for her  She said an hour after she left the hospital she started feeling sick again  She took another pill at 11am  Please, advise

## 2023-01-05 ENCOUNTER — CLINICAL SUPPORT (OUTPATIENT)
Dept: OBGYN CLINIC | Facility: MEDICAL CENTER | Age: 23
End: 2023-01-05

## 2023-01-05 VITALS — DIASTOLIC BLOOD PRESSURE: 74 MMHG | BODY MASS INDEX: 32.39 KG/M2 | WEIGHT: 213 LBS | SYSTOLIC BLOOD PRESSURE: 118 MMHG

## 2023-01-05 DIAGNOSIS — Z30.42 ENCOUNTER FOR MANAGEMENT AND INJECTION OF DEPO-PROVERA: Primary | ICD-10-CM

## 2023-01-05 DIAGNOSIS — Z30.42 SURVEILLANCE FOR DEPO-PROVERA CONTRACEPTION: ICD-10-CM

## 2023-01-05 RX ORDER — MEDROXYPROGESTERONE ACETATE 150 MG/ML
150 INJECTION, SUSPENSION INTRAMUSCULAR
Qty: 1 ML | Refills: 2 | Status: SHIPPED | OUTPATIENT
Start: 2023-01-05

## 2023-01-05 RX ORDER — MEDROXYPROGESTERONE ACETATE 150 MG/ML
150 INJECTION, SUSPENSION INTRAMUSCULAR ONCE
Status: COMPLETED | OUTPATIENT
Start: 2023-01-05 | End: 2023-01-05

## 2023-01-05 RX ADMIN — MEDROXYPROGESTERONE ACETATE 150 MG: 150 INJECTION, SUSPENSION INTRAMUSCULAR at 15:59

## 2023-01-05 NOTE — PROGRESS NOTES
Pt is here for Depo Provera injection  Her last dose was 10/20/22  Her annual exam was on 10/20/22  Urine pregnancy test done: NO     Result:  N/A  Depo given in R BUTTOCK  Tolerated well    Lot [id number] Exp 01/2025  Next dose due 3/23-4/6  Refill needed YES

## 2023-03-31 ENCOUNTER — CLINICAL SUPPORT (OUTPATIENT)
Dept: OBGYN CLINIC | Facility: MEDICAL CENTER | Age: 23
End: 2023-03-31

## 2023-03-31 VITALS — WEIGHT: 215 LBS | BODY MASS INDEX: 32.69 KG/M2

## 2023-03-31 DIAGNOSIS — Z30.42 ENCOUNTER FOR DEPO-PROVERA CONTRACEPTION: Primary | ICD-10-CM

## 2023-03-31 RX ORDER — MEDROXYPROGESTERONE ACETATE 150 MG/ML
150 INJECTION, SUSPENSION INTRAMUSCULAR ONCE
Status: COMPLETED | OUTPATIENT
Start: 2023-03-31 | End: 2023-03-31

## 2023-03-31 RX ADMIN — MEDROXYPROGESTERONE ACETATE 150 MG: 150 INJECTION, SUSPENSION INTRAMUSCULAR at 13:44

## 2023-03-31 NOTE — PROGRESS NOTES
Pt is here for Depo Provera injection  Her last dose was 1/5/23  Her annual exam was on 10/20/22  Urine pregnancy test done: no   Result: na  Depo given in Left  Buttock   Tolerated well  Lot [id number] Exp 4/30/27  Next dose due 6/16-6/30     Refill needed no

## 2023-04-02 ENCOUNTER — OFFICE VISIT (OUTPATIENT)
Dept: URGENT CARE | Facility: MEDICAL CENTER | Age: 23
End: 2023-04-02

## 2023-04-02 VITALS
DIASTOLIC BLOOD PRESSURE: 85 MMHG | BODY MASS INDEX: 32.58 KG/M2 | SYSTOLIC BLOOD PRESSURE: 140 MMHG | HEART RATE: 99 BPM | OXYGEN SATURATION: 99 % | TEMPERATURE: 99.3 F | HEIGHT: 68 IN | WEIGHT: 215 LBS | RESPIRATION RATE: 18 BRPM

## 2023-04-02 DIAGNOSIS — B34.9 ACUTE VIRAL SYNDROME: Primary | ICD-10-CM

## 2023-04-02 LAB
S PYO AG THROAT QL: NEGATIVE
SARS-COV-2 AG UPPER RESP QL IA: NEGATIVE
VALID CONTROL: NORMAL

## 2023-04-02 RX ORDER — MEDROXYPROGESTERONE ACETATE 150 MG/ML
INJECTION, SUSPENSION INTRAMUSCULAR
COMMUNITY
Start: 2023-03-17

## 2023-04-02 RX ORDER — ONDANSETRON 4 MG/1
4-8 TABLET, FILM COATED ORAL EVERY 8 HOURS PRN
Qty: 20 TABLET | Refills: 0 | Status: SHIPPED | OUTPATIENT
Start: 2023-04-02

## 2023-04-02 NOTE — PATIENT INSTRUCTIONS
1  Over-the-counter ibuprofen and/or acetaminophen as needed for pain or fever  2  Oxymetazoline nasal spray 2 sprays in each nostril every 12 hours for no more than the next 5 days as needed for nasal congestion  3  Over-the-counter guaifenesin as needed for mucus relief  4  Gargle salt water as needed for sore throat relief  5  Increase oral fluid consumption  6  Follow-up with primary care provider in 7 days for any persistent symptoms  7   Go to the ER immediately for any worsening symptoms

## 2023-04-02 NOTE — PROGRESS NOTES
[facility] [employer] [facility]        NAME: Cole Lima is a 25 y o  female  : 2000    MRN: [id number]  DATE: 2023  TIME: 12:34 PM    Assessment and Plan   Acute viral syndrome [B34 9]  1  Acute viral syndrome  POCT rapid strepA    Throat culture    Poct Covid 19 Rapid Antigen Test    Covid/Flu-Office Collect            Patient Instructions     1  Over-the-counter ibuprofen and/or acetaminophen as needed for pain or fever  2  Oxymetazoline nasal spray 2 sprays in each nostril every 12 hours for no more than the next 5 days as needed for nasal congestion  3  Over-the-counter guaifenesin as needed for mucus relief  4  Gargle salt water as needed for sore throat relief  5  Increase oral fluid consumption  6  Follow-up with primary care provider in 7 days for any persistent symptoms  7   Go to the ER immediately for any worsening symptoms  Chief Complaint     Chief Complaint   Patient presents with   • Sore Throat     Pt reports sore throat, nausea, upper abdominal pain x2 days         History of Present Illness       16-year-old female patient with a 2-day history of sore throat, fever, chills, body aches, nonspecific abdominal pain, nausea  She does have slight diarrhea  Patient also complains of headache  Denies any chest pain or shortness of breath  Boyfriend has similar symptoms  Review of Systems   Review of Systems   Constitutional: Positive for chills, fatigue and fever  HENT: Positive for sore throat  Negative for congestion, facial swelling, rhinorrhea, sinus pressure and sinus pain  Respiratory: Negative for cough  Cardiovascular: Negative for chest pain  Gastrointestinal: Positive for abdominal pain, diarrhea and nausea  Negative for vomiting  Musculoskeletal: Positive for myalgias  Skin: Negative for rash           Current Medications       Current Outpatient Medications:   •  acetaminophen (TYLENOL) 325 mg tablet, Take 3 tablets (975 mg total) by mouth 3 (three) times a day as needed for mild pain, Disp: 30 tablet, Rfl: 0  •  Ferrous Sulfate (IRON) 325 (65 Fe) MG TABS, Take 1 tablet by mouth daily , Disp: , Rfl:   •  medroxyPROGESTERone (DEPO-PROVERA) 150 mg/mL injection, Inject 1 mL (150 mg total) into a muscle every 3 (three) months, Disp: 1 mL, Rfl: 2  •  medroxyPROGESTERone acetate (DEPO-PROVERA SYRINGE) 150 mg/mL injection, , Disp: , Rfl:   •  promethazine (PHENERGAN) 25 mg tablet, Take 1/2 - 1 tab po qd-tid prn anxiety, Disp: 90 tablet, Rfl: 2  •  benzonatate (TESSALON) 200 MG capsule, Take 1 capsule (200 mg total) by mouth 3 (three) times a day as needed for cough (Patient not taking: Reported on 7/22/2022), Disp: 20 capsule, Rfl: 0  •  benztropine (COGENTIN) 1 mg tablet, , Disp: , Rfl:   •  ibuprofen (MOTRIN) 600 mg tablet, TAKE 1 TABLET BY MOUTH EVERY 4 TO 6 HOURS OR AS NEEDED (Patient not taking: Reported on 4/2/2023), Disp: , Rfl:   •  Loratadine (CLARITIN PO), Take by mouth daily  (Patient not taking: Reported on 3/31/2023), Disp: , Rfl:   •  naproxen (Naprosyn) 500 mg tablet, Take 1 tablet (500 mg total) by mouth 2 (two) times a day with meals for 5 days, Disp: 30 tablet, Rfl: 0  •  ondansetron (Zofran ODT) 4 mg disintegrating tablet, Take 1 tablet (4 mg total) by mouth every 8 (eight) hours as needed for nausea or vomiting for up to 2 days, Disp: 5 tablet, Rfl: 0  •  ondansetron (ZOFRAN) 4 mg tablet, Take 1 tablet (4 mg total) by mouth every 6 (six) hours (Patient not taking: Reported on 3/31/2023), Disp: 12 tablet, Rfl: 0  •  VITAMIN D PO, Take by mouth (Patient not taking: Reported on 3/31/2023), Disp: , Rfl:   •  ziprasidone (GEODON) 20 mg capsule, , Disp: , Rfl:     Current Allergies     Allergies as of 04/02/2023   • (No Known Allergies)            The following portions of the patient's history were reviewed and updated as appropriate: allergies, current medications, past family history, past medical history, past social history, past surgical history "and problem list      Past Medical History:   Diagnosis Date   • Allergic     pollen; animal dander   • Anemia    • Anxiety    • Asthma    • Closed left ankle fracture    • Depression    • Dysmenorrhea    • Influenza A 2/28/2020   • Insomnia    • Moderate single current episode of major depressive disorder ([facility] [address] ) 1/5/2017       Past Surgical History:   Procedure Laterality Date   • ANKLE SURGERY Left 2014    two screws       Family History   Problem Relation Age of Onset   • Bipolar disorder Mother         Fentanyl per Pt   • Drug abuse Mother    • No Known Problems Father    • Anxiety disorder Sister    • Bipolar disorder Sister    • Leukemia Paternal Aunt    • Autism spectrum disorder Cousin    • Bipolar disorder Half-Sister    • Breast cancer Neg Hx    • Ovarian cancer Neg Hx          Medications have been verified  Objective   /85 (BP Location: Left arm)   Pulse 99   Temp 99 3 °F (37 4 °C) (Temporal)   Resp 18   Ht 5' 8\" (1 727 m)   Wt 97 5 kg (215 lb)   SpO2 99%   BMI 32 69 kg/m²        Physical Exam     Physical Exam  Vitals and nursing note reviewed  Constitutional:       General: She is not in acute distress  Appearance: Normal appearance  She is well-developed  She is not ill-appearing or toxic-appearing  HENT:      Head: Normocephalic  Right Ear: Tympanic membrane normal       Left Ear: Tympanic membrane normal       Nose: No congestion or rhinorrhea  Mouth/Throat:      Mouth: Mucous membranes are moist       Pharynx: Posterior oropharyngeal erythema present  No pharyngeal swelling, oropharyngeal exudate or uvula swelling  Tonsils: No tonsillar exudate  0 on the right  0 on the left  Cardiovascular:      Rate and Rhythm: Normal rate and regular rhythm  Pulses: Normal pulses  Heart sounds: Normal heart sounds  Pulmonary:      Effort: Pulmonary effort is normal       Breath sounds: Normal breath sounds  Abdominal:      Tenderness:  There is no " abdominal tenderness  There is no guarding or rebound  Musculoskeletal:      Cervical back: Normal range of motion  Lymphadenopathy:      Cervical: No cervical adenopathy  Skin:     General: Skin is warm and dry  Capillary Refill: Capillary refill takes less than 2 seconds  Neurological:      Mental Status: She is alert and oriented to person, place, and time     Psychiatric:         Mood and Affect: Mood normal          Behavior: Behavior normal

## 2023-04-02 NOTE — LETTER
April 2, 2023     Patient: Ethel Marie   YOB: 2000   Date of Visit: 4/2/2023       To Whom it May Concern:    Ethel Marie was seen in my clinic on 4/2/2023  She is to be excuse for her absence from work pending the results of her COVID/flu test     If you have any questions or concerns, please don't hesitate to call           Sincerely,          Joe Ballard PA-C        CC: No Recipients

## 2023-04-03 LAB
FLUAV RNA RESP QL NAA+PROBE: NEGATIVE
FLUBV RNA RESP QL NAA+PROBE: NEGATIVE
SARS-COV-2 RNA RESP QL NAA+PROBE: NEGATIVE

## 2023-04-05 LAB — BACTERIA THROAT CULT: NORMAL

## 2023-05-22 ENCOUNTER — TELEPHONE (OUTPATIENT)
Dept: PSYCHIATRY | Facility: CLINIC | Age: 23
End: 2023-05-22

## 2023-05-22 DIAGNOSIS — F41.0 PANIC ATTACKS: ICD-10-CM

## 2023-05-22 DIAGNOSIS — F41.1 GENERALIZED ANXIETY DISORDER: ICD-10-CM

## 2023-05-22 RX ORDER — PROMETHAZINE HYDROCHLORIDE 25 MG/1
TABLET ORAL
Qty: 90 TABLET | Refills: 2 | OUTPATIENT
Start: 2023-05-22

## 2023-05-22 NOTE — TELEPHONE ENCOUNTER
Medication Refill Request     Name of Medication Phenergan  Dose/Frequency 25mg take 1/2 tab po qd-tid prn  Quantity 90  Verified pharmacy   [x]  Verified ordering Provider   [x]  Does patient have enough for the next 3 days? Yes [x] No []  Does patient have a follow-up appointment scheduled?  Yes [x] No []   If so when is appointment: 5/30/2023 3:30pm

## 2023-05-22 NOTE — TELEPHONE ENCOUNTER
Patient contacted the office to schedule a follow up visit with provider  Patient is now scheduled for 5/30/2023  at 3:30pm virtually

## 2023-05-23 ENCOUNTER — TELEPHONE (OUTPATIENT)
Dept: PSYCHIATRY | Facility: CLINIC | Age: 23
End: 2023-05-23

## 2023-05-23 NOTE — TELEPHONE ENCOUNTER
Writer spoke with patient to reschedule 5/30 appointment with Aliyah Rocha to a virtual appointment on 6/1 at 4:30pm  Patient will connect via [address]

## 2023-05-24 ENCOUNTER — TELEPHONE (OUTPATIENT)
Dept: PSYCHIATRY | Facility: CLINIC | Age: 23
End: 2023-05-24

## 2023-05-24 NOTE — TELEPHONE ENCOUNTER
Writer spoke with patient cancelling her 6/1 appointment at 4:30 due to provider being out of the office  Patient stated she has surgery happening on 6/6 and would not be available till 6/11  She is getting a new work schedule as well which makes her only available on Thursdays  Patients last completed appointment with provider was 6/9/2022  If scheduling after 6/9/2023 schedule as new patient (1 hour appointment)

## 2023-06-08 ENCOUNTER — TELEPHONE (OUTPATIENT)
Dept: PSYCHIATRY | Facility: CLINIC | Age: 23
End: 2023-06-08

## 2023-06-08 DIAGNOSIS — F41.0 PANIC ATTACKS: ICD-10-CM

## 2023-06-08 DIAGNOSIS — F41.1 GENERALIZED ANXIETY DISORDER: ICD-10-CM

## 2023-06-08 NOTE — TELEPHONE ENCOUNTER
Patient contacted the office to schedule a follow up visit with provider  Patient is now scheduled for 6/9/2023  at 3:30pm virtually

## 2023-06-08 NOTE — TELEPHONE ENCOUNTER
Patient contacted the office to schedule a follow up visit with provider  Patient is now scheduled for 8/9/2023  at 11am virtually

## 2023-06-08 NOTE — TELEPHONE ENCOUNTER
Medication Refill Request     Name of Medication Phenergan  Dose/Frequency 25mg take 1/2-1 tab prn  Quantity 90  Verified pharmacy   [x]  Verified ordering Provider   [x]  Does patient have enough for the next 3 days? Yes [x] No []  Does patient have a follow-up appointment scheduled?  Yes [x] No []   If so when is appointment: 8/9/2023 11am

## 2023-06-15 RX ORDER — PROMETHAZINE HYDROCHLORIDE 25 MG/1
TABLET ORAL
Qty: 90 TABLET | Refills: 2 | OUTPATIENT
Start: 2023-06-15

## 2023-06-29 ENCOUNTER — TELEPHONE (OUTPATIENT)
Dept: FAMILY MEDICINE CLINIC | Facility: MEDICAL CENTER | Age: 23
End: 2023-06-29

## 2023-06-29 NOTE — TELEPHONE ENCOUNTER
Pt c/o of upper gastro burning sensation for two days with occasional diarrhea  Pt had some nausea yesterday but it has resolved today  I scheduled pt for appt tomorrow  Please, advise if it is not appropriate or triage

## 2023-07-02 ENCOUNTER — OFFICE VISIT (OUTPATIENT)
Dept: URGENT CARE | Facility: MEDICAL CENTER | Age: 23
End: 2023-07-02
Payer: COMMERCIAL

## 2023-07-02 VITALS
WEIGHT: 214 LBS | BODY MASS INDEX: 31.7 KG/M2 | OXYGEN SATURATION: 96 % | TEMPERATURE: 98.7 F | SYSTOLIC BLOOD PRESSURE: 125 MMHG | DIASTOLIC BLOOD PRESSURE: 82 MMHG | HEIGHT: 69 IN | HEART RATE: 98 BPM | RESPIRATION RATE: 18 BRPM

## 2023-07-02 DIAGNOSIS — S61.412A LACERATION OF LEFT HAND WITHOUT FOREIGN BODY, INITIAL ENCOUNTER: Primary | ICD-10-CM

## 2023-07-02 PROCEDURE — 99213 OFFICE O/P EST LOW 20 MIN: CPT | Performed by: PHYSICIAN ASSISTANT

## 2023-07-02 NOTE — LETTER
July 2, 2023     Patient: Andrés Hylton   YOB: 2000   Date of Visit: 7/2/2023       To Whom it May Concern:    Andrés Hylton was seen in my clinic on 7/2/2023. She may return to work on 7/5/2023. If you have any questions or concerns, please don't hesitate to call.          Sincerely,          [facility]        CC: No Recipients

## 2023-07-02 NOTE — PATIENT INSTRUCTIONS
Laceration left hand  Dermabond applied  Keep area clean and dry  Follow up with PCP in 3-5 days. Proceed to  ER if symptoms worsen.

## 2023-07-02 NOTE — PROGRESS NOTES
[facility]        NAME: Michael Lui is a 25 y.o. female  : 2000    MRN: [id number]  DATE: 2023  TIME: 7:22 PM    Assessment and Plan   Laceration of left hand without foreign body, initial encounter [S61.412A]  1. Laceration of left hand without foreign body, initial encounter              Patient Instructions     Laceration left hand  Dermabond applied  Keep area clean and dry  Follow up with PCP in 3-5 days. Proceed to  ER if symptoms worsen. Chief Complaint     Chief Complaint   Patient presents with   • Laceration     Pt. With a laceration to her left hand from a kitchen knife. History of Present Illness       69-year-old female who presents complaining of laceration to left hand. Patient states that she was trying to open a can with a knife and the knife slipped and she stabbed herself. Patient denies any loss of movement. Last tetanus injection given in       Review of Systems   Review of Systems   Constitutional: Negative. HENT: Negative. Eyes: Negative. Respiratory: Negative. Negative for cough, chest tightness, shortness of breath, wheezing and stridor. Cardiovascular: Negative. Negative for chest pain, palpitations and leg swelling. Skin: Positive for wound.          Current Medications       Current Outpatient Medications:   •  promethazine (PHENERGAN) 25 mg tablet, Take 1/2 - 1 tab po qd-tid prn anxiety, Disp: 90 tablet, Rfl: 2  •  acetaminophen (TYLENOL) 325 mg tablet, Take 3 tablets (975 mg total) by mouth 3 (three) times a day as needed for mild pain (Patient not taking: Reported on 2023), Disp: 30 tablet, Rfl: 0  •  benzonatate (TESSALON) 200 MG capsule, Take 1 capsule (200 mg total) by mouth 3 (three) times a day as needed for cough (Patient not taking: Reported on 2022), Disp: 20 capsule, Rfl: 0  •  benztropine (COGENTIN) 1 mg tablet, , Disp: , Rfl:   •  Ferrous Sulfate (IRON) 325 (65 Fe) MG TABS, Take 1 tablet by mouth daily (Patient not taking: Reported on 7/2/2023), Disp: , Rfl:   •  ibuprofen (MOTRIN) 600 mg tablet, TAKE 1 TABLET BY MOUTH EVERY 4 TO 6 HOURS OR AS NEEDED (Patient not taking: Reported on 4/2/2023), Disp: , Rfl:   •  Loratadine (CLARITIN PO), Take by mouth daily  (Patient not taking: Reported on 3/31/2023), Disp: , Rfl:   •  medroxyPROGESTERone (DEPO-PROVERA) 150 mg/mL injection, Inject 1 mL (150 mg total) into a muscle every 3 (three) months (Patient not taking: Reported on 7/2/2023), Disp: 1 mL, Rfl: 2  •  medroxyPROGESTERone acetate (DEPO-PROVERA SYRINGE) 150 mg/mL injection, , Disp: , Rfl:   •  naproxen (Naprosyn) 500 mg tablet, Take 1 tablet (500 mg total) by mouth 2 (two) times a day with meals for 5 days, Disp: 30 tablet, Rfl: 0  •  ondansetron (ZOFRAN) 4 mg tablet, Take 1-2 tablets (4-8 mg total) by mouth every 8 (eight) hours as needed for nausea or vomiting (Patient not taking: Reported on 7/2/2023), Disp: 20 tablet, Rfl: 0  •  VITAMIN D PO, Take by mouth (Patient not taking: Reported on 3/31/2023), Disp: , Rfl:   •  ziprasidone (GEODON) 20 mg capsule, , Disp: , Rfl:     Current Allergies     Allergies as of 07/02/2023   • (No Known Allergies)            The following portions of the patient's history were reviewed and updated as appropriate: allergies, current medications, past family history, past medical history, past social history, past surgical history and problem list.     Past Medical History:   Diagnosis Date   • Allergic     pollen; animal dander   • Anemia    • Anxiety    • Asthma    • Closed left ankle fracture    • Depression    • Dysmenorrhea    • Influenza A 2/28/2020   • Insomnia    • Moderate single current episode of major depressive disorder ([address]) 1/5/2017       Past Surgical History:   Procedure Laterality Date   • ANKLE SURGERY Left 2014    two screws       Family History   Problem Relation Age of Onset   • Bipolar disorder Mother         Fentanyl per Pt   • Drug abuse Mother    • No Known Problems Father    • Anxiety disorder Sister    • Bipolar disorder Sister    • Leukemia Paternal Aunt    • Autism spectrum disorder Cousin    • Bipolar disorder Half-Sister    • Breast cancer Neg Hx    • Ovarian cancer Neg Hx          Medications have been verified. Objective   /82   Pulse 98   Temp 98.7 °F (37.1 °C)   Resp 18   Ht 5' 8.5" (1.74 m)   Wt 97.1 kg (214 lb)   SpO2 96%   BMI 32.07 kg/m²        Physical Exam     Physical Exam  Constitutional:       Appearance: Normal appearance. She is well-developed. HENT:      Head: Normocephalic and atraumatic. Cardiovascular:      Rate and Rhythm: Normal rate and regular rhythm. Heart sounds: Normal heart sounds. Pulmonary:      Effort: Pulmonary effort is normal. No respiratory distress. Breath sounds: Normal breath sounds. No wheezing or rales. Chest:      Chest wall: No tenderness. Musculoskeletal:        Arms:       Cervical back: Normal range of motion and neck supple. Lymphadenopathy:      Cervical: No cervical adenopathy. Neurological:      Mental Status: She is alert.          Area cleaned and prepped in sterile fashion and Dermabond applied without complications

## 2023-07-10 ENCOUNTER — TELEPHONE (OUTPATIENT)
Dept: FAMILY MEDICINE CLINIC | Facility: MEDICAL CENTER | Age: 23
End: 2023-07-10

## 2023-07-10 NOTE — TELEPHONE ENCOUNTER
Pt called to request advice. She noticed a lump in her left breast last night. It is just tender. It is not red, swollen or warm to the touch. She said she has a history of having had a cyst in that area in the past.  Per Clinical, pt should call her ob/gyn for appt to assess. Relayed Clinical's message to pt.

## 2023-07-12 DIAGNOSIS — F41.0 PANIC ATTACKS: ICD-10-CM

## 2023-07-12 DIAGNOSIS — F41.1 GENERALIZED ANXIETY DISORDER: ICD-10-CM

## 2023-07-12 NOTE — TELEPHONE ENCOUNTER
Medication Refill Request     Name of Medication Promethazine  Dose/Frequency 25mg  Quantity 1/2 tab  Verified pharmacy   [x]  Verified ordering Provider   [x]  Does patient have enough for the next 3 days? Yes [] No [x]  Does patient have a follow-up appointment scheduled?  Yes [x] No []   If so when is appointment: 8/9

## 2023-07-13 RX ORDER — PROMETHAZINE HYDROCHLORIDE 25 MG/1
TABLET ORAL
Qty: 90 TABLET | Refills: 2 | OUTPATIENT
Start: 2023-07-13

## 2023-07-19 DIAGNOSIS — F41.1 GENERALIZED ANXIETY DISORDER: ICD-10-CM

## 2023-07-19 DIAGNOSIS — F41.0 PANIC ATTACKS: ICD-10-CM

## 2023-07-19 RX ORDER — PROMETHAZINE HYDROCHLORIDE 25 MG/1
TABLET ORAL
Qty: 90 TABLET | Refills: 0 | Status: SHIPPED | OUTPATIENT
Start: 2023-07-19

## 2023-07-19 NOTE — TELEPHONE ENCOUNTER
Patient contacted the office seeking an update on her refill request for her Promethazine 25 mg tablets. Patient stated she running low on medication and wants to know when the refill will be sent to the pharmacy. Writer informed patient we are waiting for the covering provider to review and approve prescription.

## 2023-07-19 NOTE — TELEPHONE ENCOUNTER
Called and spoke to pharmacist- Prescription from  . Will need new prescription sent.  Please review in Gladys's absence

## 2023-07-19 NOTE — TELEPHONE ENCOUNTER
Patient was calling because she has not received her medication and she stated she needs that medication, writer transferred caller to nursing line for assistance

## 2023-07-23 RX ORDER — PROMETHAZINE HYDROCHLORIDE 25 MG/1
TABLET ORAL
Qty: 90 TABLET | Refills: 2 | OUTPATIENT
Start: 2023-07-23

## 2023-07-24 ENCOUNTER — OFFICE VISIT (OUTPATIENT)
Dept: OBGYN CLINIC | Facility: CLINIC | Age: 23
End: 2023-07-24
Payer: COMMERCIAL

## 2023-07-24 VITALS
HEIGHT: 66 IN | SYSTOLIC BLOOD PRESSURE: 120 MMHG | WEIGHT: 213 LBS | BODY MASS INDEX: 34.23 KG/M2 | DIASTOLIC BLOOD PRESSURE: 70 MMHG

## 2023-07-24 DIAGNOSIS — N63.20 MASS OF LEFT BREAST, UNSPECIFIED QUADRANT: Primary | ICD-10-CM

## 2023-07-24 PROBLEM — Z30.42 ENCOUNTER FOR DEPO-PROVERA CONTRACEPTION: Status: RESOLVED | Noted: 2018-10-25 | Resolved: 2023-07-24

## 2023-07-24 PROBLEM — Z30.42 ENCOUNTER FOR SURVEILLANCE OF INJECTABLE CONTRACEPTIVE: Status: RESOLVED | Noted: 2020-07-07 | Resolved: 2023-07-24

## 2023-07-24 PROCEDURE — 99213 OFFICE O/P EST LOW 20 MIN: CPT | Performed by: CLINICAL NURSE SPECIALIST

## 2023-07-24 NOTE — PROGRESS NOTES
Assessment/Plan:       1. Mass of left breast, unspecified quadrant  Assessment & Plan:  Pt c/o mass x 1 wk. Mass not well felt by myself- feels more like a ridge of denser breast tissue. Will check imaging. Orders:  -     US breast left limited (diagnostic); Future; Expected date: 07/24/2023          Subjective:      Patient ID: Enmanuel Jenkins is a 26 y.o. female. She is here for Breast Mass (A week ago found a lump under breast has history of lump around the same spot/Patient says it is tender has no discharge from nipple/Recently has came off birth control (depo) /)    HPI    Pt c/o  Lump on left breast about a week ago. Non tender  No trauma to area  Denies nipple d/c  Hx of breast cyst in left breast at age 14 right around the same area. Has been on depo for years and recently stopped. Not presently sexually active. Menstrual History:  Patient's last menstrual period was 07/01/2023 (approximate). The following portions of the patient's history were reviewed and updated as appropriate: allergies, current medications, past family history, past medical history, past social history, past surgical history, and problem list.    Review of Systems  See HPI for pertinent positives          Objective:    /70 (BP Location: Left arm, Patient Position: Sitting, Cuff Size: Extra-Large)   Ht 5' 5.8" (1.671 m)   Wt 96.6 kg (213 lb)   LMP 07/01/2023 (Approximate)   BMI 34.59 kg/m²      Physical Exam  Constitutional:       General: She is not in acute distress. Appearance: Normal appearance. Genitourinary:      Genitourinary Comments: Pelvic exam deferred     Cardiovascular:      Rate and Rhythm: Normal rate. Pulmonary:      Effort: Pulmonary effort is normal.   Chest:       Abdominal:      General: There is no distension. Palpations: Abdomen is soft. Musculoskeletal:         General: Normal range of motion.    Neurological:      Mental Status: She is alert and oriented to person, place, and time. Skin:     General: Skin is warm and dry.    Psychiatric:         Mood and Affect: Mood normal.         Behavior: Behavior normal.

## 2023-07-24 NOTE — ASSESSMENT & PLAN NOTE
Pt c/o mass x 1 wk. Mass not well felt by myself- feels more like a ridge of denser breast tissue. Will check imaging.

## 2023-08-01 ENCOUNTER — HOSPITAL ENCOUNTER (OUTPATIENT)
Dept: ULTRASOUND IMAGING | Facility: CLINIC | Age: 23
Discharge: HOME | End: 2023-08-01
Payer: COMMERCIAL

## 2023-08-01 DIAGNOSIS — N63.20 MASS OF LEFT BREAST, UNSPECIFIED QUADRANT: ICD-10-CM

## 2023-08-01 PROCEDURE — 76642 ULTRASOUND BREAST LIMITED: CPT

## 2023-08-08 ENCOUNTER — OFFICE VISIT (OUTPATIENT)
Dept: FAMILY MEDICINE CLINIC | Facility: MEDICAL CENTER | Age: 23
End: 2023-08-08
Payer: COMMERCIAL

## 2023-08-08 VITALS
WEIGHT: 214.6 LBS | BODY MASS INDEX: 34.49 KG/M2 | HEART RATE: 78 BPM | SYSTOLIC BLOOD PRESSURE: 122 MMHG | TEMPERATURE: 97.2 F | OXYGEN SATURATION: 98 % | DIASTOLIC BLOOD PRESSURE: 84 MMHG | HEIGHT: 66 IN

## 2023-08-08 DIAGNOSIS — L25.9 CONTACT DERMATITIS, UNSPECIFIED CONTACT DERMATITIS TYPE, UNSPECIFIED TRIGGER: Primary | ICD-10-CM

## 2023-08-08 PROCEDURE — 99213 OFFICE O/P EST LOW 20 MIN: CPT

## 2023-08-08 NOTE — PROGRESS NOTES
Assessment/Plan:    Symptom-based treatment discussed with patient, triamcinolone ointment prescribed. Advised to call the office if symptoms worsen or fail to improve. 1. Contact dermatitis, unspecified contact dermatitis type, unspecified trigger  Continue loratadine daily. Apply triamcinolone ointment twice daily over the next 2 weeks. Call the office if symptoms worsen or fail to improve. - triamcinolone (KENALOG) 0.1 % ointment; Apply topically 2 (two) times a day  Dispense: 30 g; Refill: 0      Subjective:      Patient ID: Reji Malik is a 26 y.o. female. 26year old female presents with red, itchy rash noted to chest and left side of neck x 2 days. She used loratadine and hydrocortisone cream yesterday which helped significantly with rash. She recently had tattoos done in July which are now healed and she reports rash seems to have started after stopping use of Aquaphor. Denies difficulty breathing, swallowing. The following portions of the patient's history were reviewed and updated as appropriate: allergies, current medications, past family history, past social history, past surgical history and problem list.    Review of Systems   Constitutional: Negative. HENT: Negative. Eyes: Negative. Respiratory: Negative. Cardiovascular: Negative. Gastrointestinal: Negative. Endocrine: Negative. Genitourinary: Negative. Musculoskeletal: Negative. Skin: Positive for rash. Allergic/Immunologic: Negative. Neurological: Negative. Hematological: Negative. Psychiatric/Behavioral: Negative. Objective:      /84 (BP Location: Left arm, Patient Position: Sitting, Cuff Size: Large)   Pulse 78   Temp (!) 97.2 °F (36.2 °C)   Ht 5' 5.8" (1.671 m)   Wt 97.3 kg (214 lb 9.6 oz)   LMP 07/01/2023 (Approximate)   SpO2 98%   BMI 34.85 kg/m²          Physical Exam  Vitals and nursing note reviewed.    Constitutional:       General: She is not in acute distress. Appearance: Normal appearance. She is not ill-appearing, toxic-appearing or diaphoretic. HENT:      Head: Normocephalic and atraumatic. Cardiovascular:      Rate and Rhythm: Normal rate. Pulmonary:      Effort: Pulmonary effort is normal. No respiratory distress. Musculoskeletal:         General: Normal range of motion. Skin:     General: Skin is warm and dry. Findings: Rash present. Comments: Red, confluent rash noted to anterior chest, left side of neck, posterior neck. Neurological:      General: No focal deficit present. Mental Status: She is alert and oriented to person, place, and time. Mental status is at baseline. Psychiatric:         Mood and Affect: Mood normal.         Behavior: Behavior normal.         Thought Content:  Thought content normal.                        Ricky Marquis

## 2023-08-09 ENCOUNTER — TELEPHONE (OUTPATIENT)
Dept: PSYCHIATRY | Facility: CLINIC | Age: 23
End: 2023-08-09

## 2023-08-09 NOTE — TELEPHONE ENCOUNTER
Writer called the pt and was able to find a new pt appt for 10/19/2023  At 10 am. Writer added the appt to a cancellation list .

## 2023-08-09 NOTE — TELEPHONE ENCOUNTER
Pt called in and stated that she missed the appt today due to being sick and she forgot to call in to cancel and reschedule. Writer was able to get the pt scheduled for 8/16/2023  At 10 am virtually.

## 2023-08-14 ENCOUNTER — TELEPHONE (OUTPATIENT)
Dept: PSYCHIATRY | Facility: CLINIC | Age: 23
End: 2023-08-14

## 2023-08-14 NOTE — LETTER
23     Will Estevez   : 2000   [address] [place] [address]  [place] [phone]       It is the policy of [facility] to monitor and manage appointments that have been no-showed or cancelled with less than 48-hour notice. This is necessary to ensure that we are able to provide timely access for all patients to our providers. Undue numbers of unutilized appointments delays necessary medical care for all patients. Dear Will Estevez : We are sorry that you missed your appointment with Aubrey Gaviria PA-C on 2023. It has been 2 months  since your last appointment. Your health and follow-up care are important to us. We want to make you aware of your next appointment with Aubrey Gaviria PA-C that is scheduled for Thursday,  10/19/2023 at 10:00 am.    Please be aware that our office policy states that if you 'no show' two Medication Management  appointments in a row without prior notice of cancellation, or three or more in a calendar year, you may be discharged from Medication Management  treatment. We want to bring this to your attention now to prevent an interruption of your care. If you have any questions about this policy, please call us at the number above. If we do not hear from you within 10 business days to make a follow up appointment, we will assume you are no longer interested in care here. Thank you in advance for your cooperation and assistance.        Sincerely,      [facility] Support Staff

## 2023-08-14 NOTE — TELEPHONE ENCOUNTER
No show letter will be sent to: Lawrence Oliveros Address: [address] [place] [address] [place] [address]

## 2023-10-19 ENCOUNTER — TELEPHONE (OUTPATIENT)
Dept: PSYCHIATRY | Facility: CLINIC | Age: 23
End: 2023-10-19

## 2023-10-19 NOTE — TELEPHONE ENCOUNTER
Writer called at 10:29 and was advised she missed appt and stated that she will no longer be coming back for services.

## 2023-10-23 ENCOUNTER — TELEPHONE (OUTPATIENT)
Dept: OBGYN CLINIC | Facility: CLINIC | Age: 23
End: 2023-10-23

## 2023-10-23 NOTE — TELEPHONE ENCOUNTER
Pt has been on depo-provera for 8 yrs. She just stopped it in March. She had some spotting in July and Aug and a fairly heavy menses this month. I told pt to write down menses on a calendar so she has a record of them. She is getting  soon and will want to conceive.

## 2023-10-31 ENCOUNTER — OFFICE VISIT (OUTPATIENT)
Dept: FAMILY MEDICINE CLINIC | Facility: MEDICAL CENTER | Age: 23
End: 2023-10-31
Payer: COMMERCIAL

## 2023-10-31 VITALS
HEART RATE: 97 BPM | HEIGHT: 69 IN | RESPIRATION RATE: 18 BRPM | SYSTOLIC BLOOD PRESSURE: 124 MMHG | OXYGEN SATURATION: 98 % | DIASTOLIC BLOOD PRESSURE: 68 MMHG | WEIGHT: 214 LBS | BODY MASS INDEX: 31.7 KG/M2 | TEMPERATURE: 98.6 F

## 2023-10-31 DIAGNOSIS — Z00.00 ANNUAL PHYSICAL EXAM: Primary | ICD-10-CM

## 2023-10-31 DIAGNOSIS — Z13.220 SCREENING FOR LIPID DISORDERS: ICD-10-CM

## 2023-10-31 PROCEDURE — 99395 PREV VISIT EST AGE 18-39: CPT

## 2023-10-31 RX ORDER — PROMETHAZINE HYDROCHLORIDE 25 MG/1
12.5 SUPPOSITORY RECTAL EVERY 6 HOURS PRN
COMMUNITY

## 2023-10-31 NOTE — PATIENT INSTRUCTIONS
Wellness Visit for Adults   AMBULATORY CARE:   A wellness visit  is when you see your healthcare provider to get screened for health problems. Your healthcare provider will also give you advice on how to stay healthy. Write down your questions so you remember to ask them. Ask your healthcare provider how often you should have a wellness visit. What happens at a wellness visit:  Your healthcare provider will ask about your health, and your family history of health problems. This includes high blood pressure, heart disease, and cancer. He or she will ask if you have symptoms that concern you, if you smoke, and about your mood. You may also be asked about your intake of medicines, supplements, food, and alcohol. Any of the following may be done: Your weight  will be checked. Your height may also be checked so your body mass index (BMI) can be calculated. Your BMI shows if you are at a healthy weight. Your blood pressure  and heart rate will be checked. Your temperature may also be checked. Blood and urine tests  may be done. Blood tests may be done to check your cholesterol levels. Abnormal cholesterol levels increase your risk for heart disease and stroke. You may also need a blood or urine test to check for diabetes if you are at increased risk. Urine tests may be done to look for signs of an infection or kidney disease. A physical exam  includes checking your heartbeat and lungs with a stethoscope. Your healthcare provider may also check your skin to look for sun damage. Screening tests  may be recommended. A screening test is done to check for diseases that may not cause symptoms. The screening tests you may need depend on your age, gender, family history, and lifestyle habits. For example, colorectal screening may be recommended if you are 48years old or older. Screening tests you need if you are a woman:   A Pap smear  is used to screen for cervical cancer.  Pap smears are usually done every 3 to 5 years depending on your age. You may need them more often if you have had abnormal Pap smear test results in the past. Ask your healthcare provider how often you should have a Pap smear. A mammogram  is an x-ray of your breasts to screen for breast cancer. Experts recommend mammograms every 2 years starting at age 48 years. You may need a mammogram at age 52 years or younger if you have an increased risk for breast cancer. Talk to your healthcare provider about when you should start having mammograms and how often you need them. Vaccines you may need:   Get an influenza vaccine  every year. The influenza vaccine protects you from the flu. Several types of viruses cause the flu. The viruses change over time, so new vaccines are made each year. Get a tetanus-diphtheria (Td) booster vaccine  every 10 years. This vaccine protects you against tetanus and diphtheria. Tetanus is a severe infection that may cause painful muscle spasms and lockjaw. Diphtheria is a severe bacterial infection that causes a thick covering in the back of your mouth and throat. Get a human papillomavirus (HPV) vaccine  if you are female and aged 23 to 32 or male 23 to 24 and never received it. This vaccine protects you from HPV infection. HPV is the most common infection spread by sexual contact. HPV may also cause vaginal, penile, and anal cancers. Get a pneumococcal vaccine  if you are aged 72 years or older. The pneumococcal vaccine is an injection given to protect you from pneumococcal disease. Pneumococcal disease is an infection caused by pneumococcal bacteria. The infection may cause pneumonia, meningitis, or an ear infection. Get a shingles vaccine  if you are 60 or older, even if you have had shingles before. The shingles vaccine is an injection to protect you from the varicella-zoster virus. This is the same virus that causes chickenpox.  Shingles is a painful rash that develops in people who had chickenpox or have been exposed to the virus. How to eat healthy:  My Plate is a model for planning healthy meals. It shows the types and amounts of foods that should go on your plate. Fruits and vegetables make up about half of your plate, and grains and protein make up the other half. A serving of dairy is included on the side of your plate. The amount of calories and serving sizes you need depends on your age, gender, weight, and height. Examples of healthy foods are listed below:  Eat a variety of vegetables  such as dark green, red, and orange vegetables. You can also include canned vegetables low in sodium (salt) and frozen vegetables without added butter or sauces. Eat a variety of fresh fruits , canned fruit in 100% juice, frozen fruit, and dried fruit. Include whole grains. At least half of the grains you eat should be whole grains. Examples include whole-wheat bread, wheat pasta, brown rice, and whole-grain cereals such as oatmeal.    Eat a variety of protein foods such as seafood (fish and shellfish), lean meat, and poultry without skin (turkey and chicken). Examples of lean meats include pork leg, shoulder, or tenderloin, and beef round, sirloin, tenderloin, and extra lean ground beef. Other protein foods include eggs and egg substitutes, beans, peas, soy products, nuts, and seeds. Choose low-fat dairy products such as skim or 1% milk or low-fat yogurt, cheese, and cottage cheese. Limit unhealthy fats  such as butter, hard margarine, and shortening. Exercise:  Exercise at least 30 minutes per day on most days of the week. Some examples of exercise include walking, biking, dancing, and swimming. You can also fit in more physical activity by taking the stairs instead of the elevator or parking farther away from stores. Include muscle strengthening activities 2 days each week. Regular exercise provides many health benefits.  It helps you manage your weight, and decreases your risk for type 2 diabetes, heart disease, stroke, and high blood pressure. Exercise can also help improve your mood. Ask your healthcare provider about the best exercise plan for you. General health and safety guidelines:   Do not smoke. Nicotine and other chemicals in cigarettes and cigars can cause lung damage. Ask your healthcare provider for information if you currently smoke and need help to quit. E-cigarettes or smokeless tobacco still contain nicotine. Talk to your healthcare provider before you use these products. Limit alcohol. A drink of alcohol is 12 ounces of beer, 5 ounces of wine, or 1½ ounces of liquor. Lose weight, if needed. Being overweight increases your risk of certain health conditions. These include heart disease, high blood pressure, type 2 diabetes, and certain types of cancer. Protect your skin. Do not sunbathe or use tanning beds. Use sunscreen with a SPF 15 or higher. Apply sunscreen at least 15 minutes before you go outside. Reapply sunscreen every 2 hours. Wear protective clothing, hats, and sunglasses when you are outside. Drive safely. Always wear your seatbelt. Make sure everyone in your car wears a seatbelt. A seatbelt can save your life if you are in an accident. Do not use your cell phone when you are driving. This could distract you and cause an accident. Pull over if you need to make a call or send a text message. Practice safe sex. Use latex condoms if are sexually active and have more than one partner. Your healthcare provider may recommend screening tests for sexually transmitted infections (STIs). Wear helmets, lifejackets, and protective gear. Always wear a helmet when you ride a bike or motorcycle, go skiing, or play sports that could cause a head injury. Wear protective equipment when you play sports. Wear a lifejacket when you are on a boat or doing water sports.     © Copyright Arti Morales 2023 Information is for End User's use only and may not be sold, redistributed or otherwise used for commercial purposes. The above information is an  only. It is not intended as medical advice for individual conditions or treatments. Talk to your doctor, nurse or pharmacist before following any medical regimen to see if it is safe and effective for you.

## 2023-10-31 NOTE — PROGRESS NOTES
ADULT ANNUAL [facility]    NAME: Carlos Isaacs  AGE: 26 y.o. SEX: female  : 2000     DATE: 10/31/2023     Assessment and Plan:   Fasting lab orders placed, to be done earliest convenience. Influenza vaccine declined. Advised about COVID booster. Continue regular follow-ups with your psychiatrist, list of psychiatrists provided as patient is looking for new one. Follow-up with your gynecologist for annual exams and Paps. Return in 1 year for annual physical, sooner if needed. Problem List Items Addressed This Visit    None  Visit Diagnoses     Annual physical exam    -  Primary    Relevant Orders    CBC and differential    Comprehensive metabolic panel    Screening for lipid disorders        Relevant Orders    Lipid panel          Immunizations and preventive care screenings were discussed with patient today. Appropriate education was printed on patient's after visit summary. Counseling:  Alcohol/drug use: discussed moderation in alcohol intake, the recommendations for healthy alcohol use, and avoidance of illicit drug use. Dental Health: discussed importance of regular tooth brushing, flossing, and dental visits. Exercise: the importance of regular exercise/physical activity was discussed. Recommend exercise 3-5 times per week for at least 30 minutes. BMI Counseling: Body mass index is 32.07 kg/m². The BMI is above normal. Nutrition recommendations include encouraging healthy choices of fruits and vegetables and increasing intake of lean protein. Exercise recommendations include moderate physical activity 150 minutes/week and exercising 3-5 times per week. No pharmacotherapy was ordered. Rationale for BMI follow-up plan is due to patient being overweight or obese. Denies smoking, former smoker, currently vaping and is actively trying to quit, denies etoh and drug use. Return in 1 year (on 10/31/2024).      Chief Complaint:     Chief Complaint   Patient presents with   • Annual Exam      History of Present Illness:     Adult Annual Physical   Patient here for a comprehensive physical exam.   She is doing well overall. 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 The patient reports no problems. Diet and Physical Activity  Diet/Nutrition: well balanced diet. Exercise: 5-7 times a week on average. Home work outs. Depression Screening  PHQ-2/9 Depression Screening         General Health  Sleep: sleeps well. History of insomnia, tolerant to sleep aids as her mother gave her them as a child. Was using medical marijuana but is no longer. Uses promethazine as needed. Follows with psychiatry. Hearing: normal - bilateral.  Vision: goes for regular eye exams and wears glasses. Dental: regular dental visits. /GYN Health  Last menstrual period: 10/19/23  Contraceptive method:  none . History of STDs?: no.       Review of Systems:     Review of Systems   Constitutional: Negative. HENT: Negative. Eyes: Negative. Respiratory: Negative. Cardiovascular: Negative. Gastrointestinal: Negative. Endocrine: Negative. Genitourinary: Negative. Musculoskeletal: Negative. Skin: Negative. Allergic/Immunologic: Negative. Neurological: Negative. Hematological: Negative. Psychiatric/Behavioral: Negative.         Past Medical History:     Past Medical History:   Diagnosis Date   • Allergic     pollen; animal dander   • Anemia    • Anxiety    • Asthma    • Closed left ankle fracture    • Depression    • Dysmenorrhea    • Influenza A 2/28/2020   • Insomnia    • Moderate single current episode of major depressive disorder ([address]) 1/5/2017      Past Surgical History:     Past Surgical History:   Procedure Laterality Date   • ANKLE SURGERY Left 2014    two screws      Social History:     Social History     Socioeconomic History   • Marital status: Single     Spouse name: None   • Number of children: 0   • Years of education: None   • Highest education level: None   Occupational History   • Occupation: [employer]     Comment: [employer] to full time as of the end of 2021   Tobacco Use   • Smoking status: Former     Packs/day: 0.50     Years: 7.00     Total pack years: 3.50     Types: Cigarettes     Quit date: 2021     Years since quittin.7   • Smokeless tobacco: Never   Vaping Use   • Vaping Use: Every day   • Substances: Nicotine   Substance and Sexual Activity   • Alcohol use: Not Currently     Comment: 1 glass of wine approx 1-2x weekly. Pt denies h/o abuse   • Drug use: Not Currently     Types: Marijuana     Comment: medical marijuana   • Sexual activity: Not Currently     Partners: Male   Other Topics Concern   • None   Social History Narrative    Coffee: denied     Drinks caffeinated tea    Ingests cola containing caffeine: denied        Home: Lived in a shared apt with live in Boyfriend, but they broke up and she moved in with a friend        Education:    Pt denies any h/o learning disabilities but went through an "Extra year in --due to behavioral issue". She took advanced placement classes while still in HS. She reached childhood milestones on time per her father Kristin Balderas.       Graduated HS 2020    No college     Social Determinants of Health     Financial Resource Strain: Not on file   Food Insecurity: Not on file   Transportation Needs: Not on file   Physical Activity: Not on file   Stress: Not on file   Social Connections: Not on file   Intimate Partner Violence: Not on file   Housing Stability: Not on file      Family History:     Family History   Problem Relation Age of Onset   • Bipolar disorder Mother         Fentanyl per Pt   • Drug abuse Mother    • No Known Problems Father    • Anxiety disorder Sister    • Bipolar disorder Sister    • Leukemia Paternal Aunt    • Autism spectrum disorder Cousin • Bipolar disorder Half-Sister    • Breast cancer Neg Hx    • Ovarian cancer Neg Hx       Current Medications:     Current Outpatient Medications   Medication Sig Dispense Refill   • acetaminophen (TYLENOL) 325 mg tablet Take 3 tablets (975 mg total) by mouth 3 (three) times a day as needed for mild pain 30 tablet 0   • Ferrous Sulfate (IRON) 325 (65 Fe) MG TABS Take 1 tablet by mouth daily     • Loratadine (CLARITIN PO) Take by mouth daily     • promethazine (PHENERGAN) 25 mg suppository Insert 12.5 mg into the rectum every 6 (six) hours as needed for nausea or vomiting     • VITAMIN D PO Take by mouth       No current facility-administered medications for this visit. Allergies:     No Known Allergies   Physical Exam:     /68 (BP Location: Left arm, Patient Position: Sitting, Cuff Size: Large)   Pulse 97   Temp 98.6 °F (37 °C)   Resp 18   Ht 5' 8.5" (1.74 m)   Wt 97.1 kg (214 lb)   LMP 09/19/2023 (Exact Date)   SpO2 98%   BMI 32.07 kg/m²     Physical Exam  Vitals and nursing note reviewed. Constitutional:       General: She is not in acute distress. Appearance: Normal appearance. She is obese. She is not ill-appearing. HENT:      Head: Normocephalic and atraumatic. Right Ear: Tympanic membrane, ear canal and external ear normal.      Left Ear: Tympanic membrane, ear canal and external ear normal.      Nose: Nose normal.      Mouth/Throat:      Mouth: Mucous membranes are moist.      Pharynx: Oropharynx is clear. Eyes:      General:         Right eye: No discharge. Left eye: No discharge. Conjunctiva/sclera: Conjunctivae normal.      Pupils: Pupils are equal, round, and reactive to light. Cardiovascular:      Rate and Rhythm: Normal rate and regular rhythm. Pulses: Normal pulses. Heart sounds: Normal heart sounds. Pulmonary:      Effort: Pulmonary effort is normal.      Breath sounds: Normal breath sounds. Abdominal:      General: Abdomen is flat. Bowel sounds are normal.      Palpations: Abdomen is soft. Tenderness: There is no abdominal tenderness. Musculoskeletal:         General: Normal range of motion. Cervical back: Normal range of motion and neck supple. Skin:     General: Skin is warm and dry. Neurological:      General: No focal deficit present. Mental Status: She is alert and oriented to person, place, and time. Mental status is at baseline. Psychiatric:         Mood and Affect: Mood normal.         Behavior: Behavior normal.         Thought Content:  Thought content normal.          Frances Staff, MAIKOL   [facility]

## 2023-11-24 ENCOUNTER — APPOINTMENT (OUTPATIENT)
Dept: LAB | Facility: MEDICAL CENTER | Age: 23
End: 2023-11-24
Payer: COMMERCIAL

## 2023-11-24 DIAGNOSIS — Z13.220 SCREENING FOR LIPID DISORDERS: ICD-10-CM

## 2023-11-24 DIAGNOSIS — Z00.00 ANNUAL PHYSICAL EXAM: ICD-10-CM

## 2023-11-24 LAB
ALBUMIN SERPL BCP-MCNC: 4.4 G/DL (ref 3.5–5)
ALP SERPL-CCNC: 54 U/L (ref 34–104)
ALT SERPL W P-5'-P-CCNC: 29 U/L (ref 7–52)
ANION GAP SERPL CALCULATED.3IONS-SCNC: 6 MMOL/L
AST SERPL W P-5'-P-CCNC: 18 U/L (ref 13–39)
BASOPHILS # BLD AUTO: 0.03 THOUSANDS/ÂΜL (ref 0–0.1)
BASOPHILS NFR BLD AUTO: 0 % (ref 0–1)
BILIRUB SERPL-MCNC: 0.46 MG/DL (ref 0.2–1)
BUN SERPL-MCNC: 8 MG/DL (ref 5–25)
CALCIUM SERPL-MCNC: 9 MG/DL (ref 8.4–10.2)
CHLORIDE SERPL-SCNC: 105 MMOL/L (ref 96–108)
CHOLEST SERPL-MCNC: 161 MG/DL
CO2 SERPL-SCNC: 29 MMOL/L (ref 21–32)
CREAT SERPL-MCNC: 0.54 MG/DL (ref 0.6–1.3)
EOSINOPHIL # BLD AUTO: 0.2 THOUSAND/ÂΜL (ref 0–0.61)
EOSINOPHIL NFR BLD AUTO: 3 % (ref 0–6)
ERYTHROCYTE [DISTWIDTH] IN BLOOD BY AUTOMATED COUNT: 13 % (ref 11.6–15.1)
GFR SERPL CREATININE-BSD FRML MDRD: 134 ML/MIN/1.73SQ M
GLUCOSE P FAST SERPL-MCNC: 82 MG/DL (ref 65–99)
HCT VFR BLD AUTO: 41.6 % (ref 34.8–46.1)
HDLC SERPL-MCNC: 52 MG/DL
HGB BLD-MCNC: 13.4 G/DL (ref 11.5–15.4)
IMM GRANULOCYTES # BLD AUTO: 0.02 THOUSAND/UL (ref 0–0.2)
IMM GRANULOCYTES NFR BLD AUTO: 0 % (ref 0–2)
LDLC SERPL CALC-MCNC: 91 MG/DL (ref 0–100)
LYMPHOCYTES # BLD AUTO: 1.65 THOUSANDS/ÂΜL (ref 0.6–4.47)
LYMPHOCYTES NFR BLD AUTO: 24 % (ref 14–44)
MCH RBC QN AUTO: 28.6 PG (ref 26.8–34.3)
MCHC RBC AUTO-ENTMCNC: 32.2 G/DL (ref 31.4–37.4)
MCV RBC AUTO: 89 FL (ref 82–98)
MONOCYTES # BLD AUTO: 0.52 THOUSAND/ÂΜL (ref 0.17–1.22)
MONOCYTES NFR BLD AUTO: 8 % (ref 4–12)
NEUTROPHILS # BLD AUTO: 4.53 THOUSANDS/ÂΜL (ref 1.85–7.62)
NEUTS SEG NFR BLD AUTO: 65 % (ref 43–75)
NONHDLC SERPL-MCNC: 109 MG/DL
NRBC BLD AUTO-RTO: 0 /100 WBCS
PLATELET # BLD AUTO: 325 THOUSANDS/UL (ref 149–390)
PMV BLD AUTO: 10.4 FL (ref 8.9–12.7)
POTASSIUM SERPL-SCNC: 3.8 MMOL/L (ref 3.5–5.3)
PROT SERPL-MCNC: 7.2 G/DL (ref 6.4–8.4)
RBC # BLD AUTO: 4.69 MILLION/UL (ref 3.81–5.12)
SODIUM SERPL-SCNC: 140 MMOL/L (ref 135–147)
TRIGL SERPL-MCNC: 90 MG/DL
WBC # BLD AUTO: 6.95 THOUSAND/UL (ref 4.31–10.16)

## 2023-11-24 PROCEDURE — 80061 LIPID PANEL: CPT

## 2023-11-24 PROCEDURE — 36415 COLL VENOUS BLD VENIPUNCTURE: CPT

## 2023-11-24 PROCEDURE — 85025 COMPLETE CBC W/AUTO DIFF WBC: CPT

## 2023-11-24 PROCEDURE — 80053 COMPREHEN METABOLIC PANEL: CPT

## 2023-11-27 ENCOUNTER — DOCUMENTATION (OUTPATIENT)
Dept: PSYCHIATRY | Facility: CLINIC | Age: 23
End: 2023-11-27

## 2023-11-27 ENCOUNTER — TELEPHONE (OUTPATIENT)
Dept: PSYCHIATRY | Facility: CLINIC | Age: 23
End: 2023-11-27

## 2023-11-27 NOTE — TELEPHONE ENCOUNTER
DISCHARGE LETTER for Singh Dominguez PA-C (certified and regular) placed in outgoing mail on 11/27/23.     Article #:  [id number]    Address:  [address] [address],[address] [facility] [id number]
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
## 2023-12-04 ENCOUNTER — TELEPHONE (OUTPATIENT)
Age: 23
End: 2023-12-04

## 2023-12-04 NOTE — TELEPHONE ENCOUNTER
Dillan Alexis was calling in to have someone go over her most recent lab results with her. I was unable to reach [facility].      Dillan Alexis added that she does start work at [employer], and if we do call to please have it be 1 an hour before 2:30pm.     She can be reached at [phone]    Please advise, thank you

## 2023-12-05 NOTE — TELEPHONE ENCOUNTER
Pt called back.  Our call was disconnected eventually  Tried to call in office, could not reach clinical or clerical.  Pt asked if a nurse can contact her to go over her labs she has she noticed something with her  Creatinine levels changing since 2020

## 2023-12-05 NOTE — TELEPHONE ENCOUNTER
Left detailed message for patient. All labs were normal.  Message was also left on patient vm 11/28/23.

## 2023-12-05 NOTE — TELEPHONE ENCOUNTER
Pt returned our call. Providers result note given. Pt has concerns about her creatinine level. Pt has an MRI in 2021 and they saw a cyst at that time. She was advised to watch her kidneys. Pt wants to be sure the cyst is not growing and/or effecting her kidney. Please advise pt.

## 2023-12-05 NOTE — TELEPHONE ENCOUNTER
Creatinine levels were slightly low, more  worrisome for acute kidney injury if creatinine levels are elevated. Finding on kidney in 2021 imaging did not recommend any further workup.

## 2023-12-07 NOTE — TELEPHONE ENCOUNTER
Patient return call and would like an explanation as to why her creatinine level drops and is "slightly low" . Patient needs to understand why this level was stable and now dropping. Patient is available today and tomorrow morning only if PCP has time to call to discuss.

## 2023-12-07 NOTE — TELEPHONE ENCOUNTER
The most common cause of decreased creatinine level is decreased muscle mass. Recommendation is regular exercise and incorporate strength training into exercise. Again, more of a concern with elevation in creatinine level.

## 2023-12-13 NOTE — PROGRESS NOTES
Assessment/Plan:       Diagnoses and all orders for this visit:    Tonsillitis  -     amoxicillin (AMOXIL) 500 mg capsule; Take 1 capsule (500 mg total) by mouth every 12 (twelve) hours for 10 days    Bilateral tonsillitis  Suspect secondary to strep  Start amoxicillin 500 mg twice daily for 10 days  Follow-up in one week if symptoms persist or sooner if needed  Subjective:      Patient ID: Rozina Dawkins is a 26 y o  female  Patient has a sore throat that started 5-6 days ago  She thought it was allergy related  This morning however she noticed white spots on the back of her throat and some blood  Hurts to swallow  Has a full feeling in her throat  No trouble breathing  No trouble swallowing  No fevers  The following portions of the patient's history were reviewed and updated as appropriate: She  has a past medical history of Allergic, Anemia, Anxiety, Asthma, Closed left ankle fracture, Depression, Dysmenorrhea, Insomnia, and Moderate single current episode of major depressive disorder ([facility] [address] ) (1/5/2017)  She   Patient Active Problem List    Diagnosis Date Noted    Environmental and seasonal allergies 02/07/2020    Concussion with no loss of consciousness 12/09/2019    Encounter for gynecological examination (general) (routine) without abnormal findings 05/02/2019    Encounter for Depo-Provera contraception 10/25/2018    Generalized anxiety disorder 03/24/2017    Social anxiety disorder 01/05/2017    Dysmenorrhea 12/07/2016    Iron deficiency 12/06/2016    Contact dermatitis and other eczema, due to unspecified cause 09/16/2014     She  has a past surgical history that includes Ankle surgery (Left, 2014)  Her family history includes Anxiety disorder in her sister; Autism spectrum disorder in her cousin; Bipolar disorder in her mother; Depression in her mother and sister; Leukemia in her paternal aunt  She  reports that she is a non-smoker but has been exposed to tobacco smoke  She has never used smokeless tobacco  She reports that she does not drink alcohol or use drugs  Current Outpatient Medications   Medication Sig Dispense Refill    albuterol (PROVENTIL HFA,VENTOLIN HFA) 90 mcg/act inhaler Inhale 2 puffs every 6 (six) hours as needed for wheezing or shortness of breath 1 Inhaler 1    Cholecalciferol ([employer] VITAMIN D3) 1000 units capsule Take by mouth      Ferrous Sulfate (IRON) 325 (65 Fe) MG TABS Take by mouth      Loratadine (CLARITIN PO) Take by mouth daily      medroxyPROGESTERone (DEPO-PROVERA) 150 mg/mL injection Inject every 3 monthes 1 mL 4    montelukast (SINGULAIR) 10 mg tablet Take 1 tablet (10 mg total) by mouth daily at bedtime 30 tablet 1    amoxicillin (AMOXIL) 500 mg capsule Take 1 capsule (500 mg total) by mouth every 12 (twelve) hours for 10 days 20 capsule 0     Current Facility-Administered Medications   Medication Dose Route Frequency Provider Last Rate Last Dose    ibuprofen (MOTRIN) tablet 400 mg  400 mg Oral Once MAIKOL Vaughn        medroxyPROGESTERone (DEPO-PROVERA) IM injection 150 mg  150 mg Intramuscular Q3 Months Tara Deshpande PA-C   150 mg at 01/13/20 1150     Current Outpatient Medications on File Prior to Visit   Medication Sig    albuterol (PROVENTIL HFA,VENTOLIN HFA) 90 mcg/act inhaler Inhale 2 puffs every 6 (six) hours as needed for wheezing or shortness of breath    Cholecalciferol ([employer] VITAMIN D3) 1000 units capsule Take by mouth    Ferrous Sulfate (IRON) 325 (65 Fe) MG TABS Take by mouth    Loratadine (CLARITIN PO) Take by mouth daily    medroxyPROGESTERone (DEPO-PROVERA) 150 mg/mL injection Inject every 3 monthes    montelukast (SINGULAIR) 10 mg tablet Take 1 tablet (10 mg total) by mouth daily at bedtime     Current Facility-Administered Medications on File Prior to Visit   Medication    ibuprofen (MOTRIN) tablet 400 mg    medroxyPROGESTERone (DEPO-PROVERA) IM injection 150 mg     She has No Known Allergies       Review of Systems   Constitutional: Negative for fever  Respiratory: Negative for shortness of breath  Cardiovascular: Negative for chest pain  Objective:      /78 (BP Location: Left arm, Patient Position: Sitting, Cuff Size: Large)   Pulse 88   Temp 98 5 °F (36 9 °C)   Ht 5' 9" (1 753 m)   Wt 91 2 kg (201 lb)   BMI 29 68 kg/m²          Physical Exam   Constitutional: She appears well-developed and well-nourished  HENT:   Right Ear: Hearing, tympanic membrane, external ear and ear canal normal    Left Ear: Hearing, tympanic membrane, external ear and ear canal normal    Mouth/Throat: Uvula is midline  Oropharyngeal exudate and posterior oropharyngeal erythema present  Tonsils are 2+ on the right  Tonsils are 2+ on the left  Tonsillar exudate  Lymphadenopathy:     She has cervical adenopathy  Right cervical: Superficial cervical adenopathy present  No deep cervical and no posterior cervical adenopathy present  Left cervical: Superficial cervical adenopathy present  No deep cervical and no posterior cervical adenopathy present  Alert and oriented to person, place and time

## 2023-12-31 ENCOUNTER — OFFICE VISIT (OUTPATIENT)
Dept: URGENT CARE | Facility: MEDICAL CENTER | Age: 23
End: 2023-12-31
Payer: COMMERCIAL

## 2023-12-31 VITALS
SYSTOLIC BLOOD PRESSURE: 122 MMHG | RESPIRATION RATE: 18 BRPM | TEMPERATURE: 98.7 F | HEART RATE: 84 BPM | DIASTOLIC BLOOD PRESSURE: 68 MMHG

## 2023-12-31 DIAGNOSIS — J02.9 ACUTE PHARYNGITIS, UNSPECIFIED ETIOLOGY: Primary | ICD-10-CM

## 2023-12-31 DIAGNOSIS — J06.9 ACUTE URI: ICD-10-CM

## 2023-12-31 LAB
S PYO AG THROAT QL: NEGATIVE
SARS-COV-2 AG UPPER RESP QL IA: NEGATIVE
VALID CONTROL: NORMAL

## 2023-12-31 PROCEDURE — 99213 OFFICE O/P EST LOW 20 MIN: CPT | Performed by: PHYSICIAN ASSISTANT

## 2023-12-31 PROCEDURE — S9088 SERVICES PROVIDED IN URGENT: HCPCS | Performed by: PHYSICIAN ASSISTANT

## 2023-12-31 PROCEDURE — 87070 CULTURE OTHR SPECIMN AEROBIC: CPT | Performed by: PHYSICIAN ASSISTANT

## 2023-12-31 PROCEDURE — 87811 SARS-COV-2 COVID19 W/OPTIC: CPT | Performed by: PHYSICIAN ASSISTANT

## 2023-12-31 PROCEDURE — 87880 STREP A ASSAY W/OPTIC: CPT | Performed by: PHYSICIAN ASSISTANT

## 2023-12-31 NOTE — PROGRESS NOTES
[facility]        NAME: Susana Young is a 23 y.o. female  : 2000    MRN: [id number]  DATE: 2023  TIME: 3:47 PM    Assessment and Plan   Acute pharyngitis, unspecified etiology [J02.9]  1. Acute pharyngitis, unspecified etiology        2. Acute URI              Patient Instructions     Pharyngitis  Salt water gargles  Follow up with PCP in 3-5 days.  Proceed to  ER if symptoms worsen.    Chief Complaint   No chief complaint on file.        History of Present Illness       23-year-old female who presents complaining of sore throat, pain on swallowing, congestion x 3 days.  Patient denies fevers, chills, nausea, vomiting, chest pain, shortness of breath.  Has been taking over-the-counter medications with no relief.        Review of Systems   Review of Systems   Constitutional:  Negative for activity change, appetite change, chills, diaphoresis, fatigue and fever.   HENT:  Positive for congestion, ear pain, rhinorrhea and sore throat. Negative for ear discharge, facial swelling, hearing loss, mouth sores, nosebleeds, postnasal drip, sinus pressure, sinus pain, sneezing and voice change.    Respiratory:  Positive for cough. Negative for apnea, choking, chest tightness, shortness of breath, wheezing and stridor.    Cardiovascular: Negative.          Current Medications       Current Outpatient Medications:     acetaminophen (TYLENOL) 325 mg tablet, Take 3 tablets (975 mg total) by mouth 3 (three) times a day as needed for mild pain, Disp: 30 tablet, Rfl: 0    Ferrous Sulfate (IRON) 325 (65 Fe) MG TABS, Take 1 tablet by mouth daily, Disp: , Rfl:     Loratadine (CLARITIN PO), Take by mouth daily, Disp: , Rfl:     promethazine (PHENERGAN) 25 mg suppository, Insert 12.5 mg into the rectum every 6 (six) hours as needed for nausea or vomiting, Disp: , Rfl:     VITAMIN D PO, Take by mouth, Disp: , Rfl:     Current Allergies     Allergies as of 2023    (No Known Allergies)            The  following portions of the patient's history were reviewed and updated as appropriate: allergies, current medications, past family history, past medical history, past social history, past surgical history and problem list.     Past Medical History:   Diagnosis Date    Allergic     pollen; animal dander    Anemia     Anxiety     Asthma     Closed left ankle fracture     Depression     Dysmenorrhea     Influenza A 2/28/2020    Insomnia     Moderate single current episode of major depressive disorder (HCC) 1/5/2017       Past Surgical History:   Procedure Laterality Date    ANKLE SURGERY Left 2014    two screws       Family History   Problem Relation Age of Onset    Bipolar disorder Mother         Fentanyl per Pt    Drug abuse Mother     No Known Problems Father     Anxiety disorder Sister     Bipolar disorder Sister     Leukemia Paternal Aunt     Autism spectrum disorder Cousin     Bipolar disorder Half-Sister     Breast cancer Neg Hx     Ovarian cancer Neg Hx          Medications have been verified.        Objective   /68   Pulse 84   Temp 98.7 °F (37.1 °C)   Resp 18        Physical Exam     Physical Exam  Constitutional:       General: She is not in acute distress.     Appearance: Normal appearance. She is well-developed. She is not diaphoretic.   HENT:      Head: Normocephalic and atraumatic.      Right Ear: Hearing, tympanic membrane, ear canal and external ear normal.      Left Ear: Hearing, tympanic membrane, ear canal and external ear normal.      Nose: Rhinorrhea present.      Mouth/Throat:      Pharynx: Uvula midline.   Cardiovascular:      Rate and Rhythm: Normal rate and regular rhythm.      Pulses: Normal pulses.      Heart sounds: Normal heart sounds.   Pulmonary:      Effort: Pulmonary effort is normal.      Breath sounds: Normal breath sounds.   Musculoskeletal:      Cervical back: Normal range of motion and neck supple.   Lymphadenopathy:      Cervical: Cervical adenopathy present.    Neurological:      Mental Status: She is alert.

## 2024-01-03 LAB — BACTERIA THROAT CULT: NORMAL

## 2024-02-12 ENCOUNTER — OFFICE VISIT (OUTPATIENT)
Dept: URGENT CARE | Facility: CLINIC | Age: 24
End: 2024-02-12

## 2024-02-12 ENCOUNTER — TELEPHONE (OUTPATIENT)
Age: 24
End: 2024-02-12

## 2024-02-12 VITALS — TEMPERATURE: 98.1 F | HEART RATE: 91 BPM | OXYGEN SATURATION: 99 %

## 2024-02-12 DIAGNOSIS — J02.9 ACUTE PHARYNGITIS, UNSPECIFIED ETIOLOGY: Primary | ICD-10-CM

## 2024-02-12 LAB — S PYO AG THROAT QL: NEGATIVE

## 2024-02-12 PROCEDURE — 87880 STREP A ASSAY W/OPTIC: CPT | Performed by: NURSE PRACTITIONER

## 2024-02-12 PROCEDURE — 99213 OFFICE O/P EST LOW 20 MIN: CPT | Performed by: NURSE PRACTITIONER

## 2024-02-12 PROCEDURE — 87070 CULTURE OTHR SPECIMN AEROBIC: CPT | Performed by: NURSE PRACTITIONER

## 2024-02-12 NOTE — PROGRESS NOTES
[facility]        NAME: Susana Young is a 23 y.o. female  : 2000    MRN: [id number]  DATE: 2024  TIME: 5:05 PM    Assessment and Plan   Acute pharyngitis, unspecified etiology [J02.9]  1. Acute pharyngitis, unspecified etiology  POCT rapid ANTIGEN strepA    Throat culture        Rapid strep in the office is negative.  Will send for throat culture.  On exam there is very minimal posterior pharynx swelling.  No erythema.  Tonsils appear normal with no exudate.  Slight right serous effusion.  No fevers on exam.  She is tolerating p.o. intake.  Recommended over-the-counter medications for treatment.  Offered to prescribe oral steroids however states she is not able to take them due to her anxiety.  Patient Instructions   Rapid strep: negative  Tylenol/Motrin as needed for pain/fever   Increase fluid intake   Throat lozenges, honey, salt water gargles for throat discomfort   Follow up with your PCP for worsening or concerning symptoms    Follow up with PCP in 3-5 days.  Proceed to  ER if symptoms worsen.    Chief Complaint     Chief Complaint   Patient presents with    Sore Throat     Pt reports that she is having a sore throat and she just started a cough while being here. She reports that she is not sure if it is strep or tonsillitis.         History of Present Illness       Patient is a 23-year-old female presenting with rhinorrhea, sore throat, headache, and diarrhea that started last night.  She feels that her throat is swollen.  She took Tylenol for discomfort.  She is tolerating p.o. intake.  She is concerned because she states the last time she had a sore throat she ended up passing out in the shower.    Sore Throat   Associated symptoms include headaches. Pertinent negatives include no congestion, coughing, ear pain or trouble swallowing.       Review of Systems   Review of Systems   Constitutional:  Negative for activity change, chills and fever.   HENT:  Positive for postnasal  drip and sore throat. Negative for congestion, ear pain and trouble swallowing.    Respiratory:  Negative for cough.    Neurological:  Positive for headaches.         Current Medications       Current Outpatient Medications:     promethazine (PHENERGAN) 25 mg suppository, Insert 12.5 mg into the rectum every 6 (six) hours as needed for nausea or vomiting, Disp: , Rfl:     acetaminophen (TYLENOL) 325 mg tablet, Take 3 tablets (975 mg total) by mouth 3 (three) times a day as needed for mild pain, Disp: 30 tablet, Rfl: 0    Ferrous Sulfate (IRON) 325 (65 Fe) MG TABS, Take 1 tablet by mouth daily, Disp: , Rfl:     Loratadine (CLARITIN PO), Take by mouth daily, Disp: , Rfl:     VITAMIN D PO, Take by mouth, Disp: , Rfl:     Current Allergies     Allergies as of 02/12/2024    (No Known Allergies)            The following portions of the patient's history were reviewed and updated as appropriate: allergies, current medications, past family history, past medical history, past social history, past surgical history and problem list.     Past Medical History:   Diagnosis Date    Allergic     pollen; animal dander    Anemia     Anxiety     Asthma     Closed left ankle fracture     Depression     Dysmenorrhea     Influenza A 2/28/2020    Insomnia     Moderate single current episode of major depressive disorder (HCC) 1/5/2017       Past Surgical History:   Procedure Laterality Date    ANKLE SURGERY Left 2014    two screws       Family History   Problem Relation Age of Onset    Bipolar disorder Mother         Fentanyl per Pt    Drug abuse Mother     No Known Problems Father     Anxiety disorder Sister     Bipolar disorder Sister     Leukemia Paternal Aunt     Autism spectrum disorder Cousin     Bipolar disorder Half-Sister     Breast cancer Neg Hx     Ovarian cancer Neg Hx          Medications have been verified.        Objective   Pulse 91   Temp 98.1 °F (36.7 °C)   SpO2 99%        Physical Exam     Physical Exam  Vitals  reviewed.   Constitutional:       General: She is awake. She is not in acute distress.     Appearance: Normal appearance. She is well-developed.   HENT:      Head: Normocephalic.      Right Ear: Hearing and ear canal normal. A middle ear effusion is present.      Left Ear: Hearing, tympanic membrane and ear canal normal.      Nose: Nose normal.      Mouth/Throat:      Lips: Pink.      Pharynx: Oropharynx is clear.   Cardiovascular:      Rate and Rhythm: Normal rate and regular rhythm.      Heart sounds: Normal heart sounds, S1 normal and S2 normal.   Pulmonary:      Effort: Pulmonary effort is normal.      Breath sounds: Normal breath sounds. No decreased breath sounds, wheezing or rhonchi.   Skin:     General: Skin is warm and moist.   Neurological:      General: No focal deficit present.      Mental Status: She is alert and oriented to person, place, and time.   Psychiatric:         Behavior: Behavior is cooperative.

## 2024-02-12 NOTE — PATIENT INSTRUCTIONS
Rapid strep: negative  Tylenol/Motrin as needed for pain/fever   Increase fluid intake   Throat lozenges, honey, salt water gargles for throat discomfort   Follow up with your PCP for worsening or concerning symptoms

## 2024-02-12 NOTE — TELEPHONE ENCOUNTER
Pt called to schedule an appt. Pt could not take appt offered due to work. I offered to call the office to check on overbooks but pt declined. She said she will just go to to u/c.

## 2024-02-14 LAB — BACTERIA THROAT CULT: NORMAL

## 2024-02-21 PROBLEM — Z01.419 ENCOUNTER FOR GYNECOLOGICAL EXAMINATION (GENERAL) (ROUTINE) WITHOUT ABNORMAL FINDINGS: Status: RESOLVED | Noted: 2019-05-02 | Resolved: 2024-02-21

## 2024-04-16 ENCOUNTER — OFFICE VISIT (OUTPATIENT)
Dept: FAMILY MEDICINE CLINIC | Facility: MEDICAL CENTER | Age: 24
End: 2024-04-16
Payer: COMMERCIAL

## 2024-04-16 VITALS
DIASTOLIC BLOOD PRESSURE: 76 MMHG | SYSTOLIC BLOOD PRESSURE: 122 MMHG | TEMPERATURE: 99.3 F | BODY MASS INDEX: 33.77 KG/M2 | HEART RATE: 89 BPM | OXYGEN SATURATION: 98 % | RESPIRATION RATE: 18 BRPM | HEIGHT: 68 IN | WEIGHT: 222.8 LBS

## 2024-04-16 DIAGNOSIS — Z11.59 NEED FOR HEPATITIS C SCREENING TEST: ICD-10-CM

## 2024-04-16 DIAGNOSIS — Z11.59 NEED FOR HEPATITIS B SCREENING TEST: ICD-10-CM

## 2024-04-16 DIAGNOSIS — Z00.00 PREVENTATIVE HEALTH CARE: Primary | ICD-10-CM

## 2024-04-16 DIAGNOSIS — Z11.1 SCREENING-PULMONARY TB: ICD-10-CM

## 2024-04-16 PROCEDURE — 99395 PREV VISIT EST AGE 18-39: CPT | Performed by: FAMILY MEDICINE

## 2024-04-16 NOTE — PROGRESS NOTES
ADULT ANNUAL PHYSICAL  [facility]    NAME: Susana Young  AGE: 23 y.o. SEX: female  : 2000     DATE: 2024     Assessment and Plan:     Problem List Items Addressed This Visit    None  Visit Diagnoses     Preventative health care    -  Primary    Screening-pulmonary TB        Relevant Orders    Quantiferon TB Gold Plus Assay    Need for hepatitis C screening test        Relevant Orders    Hepatitis C Ab W/Refl To HCV RNA, Qn, PCR    Need for hepatitis B screening test        Relevant Orders    Hepatitis B surface antibody          Immunizations and preventive care screenings were discussed with patient today. Appropriate education was printed on patient's after visit summary.    Counseling:  Exercise: the importance of regular exercise/physical activity was discussed. Recommend exercise 3-5 times per week for at least 30 minutes.          No follow-ups on file.     Chief Complaint:     Chief Complaint   Patient presents with   • Physical Exam     Physical for N program NCC.      History of Present Illness:     Adult Annual Physical   Patient here for a comprehensive physical exam. The patient reports no problems.    This is a very pleasant 23-year-old woman who is here for college physical.  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    Diet and Physical Activity  Diet/Nutrition: well balanced diet.   Exercise: walking.      Depression Screening  PHQ-2/9 Depression Screening    Little interest or pleasure in doing things: 0 - not at all  Feeling down, depressed, or hopeless: 0 - not at all  PHQ-2 Score: 0  PHQ-2 Interpretation: Negative depression screen       General Health  Sleep: sleeps well.   Hearing: normal - bilateral.  Vision: no vision problems.   Dental: regular dental visits.         Review of Systems:     Review of Systems   Constitutional:  Negative for chills and fever.   HENT:  Negative for ear pain,  postnasal drip, rhinorrhea, sore throat and trouble swallowing.    Eyes:  Negative for pain, redness and visual disturbance.   Respiratory:  Negative for cough and shortness of breath.    Cardiovascular:  Negative for chest pain and palpitations.   Gastrointestinal:  Negative for abdominal pain, constipation, diarrhea and vomiting.   Endocrine: Negative for polydipsia, polyphagia and polyuria.   Genitourinary:  Negative for dysuria, frequency, hematuria and urgency.   Musculoskeletal:  Negative for arthralgias, back pain, joint swelling and myalgias.   Skin:  Negative for color change and rash.   Neurological:  Negative for seizures and syncope.   Hematological:  Negative for adenopathy.   Psychiatric/Behavioral:  Negative for decreased concentration, dysphoric mood and sleep disturbance. The patient is not nervous/anxious.    All other systems reviewed and are negative.     Past Medical History:     Past Medical History:   Diagnosis Date   • Allergic     pollen; animal dander   • Anemia    • Anxiety    • Closed left ankle fracture    • Dysmenorrhea    • Influenza A 02/28/2020   • Insomnia    • Moderate single current episode of major depressive disorder (HCC) 01/05/2017      Past Surgical History:     Past Surgical History:   Procedure Laterality Date   • ANKLE SURGERY Left 2014    two screws      Social History:     Social History     Socioeconomic History   • Marital status: /Civil Union     Spouse name: None   • Number of children: 0   • Years of education: None   • Highest education level: None   Occupational History   • Occupation: [employer]     Comment: Went to full time as of the end of 11/2021   Tobacco Use   • Smoking status: Former     Current packs/day: 0.00     Average packs/day: 0.5 packs/day for 7.0 years (3.5 ttl pk-yrs)     Types: Cigarettes     Start date: 1/19/2014     Quit date: 1/19/2021     Years since quitting: 3.2   • Smokeless tobacco: Never   Vaping Use   • Vaping  "status: Former   • Substances: Nicotine   Substance and Sexual Activity   • Alcohol use: Not Currently     Comment: 1 glass of wine approx 1-2x weekly.  Pt denies h/o abuse   • Drug use: Not Currently     Types: Marijuana     Comment: medical marijuana   • Sexual activity: Not Currently     Partners: Male   Other Topics Concern   • None   Social History Narrative    Coffee: denied     Drinks caffeinated tea    Ingests cola containing caffeine: denied        Home: Lived in a shared apt with live in Boyfriend, but they broke up and she moved in with a friend        Education:    Pt denies any h/o learning disabilities but went through an \"Extra year in --due to behavioral issue\".  She took advanced placement classes while still in HS.  She reached childhood milestones on time per her father Mp.      Graduated HS 5/2020    No college     Social Determinants of Health     Financial Resource Strain: Not on file   Food Insecurity: Not on file   Transportation Needs: Not on file   Physical Activity: Not on file   Stress: Not on file   Social Connections: Not on file   Intimate Partner Violence: Not on file   Housing Stability: Not on file      Family History:     Family History   Problem Relation Age of Onset   • Bipolar disorder Mother         Fentanyl per Pt   • Drug abuse Mother    • No Known Problems Father    • Anxiety disorder Sister    • Bipolar disorder Sister    • Leukemia Paternal Aunt    • Autism spectrum disorder Cousin    • Bipolar disorder Half-Sister    • Breast cancer Neg Hx    • Ovarian cancer Neg Hx       Current Medications:     Current Outpatient Medications   Medication Sig Dispense Refill   • acetaminophen (TYLENOL) 325 mg tablet Take 3 tablets (975 mg total) by mouth 3 (three) times a day as needed for mild pain 30 tablet 0   • Ferrous Sulfate (IRON) 325 (65 Fe) MG TABS Take 1 tablet by mouth daily     • Loratadine (CLARITIN PO) Take by mouth daily     • promethazine (PHENERGAN) 25 mg " "suppository Insert 12.5 mg into the rectum every 6 (six) hours as needed for nausea or vomiting     • VITAMIN D PO Take by mouth       No current facility-administered medications for this visit.      Allergies:     No Known Allergies   Physical Exam:     /76 (BP Location: Left arm, Patient Position: Sitting, Cuff Size: Standard)   Pulse 89   Temp 99.3 °F (37.4 °C) (Temporal)   Resp 18   Ht 5' 8\" (1.727 m)   Wt 101 kg (222 lb 12.8 oz)   SpO2 98%   BMI 33.88 kg/m²     Physical Exam  Vitals and nursing note reviewed.   Constitutional:       General: She is not in acute distress.     Appearance: Normal appearance. She is well-developed. She is not diaphoretic.   HENT:      Head: Normocephalic.      Right Ear: Tympanic membrane and ear canal normal.      Left Ear: Tympanic membrane and ear canal normal.      Nose: Nose normal. No mucosal edema, congestion or rhinorrhea.      Mouth/Throat:      Mouth: Mucous membranes are moist.      Pharynx: Uvula midline. No oropharyngeal exudate or posterior oropharyngeal erythema.      Tonsils: No tonsillar exudate.   Eyes:      General: Lids are normal.      Extraocular Movements: Extraocular movements intact.      Conjunctiva/sclera: Conjunctivae normal.      Pupils: Pupils are equal, round, and reactive to light.   Neck:      Thyroid: No thyromegaly.      Vascular: No carotid bruit.      Trachea: Trachea normal.   Cardiovascular:      Rate and Rhythm: Normal rate and regular rhythm.      Pulses: Normal pulses.      Heart sounds: Normal heart sounds, S1 normal and S2 normal. No murmur heard.  Pulmonary:      Effort: Pulmonary effort is normal. No respiratory distress.      Breath sounds: Normal breath sounds. No stridor. No wheezing, rhonchi or rales.   Musculoskeletal:         General: No swelling or deformity. Normal range of motion.      Cervical back: Normal range of motion.   Lymphadenopathy:      Cervical: No cervical adenopathy.   Skin:     General: Skin is warm " and dry.      Findings: No rash.   Neurological:      General: No focal deficit present.      Mental Status: She is alert and oriented to person, place, and time. Mental status is at baseline.      Cranial Nerves: No cranial nerve deficit.      Sensory: No sensory deficit.      Coordination: Coordination normal.      Deep Tendon Reflexes: Reflexes are normal and symmetric.   Psychiatric:         Mood and Affect: Mood normal.         Speech: Speech normal.         Behavior: Behavior normal. Behavior is cooperative.         Thought Content: Thought content normal.         Judgment: Judgment normal.          Meño Thornton MD   [facility]

## 2024-04-20 ENCOUNTER — APPOINTMENT (OUTPATIENT)
Dept: LAB | Facility: MEDICAL CENTER | Age: 24
End: 2024-04-20
Payer: COMMERCIAL

## 2024-04-20 DIAGNOSIS — Z11.59 NEED FOR HEPATITIS B SCREENING TEST: ICD-10-CM

## 2024-04-20 DIAGNOSIS — Z11.1 SCREENING-PULMONARY TB: ICD-10-CM

## 2024-04-20 DIAGNOSIS — Z11.59 NEED FOR HEPATITIS C SCREENING TEST: ICD-10-CM

## 2024-04-20 PROCEDURE — 86803 HEPATITIS C AB TEST: CPT

## 2024-04-20 PROCEDURE — 36415 COLL VENOUS BLD VENIPUNCTURE: CPT

## 2024-04-20 PROCEDURE — 86480 TB TEST CELL IMMUN MEASURE: CPT

## 2024-04-20 PROCEDURE — 86706 HEP B SURFACE ANTIBODY: CPT

## 2024-04-21 LAB — HBV SURFACE AB SER-ACNC: <3 MIU/ML

## 2024-04-22 LAB
GAMMA INTERFERON BACKGROUND BLD IA-ACNC: 0 IU/ML
HCV AB S/CO SERPL IA: NON REACTIVE
M TB IFN-G BLD-IMP: NEGATIVE
M TB IFN-G CD4+ BCKGRND COR BLD-ACNC: 0.02 IU/ML
M TB IFN-G CD4+ BCKGRND COR BLD-ACNC: 0.02 IU/ML
MITOGEN IGNF BCKGRD COR BLD-ACNC: 10 IU/ML
SL AMB INTERPRETATION: NORMAL

## 2024-04-26 ENCOUNTER — TELEPHONE (OUTPATIENT)
Dept: FAMILY MEDICINE CLINIC | Facility: MEDICAL CENTER | Age: 24
End: 2024-04-26

## 2024-04-26 NOTE — TELEPHONE ENCOUNTER
Pt's bw/titers are back and she is wondering if she needs to update any of her immunizations.  Pt also needs px paperwork filled out.  It's in your bin, I will get it started and give to you to sign then.

## 2024-04-29 ENCOUNTER — CLINICAL SUPPORT (OUTPATIENT)
Dept: FAMILY MEDICINE CLINIC | Facility: MEDICAL CENTER | Age: 24
End: 2024-04-29
Payer: COMMERCIAL

## 2024-04-29 DIAGNOSIS — Z23 ENCOUNTER FOR IMMUNIZATION: Primary | ICD-10-CM

## 2024-04-29 PROCEDURE — 90471 IMMUNIZATION ADMIN: CPT

## 2024-04-29 PROCEDURE — 90744 HEPB VACC 3 DOSE PED/ADOL IM: CPT

## 2024-04-29 NOTE — PROGRESS NOTES
Name: Susana Young      : 2000      MRN: [id number]  Encounter Provider: Nurse SARAN Almaraz  Encounter Date: 2024   Encounter department: [facility]    Assessment & Plan     1. Encounter for immunization  -     HEPATITIS B VACCINE PEDIATRIC / ADOLESCENT 3-DOSE IM    Pt will return in 4 weeks for Hep B#2       Subjective          Current Outpatient Medications on File Prior to Visit   Medication Sig    acetaminophen (TYLENOL) 325 mg tablet Take 3 tablets (975 mg total) by mouth 3 (three) times a day as needed for mild pain    Ferrous Sulfate (IRON) 325 (65 Fe) MG TABS Take 1 tablet by mouth daily    Loratadine (CLARITIN PO) Take by mouth daily    promethazine (PHENERGAN) 25 mg suppository Insert 12.5 mg into the rectum every 6 (six) hours as needed for nausea or vomiting    VITAMIN D PO Take by mouth       Objective     There were no vitals taken for this visit.      Nurse SARAN Almaraz

## 2024-05-30 ENCOUNTER — CLINICAL SUPPORT (OUTPATIENT)
Dept: FAMILY MEDICINE CLINIC | Facility: MEDICAL CENTER | Age: 24
End: 2024-05-30
Payer: COMMERCIAL

## 2024-05-30 DIAGNOSIS — Z11.3 SCREENING FOR STDS (SEXUALLY TRANSMITTED DISEASES): ICD-10-CM

## 2024-05-30 DIAGNOSIS — Z23 ENCOUNTER FOR IMMUNIZATION: ICD-10-CM

## 2024-05-30 DIAGNOSIS — Z11.1 SCREENING FOR TUBERCULOSIS: Primary | ICD-10-CM

## 2024-05-30 PROCEDURE — 90746 HEPB VACCINE 3 DOSE ADULT IM: CPT

## 2024-05-30 PROCEDURE — 90471 IMMUNIZATION ADMIN: CPT

## 2024-05-30 NOTE — PROGRESS NOTES
Ambulatory Visit  Name: Susana Young      : 2000      MRN: [id number]  Encounter Provider: Nurse SARAN Almaraz  Encounter Date: 2024   Encounter department: [facility]    Assessment & Plan   1. Screening for tuberculosis  2. Screening for STDs (sexually transmitted diseases)  3. Encounter for immunization       History of Present Illness         Objective     There were no vitals taken for this visit.      Administrative Statements

## 2024-06-15 ENCOUNTER — APPOINTMENT (OUTPATIENT)
Dept: LAB | Facility: MEDICAL CENTER | Age: 24
End: 2024-06-15
Payer: COMMERCIAL

## 2024-06-15 DIAGNOSIS — Z11.1 SCREENING FOR TUBERCULOSIS: ICD-10-CM

## 2024-06-15 PROCEDURE — 36415 COLL VENOUS BLD VENIPUNCTURE: CPT

## 2024-06-15 PROCEDURE — 86480 TB TEST CELL IMMUN MEASURE: CPT

## 2024-06-16 LAB
GAMMA INTERFERON BACKGROUND BLD IA-ACNC: 0 IU/ML
M TB IFN-G BLD-IMP: NEGATIVE
M TB IFN-G CD4+ BCKGRND COR BLD-ACNC: 0.03 IU/ML
M TB IFN-G CD4+ BCKGRND COR BLD-ACNC: 0.03 IU/ML
MITOGEN IGNF BCKGRD COR BLD-ACNC: 10 IU/ML

## 2024-08-26 ENCOUNTER — OFFICE VISIT (OUTPATIENT)
Dept: OBGYN CLINIC | Facility: MEDICAL CENTER | Age: 24
End: 2024-08-26
Payer: COMMERCIAL

## 2024-08-26 VITALS — WEIGHT: 231 LBS | BODY MASS INDEX: 35.12 KG/M2 | SYSTOLIC BLOOD PRESSURE: 122 MMHG | DIASTOLIC BLOOD PRESSURE: 82 MMHG

## 2024-08-26 DIAGNOSIS — N89.8 VAGINAL ODOR: Primary | ICD-10-CM

## 2024-08-26 LAB
BV WHIFF TEST VAG QL: NORMAL
CLUE CELLS SPEC QL WET PREP: NORMAL
SL AMB POCT WET MOUNT: NORMAL
T VAGINALIS VAG QL WET PREP: NORMAL
YEAST VAG QL WET PREP: NORMAL

## 2024-08-26 PROCEDURE — 87210 SMEAR WET MOUNT SALINE/INK: CPT | Performed by: STUDENT IN AN ORGANIZED HEALTH CARE EDUCATION/TRAINING PROGRAM

## 2024-08-26 PROCEDURE — 99213 OFFICE O/P EST LOW 20 MIN: CPT | Performed by: STUDENT IN AN ORGANIZED HEALTH CARE EDUCATION/TRAINING PROGRAM

## 2024-08-26 NOTE — PROGRESS NOTES
Ambulatory Visit  Name: Susana Young      : 2000      MRN: [id number]  Encounter Provider: Tena Mcbride DO  Encounter Date: 2024   Encounter department: [facility] [place]    Assessment & Plan   1. Vaginal odor  Assessment & Plan:  -wet mount negative   -discussed one episode of spotting can be normal, especially during ovulation. If intermenstrual spotting continues, can consider a pelvic US.   -will follow up prn/annual  Orders:  -     POCT wet mount      History of Present Illness     Susana Young is a 24 y.o. female G0 LMP 24 presents for vaginal odor. She reports odor for the last few days. Denies vaginal itching or irritation. Reports brown discharge for the last three days. This is her first episode of intermenstrual spotting. Reports regular monthly menstrual cycles otherwise.     Review of Systems   Constitutional:  Negative for appetite change, chills, fatigue and fever.   Respiratory:  Negative for cough, chest tightness, shortness of breath and wheezing.    Cardiovascular:  Negative for chest pain, palpitations and leg swelling.   Gastrointestinal:  Negative for abdominal distention, abdominal pain, constipation, diarrhea, nausea and vomiting.   Endocrine: Negative for cold intolerance, heat intolerance and polyuria.   Genitourinary:  Negative for difficulty urinating, dyspareunia, dysuria, genital sores, menstrual problem, vaginal bleeding, vaginal discharge and vaginal pain.   Neurological:  Negative for dizziness, weakness, light-headedness and headaches.       Objective     /82 (BP Location: Left arm, Patient Position: Sitting, Cuff Size: Large)   Wt 105 kg (231 lb)   LMP 2024 (Exact Date)   BMI 35.12 kg/m²     Physical Exam  Constitutional:       General: She is not in acute distress.     Appearance: Normal appearance. She is not ill-appearing.   Cardiovascular:      Rate and Rhythm: Normal rate.   Pulmonary:      Effort:  Pulmonary effort is normal. No respiratory distress.   Abdominal:      General: Abdomen is flat. There is no distension.      Palpations: Abdomen is soft.      Tenderness: There is no abdominal tenderness. There is no guarding or rebound.   Genitourinary:     General: Normal vulva.      Exam position: Lithotomy position.      Labia:         Right: No rash, tenderness, lesion or injury.         Left: No rash, tenderness, lesion or injury.       Vagina: Normal. No foreign body. No vaginal discharge, erythema, tenderness, bleeding or lesions.      Cervix: Normal. No cervical motion tenderness, discharge, friability, lesion, erythema, cervical bleeding or eversion.      Uterus: Normal. Not enlarged, not fixed, not tender and no uterine prolapse.       Adnexa: Right adnexa normal and left adnexa normal.        Right: No mass, tenderness or fullness.          Left: No mass, tenderness or fullness.        Comments: Discharge mixed clear/brown   Musculoskeletal:      Right lower leg: No edema.      Left lower leg: No edema.   Neurological:      General: No focal deficit present.      Mental Status: She is alert and oriented to person, place, and time.   Psychiatric:         Mood and Affect: Mood normal.         Behavior: Behavior normal.         Thought Content: Thought content normal.         Judgment: Judgment normal.       Administrative Statements

## 2024-08-26 NOTE — ASSESSMENT & PLAN NOTE
-wet mount negative   -discussed one episode of spotting can be normal, especially during ovulation. If intermenstrual spotting continues, can consider a pelvic US.   -will follow up prn/annual

## 2024-09-14 ENCOUNTER — HOSPITAL ENCOUNTER (EMERGENCY)
Facility: HOSPITAL | Age: 24
Discharge: HOME | End: 2024-09-14
Attending: INTERNAL MEDICINE
Payer: COMMERCIAL

## 2024-09-14 ENCOUNTER — APPOINTMENT (EMERGENCY)
Dept: RADIOLOGY | Facility: HOSPITAL | Age: 24
End: 2024-09-14
Payer: COMMERCIAL

## 2024-09-14 VITALS
RESPIRATION RATE: 18 BRPM | HEIGHT: 68 IN | WEIGHT: 231 LBS | SYSTOLIC BLOOD PRESSURE: 134 MMHG | OXYGEN SATURATION: 98 % | DIASTOLIC BLOOD PRESSURE: 87 MMHG | TEMPERATURE: 97.7 F | HEART RATE: 94 BPM | BODY MASS INDEX: 35.01 KG/M2

## 2024-09-14 DIAGNOSIS — R07.9 CHEST PAIN, UNSPECIFIED TYPE: Primary | ICD-10-CM

## 2024-09-14 LAB
ALBUMIN SERPL BCG-MCNC: 4.8 G/DL (ref 3.5–5)
ALP SERPL-CCNC: 45 U/L (ref 34–104)
ALT SERPL W P-5'-P-CCNC: 9 U/L (ref 7–52)
ANION GAP SERPL CALCULATED.3IONS-SCNC: 8 MMOL/L (ref 4–13)
AST SERPL W P-5'-P-CCNC: 9 U/L (ref 13–39)
ATRIAL RATE: 78 BPM
BASOPHILS # BLD AUTO: 0.03 THOUSANDS/ΜL (ref 0–0.1)
BASOPHILS NFR BLD AUTO: 0 % (ref 0–1)
BILIRUB SERPL-MCNC: 0.45 MG/DL (ref 0.2–1)
BUN SERPL-MCNC: 10 MG/DL (ref 5–25)
CALCIUM SERPL-MCNC: 10.1 MG/DL (ref 8.4–10.2)
CARDIAC TROPONIN I PNL SERPL HS: <2 NG/L
CARDIAC TROPONIN I PNL SERPL HS: <2 NG/L
CHLORIDE SERPL-SCNC: 104 MMOL/L (ref 96–108)
CO2 SERPL-SCNC: 27 MMOL/L (ref 21–32)
CREAT SERPL-MCNC: 0.56 MG/DL (ref 0.6–1.3)
EOSINOPHIL # BLD AUTO: 0.09 THOUSAND/ΜL (ref 0–0.61)
EOSINOPHIL NFR BLD AUTO: 1 % (ref 0–6)
ERYTHROCYTE [DISTWIDTH] IN BLOOD BY AUTOMATED COUNT: 12.4 % (ref 11.6–15.1)
FERRITIN SERPL-MCNC: 55 NG/ML (ref 11–307)
GFR SERPL CREATININE-BSD FRML MDRD: 131 ML/MIN/1.73SQ M
GLUCOSE SERPL-MCNC: 101 MG/DL (ref 65–140)
HCG SERPL QL: NEGATIVE
HCT VFR BLD AUTO: 41 % (ref 34.8–46.1)
HGB BLD-MCNC: 13.4 G/DL (ref 11.5–15.4)
IMM GRANULOCYTES # BLD AUTO: 0.03 THOUSAND/UL (ref 0–0.2)
IMM GRANULOCYTES NFR BLD AUTO: 0 % (ref 0–2)
IRON SATN MFR SERPL: 28 % (ref 15–50)
IRON SERPL-MCNC: 91 UG/DL (ref 50–212)
LIPASE SERPL-CCNC: 13 U/L (ref 11–82)
LYMPHOCYTES # BLD AUTO: 1.35 THOUSANDS/ΜL (ref 0.6–4.47)
LYMPHOCYTES NFR BLD AUTO: 18 % (ref 14–44)
MCH RBC QN AUTO: 27.6 PG (ref 26.8–34.3)
MCHC RBC AUTO-ENTMCNC: 32.7 G/DL (ref 31.4–37.4)
MCV RBC AUTO: 85 FL (ref 82–98)
MONOCYTES # BLD AUTO: 0.44 THOUSAND/ΜL (ref 0.17–1.22)
MONOCYTES NFR BLD AUTO: 6 % (ref 4–12)
NEUTROPHILS # BLD AUTO: 5.75 THOUSANDS/ΜL (ref 1.85–7.62)
NEUTS SEG NFR BLD AUTO: 75 % (ref 43–75)
NRBC BLD AUTO-RTO: 0 /100 WBCS
P AXIS: 49 DEGREES
PLATELET # BLD AUTO: 367 THOUSANDS/UL (ref 149–390)
PMV BLD AUTO: 9.7 FL (ref 8.9–12.7)
POTASSIUM SERPL-SCNC: 3.7 MMOL/L (ref 3.5–5.3)
PR INTERVAL: 158 MS
PROT SERPL-MCNC: 7.7 G/DL (ref 6.4–8.4)
QRS AXIS: 10 DEGREES
QRSD INTERVAL: 86 MS
QT INTERVAL: 382 MS
QTC INTERVAL: 435 MS
RBC # BLD AUTO: 4.85 MILLION/UL (ref 3.81–5.12)
SODIUM SERPL-SCNC: 139 MMOL/L (ref 135–147)
T WAVE AXIS: 27 DEGREES
TIBC SERPL-MCNC: 322 UG/DL (ref 250–450)
UIBC SERPL-MCNC: 231 UG/DL (ref 155–355)
VENTRICULAR RATE: 78 BPM
WBC # BLD AUTO: 7.69 THOUSAND/UL (ref 4.31–10.16)

## 2024-09-14 PROCEDURE — 83550 IRON BINDING TEST: CPT | Performed by: PHYSICIAN ASSISTANT

## 2024-09-14 PROCEDURE — 80053 COMPREHEN METABOLIC PANEL: CPT | Performed by: PHYSICIAN ASSISTANT

## 2024-09-14 PROCEDURE — 36415 COLL VENOUS BLD VENIPUNCTURE: CPT | Performed by: PHYSICIAN ASSISTANT

## 2024-09-14 PROCEDURE — 93005 ELECTROCARDIOGRAM TRACING: CPT

## 2024-09-14 PROCEDURE — 96374 THER/PROPH/DIAG INJ IV PUSH: CPT

## 2024-09-14 PROCEDURE — 83540 ASSAY OF IRON: CPT | Performed by: PHYSICIAN ASSISTANT

## 2024-09-14 PROCEDURE — 96361 HYDRATE IV INFUSION ADD-ON: CPT

## 2024-09-14 PROCEDURE — 84484 ASSAY OF TROPONIN QUANT: CPT | Performed by: PHYSICIAN ASSISTANT

## 2024-09-14 PROCEDURE — 82728 ASSAY OF FERRITIN: CPT | Performed by: PHYSICIAN ASSISTANT

## 2024-09-14 PROCEDURE — 83690 ASSAY OF LIPASE: CPT | Performed by: PHYSICIAN ASSISTANT

## 2024-09-14 PROCEDURE — 71046 X-RAY EXAM CHEST 2 VIEWS: CPT

## 2024-09-14 PROCEDURE — 85025 COMPLETE CBC W/AUTO DIFF WBC: CPT | Performed by: PHYSICIAN ASSISTANT

## 2024-09-14 PROCEDURE — 84703 CHORIONIC GONADOTROPIN ASSAY: CPT | Performed by: PHYSICIAN ASSISTANT

## 2024-09-14 PROCEDURE — 99285 EMERGENCY DEPT VISIT HI MDM: CPT | Performed by: PHYSICIAN ASSISTANT

## 2024-09-14 PROCEDURE — 99285 EMERGENCY DEPT VISIT HI MDM: CPT

## 2024-09-14 RX ORDER — FAMOTIDINE 10 MG/ML
20 INJECTION, SOLUTION INTRAVENOUS ONCE
Status: COMPLETED | OUTPATIENT
Start: 2024-09-14 | End: 2024-09-14

## 2024-09-14 RX ADMIN — FAMOTIDINE 20 MG: 10 INJECTION, SOLUTION INTRAVENOUS at 11:06

## 2024-09-14 RX ADMIN — SODIUM CHLORIDE 1000 ML: 0.9 INJECTION, SOLUTION INTRAVENOUS at 10:58

## 2024-09-14 NOTE — DISCHARGE INSTRUCTIONS
Please return to the emergency department for worsening symptoms including chest pain, shortness of breath, dizziness, lightheadedness, fever greater than 103, severe pain, inability to walk, fainting episodes, etc..  Please follow-up with your family practice provider as soon as possible.  Follow-up with a cardiologist. I have given you a referral. Obtain Holter monitor to further assess abnormal sensation in the chest.

## 2024-09-15 NOTE — ED PROVIDER NOTES
"1. Chest pain, unspecified type      ED Disposition       ED Disposition   Discharge    Condition   Stable    Date/Time   Sat Sep 14, 2024  1:34 PM    Comment   Susana Young discharge to home/self care.                   Assessment & Plan       Medical Decision Making  24-year-old female presenting to the emergency department for left-sided chest pain.  Intermittent for 3 days.  Notes it feels like \"indigestion\".  Vitals are stable.  Afebrile.  EKG shows normal sinus rhythm with a rate of 78.  No STEMI.  Chest x-ray without acute cardiopulmonary disease on my independent interpretation.  Wells 0.  PERC 0.  Heart score 2.  The patient had a negative troponin x 2.  Other labs are reassuring.  The patient was provided reassurance while here in the emergency department.  She is stable for discharge at this time.  Follow-up outpatient with PCP.  Order for Holter monitor placed secondary to feelings of \"heart racing\".  Return to the emergency department with worsening symptoms.  Strict return precautions were given.  Recommend PCP follow-up as soon as possible. The patient and/or patient's proxy verify their understanding and agree to the plan at this time.  All questions answered to the patient and/or their proxy's satisfaction.  All labs reviewed and utilized in the medical decision making process (if labs were ordered).  Portions of the record may have been created with voice recognition software.  Occasional wrong word or \"sound a like\" substitutions may have occurred due to the inherent limitations of voice recognition software.  Read the chart carefully and recognize, using context, where substitutions have occurred.    I reviewed prior notes.  Case discussed with  at bedside.  I reviewed prior EKG.    Problems Addressed:  Chest pain, unspecified type: undiagnosed new problem with uncertain prognosis    Amount and/or Complexity of Data Reviewed  Independent Historian: spouse  External Data Reviewed: ECG and " notes.  Labs: ordered. Decision-making details documented in ED Course.  Radiology: ordered and independent interpretation performed. Decision-making details documented in ED Course.     Details: CXR  ECG/medicine tests: ordered and independent interpretation performed. Decision-making details documented in ED Course.    Risk  Prescription drug management.      Results Reviewed       Procedure Component Value Units Date/Time    TIBC Panel (incl. Iron, TIBC, % Iron Saturation) [[id number]]  (Normal) Collected: 09/14/24 1057    Lab Status: Final result Specimen: Blood from Arm, Left Updated: 09/14/24 1650     Iron Saturation 28 %      TIBC 322 ug/dL      Iron 91 ug/dL      UIBC 231 ug/dL     Ferritin [[id number]]  (Normal) Collected: 09/14/24 1057    Lab Status: Final result Specimen: Blood from Arm, Left Updated: 09/14/24 1650     Ferritin 55 ng/mL     HS Troponin I 2hr [[id number]] Collected: 09/14/24 1247    Lab Status: Final result Specimen: Blood from Arm, Left Updated: 09/14/24 1327     hs TnI 2hr <2 ng/L      Delta 2hr hsTnI --    hCG, qualitative pregnancy [[id number]]  (Normal) Collected: 09/14/24 1057    Lab Status: Final result Specimen: Blood from Arm, Left Updated: 09/14/24 1134     Preg, Serum Negative    HS Troponin 0hr (reflex protocol) [[id number]]  (Normal) Collected: 09/14/24 1057    Lab Status: Final result Specimen: Blood from Arm, Left Updated: 09/14/24 1133     hs TnI 0hr <2 ng/L     Comprehensive metabolic panel [[id number]]  (Abnormal) Collected: 09/14/24 1057    Lab Status: Final result Specimen: Blood from Arm, Left Updated: 09/14/24 1126     Sodium 139 mmol/L      Potassium 3.7 mmol/L      Chloride 104 mmol/L      CO2 27 mmol/L      ANION GAP 8 mmol/L      BUN 10 mg/dL      Creatinine 0.56 mg/dL      Glucose 101 mg/dL      Calcium 10.1 mg/dL      AST 9 U/L      ALT 9 U/L      Alkaline Phosphatase 45 U/L      Total Protein 7.7 g/dL      Albumin 4.8 g/dL      Total Bilirubin 0.45 mg/dL      eGFR  131 ml/min/1.73sq m     Narrative:      National Kidney Disease Foundation guidelines for Chronic Kidney Disease (CKD):     Stage 1 with normal or high GFR (GFR > 90 mL/min/1.73 square meters)    Stage 2 Mild CKD (GFR = 60-89 mL/min/1.73 square meters)    Stage 3A Moderate CKD (GFR = 45-59 mL/min/1.73 square meters)    Stage 3B Moderate CKD (GFR = 30-44 mL/min/1.73 square meters)    Stage 4 Severe CKD (GFR = 15-29 mL/min/1.73 square meters)    Stage 5 End Stage CKD (GFR <15 mL/min/1.73 square meters)  Note: GFR calculation is accurate only with a steady state creatinine    Lipase [[id number]]  (Normal) Collected: 09/14/24 1057    Lab Status: Final result Specimen: Blood from Arm, Left Updated: 09/14/24 1126     Lipase 13 u/L     CBC and differential [[id number]] Collected: 09/14/24 1057    Lab Status: Final result Specimen: Blood from Arm, Left Updated: 09/14/24 1109     WBC 7.69 Thousand/uL      RBC 4.85 Million/uL      Hemoglobin 13.4 g/dL      Hematocrit 41.0 %      MCV 85 fL      MCH 27.6 pg      MCHC 32.7 g/dL      RDW 12.4 %      MPV 9.7 fL      Platelets 367 Thousands/uL      nRBC 0 /100 WBCs      Segmented % 75 %      Immature Grans % 0 %      Lymphocytes % 18 %      Monocytes % 6 %      Eosinophils Relative 1 %      Basophils Relative 0 %      Absolute Neutrophils 5.75 Thousands/µL      Absolute Immature Grans 0.03 Thousand/uL      Absolute Lymphocytes 1.35 Thousands/µL      Absolute Monocytes 0.44 Thousand/µL      Eosinophils Absolute 0.09 Thousand/µL      Basophils Absolute 0.03 Thousands/µL           XR chest 2 views    (Results Pending)       HEART Risk Score      Flowsheet Row Most Recent Value   Heart Score Risk Calculator    History 0 Filed at: 09/14/2024 2049   ECG 1 Filed at: 09/14/2024 2049   Age 0 Filed at: 09/14/2024 2049   Risk Factors 1 Filed at: 09/14/2024 2049   Troponin 0 Filed at: 09/14/2024 2049   HEART Score 2 Filed at: 09/14/2024 2049                    Medications   sodium chloride 0.9 %  "bolus 1,000 mL (0 mL Intravenous Stopped 9/14/24 2248)   Famotidine (PF) (PEPCID) injection 20 mg (20 mg Intravenous Given 9/14/24 1106)       History of Present Illness       This is a 24-year-old female with past medical history significant for anxiety presenting to the emergency department today for left-sided chest pain.  The patient notes a \"indigestion\" sensation that is associated with nonradiating burning left-sided chest pain.  This has been intermittent for approximately 3 days.  It is not associated with shortness of breath but the patient does feel as though her heart is racing.  She denies any lower extremity pain or calf pain.  She has no nausea, vomiting, diarrhea, or constipation.  She has no dizziness, lightheadedness, or visual disturbances.  No prior cardiac history.  She is tearful on initial examination.  She has no abdominal pain or epigastric pain.  It is not associated with breathing.  She denies other complaints at this time.      History provided by:  Patient   used: No    Chest Pain  Pain location:  L chest  Pain quality: burning    Pain radiates to:  Does not radiate  Pain radiates to the back: no    Pain severity:  Mild  Onset quality:  Gradual  Duration:  3 days  Timing:  Intermittent  Progression:  Waxing and waning  Chronicity:  New  Context: not breathing    Relieved by:  None tried  Worsened by:  Nothing tried  Ineffective treatments:  None tried  Associated symptoms: heartburn    Associated symptoms: no abdominal pain, no anorexia, no back pain, no cough, no diaphoresis, no dizziness, no fatigue, no fever, no headache, no nausea, no near-syncope, no numbness, no palpitations, no shortness of breath, no syncope, not vomiting and no weakness            Review of Systems   Constitutional:  Negative for appetite change, chills, diaphoresis, fatigue and fever.   Eyes:  Negative for visual disturbance.   Respiratory:  Positive for chest tightness. Negative for cough, " shortness of breath and wheezing.    Cardiovascular:  Positive for chest pain. Negative for palpitations, leg swelling, syncope and near-syncope.   Gastrointestinal:  Positive for heartburn. Negative for abdominal pain, anorexia, constipation, diarrhea, nausea and vomiting.   Musculoskeletal:  Negative for back pain, neck pain and neck stiffness.   Skin:  Negative for rash and wound.   Neurological:  Negative for dizziness, seizures, syncope, weakness, light-headedness, numbness and headaches.   Psychiatric/Behavioral:  Negative for confusion.    All other systems reviewed and are negative.          Objective     ED Triage Vitals   Temperature Pulse Blood Pressure Respirations SpO2 Patient Position - Orthostatic VS   09/14/24 1111 09/14/24 1040 09/14/24 1040 09/14/24 1040 09/14/24 1040 09/14/24 1040   97.7 °F (36.5 °C) 94 134/87 18 98 % Sitting      Temp Source Heart Rate Source BP Location FiO2 (%) Pain Score    09/14/24 1111 09/14/24 1040 09/14/24 1040 -- 09/14/24 1040    Oral Monitor Left arm  2        Physical Exam  Vitals and nursing note reviewed.   Constitutional:       General: She is not in acute distress.     Appearance: Normal appearance. She is normal weight. She is not ill-appearing, toxic-appearing or diaphoretic.      Comments: Tearful   HENT:      Head: Normocephalic and atraumatic.      Nose: Nose normal. No congestion or rhinorrhea.      Mouth/Throat:      Mouth: Mucous membranes are moist.      Pharynx: No oropharyngeal exudate or posterior oropharyngeal erythema.   Eyes:      General: No scleral icterus.        Right eye: No discharge.         Left eye: No discharge.      Extraocular Movements: Extraocular movements intact.      Pupils: Pupils are equal, round, and reactive to light.   Cardiovascular:      Rate and Rhythm: Normal rate and regular rhythm.      Pulses: Normal pulses.      Heart sounds: Normal heart sounds. No murmur heard.     No friction rub. No gallop.   Pulmonary:      Effort:  Pulmonary effort is normal. No respiratory distress.      Breath sounds: Normal breath sounds. No stridor. No wheezing, rhonchi or rales.   Chest:      Chest wall: No tenderness.   Abdominal:      General: Abdomen is flat. There is no distension.      Palpations: Abdomen is soft.      Tenderness: There is no abdominal tenderness. There is no right CVA tenderness, left CVA tenderness, guarding or rebound.   Musculoskeletal:         General: Normal range of motion.      Cervical back: Normal range of motion. No tenderness.      Right lower leg: No edema.      Left lower leg: No edema.      Comments: Negative Homans' sign bilaterally   Skin:     General: Skin is warm and dry.      Capillary Refill: Capillary refill takes less than 2 seconds.      Coloration: Skin is not jaundiced or pale.   Neurological:      General: No focal deficit present.      Mental Status: She is alert and oriented to person, place, and time. Mental status is at baseline.   Psychiatric:         Mood and Affect: Mood normal.         Behavior: Behavior normal.         Labs Reviewed   COMPREHENSIVE METABOLIC PANEL - Abnormal       Result Value    Sodium 139      Potassium 3.7      Chloride 104      CO2 27      ANION GAP 8      BUN 10      Creatinine 0.56 (*)     Glucose 101      Calcium 10.1      AST 9 (*)     ALT 9      Alkaline Phosphatase 45      Total Protein 7.7      Albumin 4.8      Total Bilirubin 0.45      eGFR 131      Narrative:     National Kidney Disease Foundation guidelines for Chronic Kidney Disease (CKD):     Stage 1 with normal or high GFR (GFR > 90 mL/min/1.73 square meters)    Stage 2 Mild CKD (GFR = 60-89 mL/min/1.73 square meters)    Stage 3A Moderate CKD (GFR = 45-59 mL/min/1.73 square meters)    Stage 3B Moderate CKD (GFR = 30-44 mL/min/1.73 square meters)    Stage 4 Severe CKD (GFR = 15-29 mL/min/1.73 square meters)    Stage 5 End Stage CKD (GFR <15 mL/min/1.73 square meters)  Note: GFR calculation is accurate only with a  steady state creatinine   HS TROPONIN I 0HR - Normal    hs TnI 0hr <2     PREGNANCY TEST (HCG QUALITATIVE) - Normal    Preg, Serum Negative     LIPASE - Normal    Lipase 13     TIBC PANEL (INCL. IRON, TIBC, % IRON SATURATION) - Normal    Iron Saturation 28      TIBC 322      Iron 91      UIBC 231     FERRITIN - Normal    Ferritin 55     CBC AND DIFFERENTIAL    WBC 7.69      RBC 4.85      Hemoglobin 13.4      Hematocrit 41.0      MCV 85      MCH 27.6      MCHC 32.7      RDW 12.4      MPV 9.7      Platelets 367      nRBC 0      Segmented % 75      Immature Grans % 0      Lymphocytes % 18      Monocytes % 6      Eosinophils Relative 1      Basophils Relative 0      Absolute Neutrophils 5.75      Absolute Immature Grans 0.03      Absolute Lymphocytes 1.35      Absolute Monocytes 0.44      Eosinophils Absolute 0.09      Basophils Absolute 0.03     HS TROPONIN I 2HR    hs TnI 2hr <2      Delta 2hr hsTnI       IRON PANEL (INCLUDES FERRITIN,IRON SAT%, IRON, AND TIBC)    Narrative:     The following orders were created for panel order Iron Panel (Includes Ferritin, Iron Sat%, Iron, and TIBC).  Procedure                               Abnormality         Status                     ---------                               -----------         ------                     TIBC Panel (incl. Iron, ...[[id number]]  Normal              Final result               Ferritin[[id number]]                     Normal              Final result                 Please view results for these tests on the individual orders.     XR chest 2 views    (Results Pending)       ECG 12 Lead Documentation Only    Date/Time: 9/14/2024 11:04 AM    Performed by: Bernard Deras PA-C  Authorized by: Bernard Deras PA-C    Indications / Diagnosis:  Chest Pain  Patient location:  ED  Previous ECG:     Previous ECG:  Compared to current    Comparison ECG info:  4/14/2022    Similarity:  No change  Interpretation:     Interpretation:  normal    Rate:     ECG rate:  78    ECG rate assessment: normal    Rhythm:     Rhythm: sinus rhythm    Ectopy:     Ectopy: none    QRS:     QRS axis:  Normal  Conduction:     Conduction: normal    ST segments:     ST segments:  Normal  T waves:     T waves: non-specific    Comments:      Normal sinus rhythm with a rate of 78.  Normal axis.  No ectopy.  No STEMI.  QTc 435.         Bernard Deras PA-C  09/14/24 6112

## 2024-09-16 ENCOUNTER — TELEPHONE (OUTPATIENT)
Age: 24
End: 2024-09-16

## 2024-09-16 LAB
ATRIAL RATE: 78 BPM
P AXIS: 49 DEGREES
PR INTERVAL: 158 MS
QRS AXIS: 10 DEGREES
QRSD INTERVAL: 86 MS
QT INTERVAL: 382 MS
QTC INTERVAL: 435 MS
T WAVE AXIS: 27 DEGREES
VENTRICULAR RATE: 78 BPM

## 2024-09-16 PROCEDURE — 93010 ELECTROCARDIOGRAM REPORT: CPT | Performed by: INTERNAL MEDICINE

## 2024-09-16 NOTE — TELEPHONE ENCOUNTER
09/16/24    Patient called office today looking to schedule an appt for her Anxiety.    Patient was under the impression that she was calling psychology or psychiatrist specialist.    Patient was following her insurance search of Providers, and our number came up.    Informed patient that she was calling Neurology, and advice to contact her PCP for any suggestion of whom she can see for her Anxiety, or maybe her PCP can see her for her condition.    I made no Promises, but it wouldn't hurt to ask.    Patient UNDERSTOOD, AGREED, and Expressed her Thanks.     I also advised patient to schedule appt with Cardiology since a referral was placed.      Any questions, please contact patient.  Thank You.

## 2024-09-18 ENCOUNTER — OFFICE VISIT (OUTPATIENT)
Dept: FAMILY MEDICINE CLINIC | Facility: MEDICAL CENTER | Age: 24
End: 2024-09-18
Payer: COMMERCIAL

## 2024-09-18 ENCOUNTER — TELEPHONE (OUTPATIENT)
Age: 24
End: 2024-09-18

## 2024-09-18 VITALS
WEIGHT: 225.4 LBS | RESPIRATION RATE: 16 BRPM | DIASTOLIC BLOOD PRESSURE: 82 MMHG | SYSTOLIC BLOOD PRESSURE: 120 MMHG | OXYGEN SATURATION: 98 % | BODY MASS INDEX: 34.16 KG/M2 | HEART RATE: 95 BPM | TEMPERATURE: 98.2 F | HEIGHT: 68 IN

## 2024-09-18 DIAGNOSIS — F41.0 PANIC ATTACKS: ICD-10-CM

## 2024-09-18 DIAGNOSIS — F41.1 GENERALIZED ANXIETY DISORDER: Primary | ICD-10-CM

## 2024-09-18 DIAGNOSIS — F40.10 SOCIAL ANXIETY DISORDER: ICD-10-CM

## 2024-09-18 PROCEDURE — 99214 OFFICE O/P EST MOD 30 MIN: CPT

## 2024-09-18 RX ORDER — DIPHENOXYLATE HYDROCHLORIDE AND ATROPINE SULFATE 2.5; .025 MG/1; MG/1
1 TABLET ORAL DAILY
COMMUNITY

## 2024-09-18 RX ORDER — PROMETHAZINE HYDROCHLORIDE 25 MG/1
12.5 TABLET ORAL 3 TIMES DAILY PRN
Qty: 30 TABLET | Refills: 1 | Status: SHIPPED | OUTPATIENT
Start: 2024-09-18 | End: 2024-09-18

## 2024-09-18 RX ORDER — PROMETHAZINE HYDROCHLORIDE 25 MG/1
12.5 TABLET ORAL 3 TIMES DAILY PRN
Qty: 30 TABLET | Refills: 1 | Status: SHIPPED | OUTPATIENT
Start: 2024-09-18 | End: 2024-12-17

## 2024-09-18 NOTE — PROGRESS NOTES
Assessment/Plan:    Return in 6 weeks for follow-up.  Declines COVID vaccination.  She will receive influenza vaccination through school.     1. Generalized anxiety disorder  Restart promethazine as discussed.  Follow up with psychiatry.  Advised patient I will refill this until she gets back in with psychiatry.  Referral placed, additional resources provided for out of network psychiatrist.  ANGIE-10  - promethazine (PHENERGAN) 25 mg tablet; Take 0.5 tablets (12.5 mg total) by mouth 3 (three) times a day as needed for nausea or vomiting  Dispense: 30 tablet; Refill: 1  - Ambulatory referral to Psych Services; Future    2. Social anxiety disorder  Restart promethazine as discussed.  Follow up with psychiatry.  Advised patient I will refill this until she gets back in with psychiatry.  Referral placed, additional resources provided for out of network psychiatrist.  ANGIE-10  - promethazine (PHENERGAN) 25 mg tablet; Take 0.5 tablets (12.5 mg total) by mouth 3 (three) times a day as needed for nausea or vomiting  Dispense: 30 tablet; Refill: 1  - Ambulatory referral to Psych Services; Future    3. Panic attacks  Restart promethazine as discussed.  Follow up with psychiatry.  Advised patient I will refill this until she gets back in with psychiatry.  Referral placed, additional resources provided for out of network psychiatrist.  ANGIE-10  - promethazine (PHENERGAN) 25 mg tablet; Take 0.5 tablets (12.5 mg total) by mouth 3 (three) times a day as needed for nausea or vomiting  Dispense: 30 tablet; Refill: 1  - Ambulatory referral to Psych Services; Future      Subjective:      Patient ID: Susana Young is a 24 y.o. female.    24 year old female presents to discuss anxiety. She has generalized anxiety disorder and panic attacks. Was previously on Promethazine 25 mg with instructions to take 1/2-1 tablet qd-tid. She reports this medication worked well for her in the past and would like to restart this. She weaned herself off of  "this 6 months ago around April because she felt better and thought she would be ok without it. Since then, she quit smoking and her brother age 21 , she is also in school for nursing. She is now vaping and her goal is to quit vaping and get her anxiety under better control.   This was prescribed by psychiatry whom she is no longer following with. She tells me she tried to establish with a different psychiatrist when she was seen [facility] but was advised she will not be able to, then appointments kept getting canceled and she has not been able to get back in.           The following portions of the patient's history were reviewed and updated as appropriate: allergies, past family history, past medical history, past social history, past surgical history, and problem list.    Review of Systems   Constitutional: Negative.    HENT: Negative.     Eyes: Negative.    Respiratory: Negative.     Cardiovascular: Negative.    Gastrointestinal: Negative.    Endocrine: Negative.    Genitourinary: Negative.    Musculoskeletal: Negative.    Skin: Negative.    Allergic/Immunologic: Negative.    Neurological: Negative.    Hematological: Negative.    Psychiatric/Behavioral: Negative.           Objective:      /82 (BP Location: Left arm, Patient Position: Sitting, Cuff Size: Standard)   Pulse 95   Temp 98.2 °F (36.8 °C) (Temporal)   Resp 16   Ht 5' 8\" (1.727 m)   Wt 102 kg (225 lb 6.4 oz)   LMP 2024 (Exact Date)   SpO2 98%   BMI 34.27 kg/m²          Physical Exam  Vitals and nursing note reviewed.   Constitutional:       General: She is not in acute distress.     Appearance: Normal appearance. She is obese. She is not ill-appearing.   HENT:      Head: Normocephalic and atraumatic.   Cardiovascular:      Rate and Rhythm: Normal rate and regular rhythm.      Pulses: Normal pulses.      Heart sounds: Normal heart sounds.   Pulmonary:      Effort: Pulmonary effort is normal.      Breath sounds: Normal " breath sounds.   Neurological:      General: No focal deficit present.      Mental Status: She is alert and oriented to person, place, and time. Mental status is at baseline.   Psychiatric:         Attention and Perception: Attention and perception normal.         Mood and Affect: Mood and affect normal.         Speech: Speech normal.         Behavior: Behavior normal. Behavior is cooperative.         Thought Content: Thought content normal. Thought content does not include suicidal ideation. Thought content does not include suicidal plan.         PHQ-2/9 Depression Screening    Little interest or pleasure in doing things: 0 - not at all  Feeling down, depressed, or hopeless: 0 - not at all  Trouble falling or staying asleep, or sleeping too much: 2 - more than half the days  Feeling tired or having little energy: 0 - not at all  Poor appetite or overeatin - several days  Feeling bad about yourself - or that you are a failure or have let yourself or your family down: 0 - not at all  Trouble concentrating on things, such as reading the newspaper or watching television: 0 - not at all  Moving or speaking so slowly that other people could have noticed. Or the opposite - being so fidgety or restless that you have been moving around a lot more than usual: 0 - not at all  Thoughts that you would be better off dead, or of hurting yourself in some way: 0 - not at all  PHQ-2 Score: 0  PHQ-2 Interpretation: Negative depression screen  PHQ-9 Score: 3  PHQ-9 Interpretation: No or Minimal depression        Depression Screening Follow-up Plan: Patient's depression screening was positive with a PHQ-2 score of 0. Their PHQ-9 score was 3.  Restart Promethazine as discussed, follow up with psychiatry. Referral placed and additional outside resources provided.       ANGIE-7 Flowsheet Screening      Flowsheet Row Most Recent Value   Over the last two weeks, how often have you been bothered by the following problems?     Feeling  nervous, anxious, or on edge 2   Not being able to stop or control worrying 1   Worrying too much about different things 2   Trouble relaxing  2   Being so restless that it's hard to sit still 1   Becoming easily annoyed or irritable  0   Feeling afraid as if something awful might happen 2   ANGIE Score  10                 MAIKOL Majano

## 2024-09-18 NOTE — PATIENT INSTRUCTIONS
[facility] [phone]    Penny Cifuentes, NP Psychiatry [phone]    Erin Rahman NP Psychiatry [phone]    [facility] [phone]    [facility] [phone]

## 2024-09-18 NOTE — TELEPHONE ENCOUNTER
The writer attempted to contact the patient to verify the need for services. The writer LVM for the patient to contact the office for assistance with being placed on the proper wait list .    1st attempt

## 2024-09-23 NOTE — TELEPHONE ENCOUNTER
Contacted the patient regarding a routine referral to verify service needs and inform pt of the current waitlist. Patient stated she was going to look elsewhere, she was a previous pt at  and would call intake should she not be able to get in elsewhere. Writer offered outside resources but pt stated her PCP had provided her with a list.     Referral closed.

## 2024-10-15 ENCOUNTER — HOSPITAL ENCOUNTER (OUTPATIENT)
Dept: NON INVASIVE DIAGNOSTICS | Facility: CLINIC | Age: 24
Discharge: HOME | End: 2024-10-15
Payer: COMMERCIAL

## 2024-10-15 DIAGNOSIS — R07.9 CHEST PAIN, UNSPECIFIED TYPE: ICD-10-CM

## 2024-10-15 PROCEDURE — 93225 XTRNL ECG REC<48 HRS REC: CPT

## 2024-10-15 PROCEDURE — 93226 XTRNL ECG REC<48 HR SCAN A/R: CPT

## 2024-10-16 PROCEDURE — 93227 XTRNL ECG REC<48 HR R&I: CPT | Performed by: INTERNAL MEDICINE

## 2024-10-17 ENCOUNTER — TELEPHONE (OUTPATIENT)
Age: 24
End: 2024-10-17

## 2024-10-17 NOTE — TELEPHONE ENCOUNTER
Patient called back and stated she spoke with someone earlier and was given the results of her Holter monitor.  She is concerned about the 17 extra beats and stated she has a history of passing out when she stands up and wanted to know if they were related.  She was advised to contact cardiology to schedule.  She was unable to schedule with cardiology, she would be a new patient and would need a referral.  They referred her back to her PCP.  She would like to schedule a virtual visit or in office visit to discuss the results sooner than her appointment on 11/6.  Please advise if anything becomes available on Tuesday, Wednesday after 10 am, or any other day after 3 pm.  Thank you!

## 2024-10-23 ENCOUNTER — OFFICE VISIT (OUTPATIENT)
Dept: FAMILY MEDICINE CLINIC | Facility: MEDICAL CENTER | Age: 24
End: 2024-10-23
Payer: COMMERCIAL

## 2024-10-23 VITALS
OXYGEN SATURATION: 99 % | TEMPERATURE: 98.4 F | DIASTOLIC BLOOD PRESSURE: 60 MMHG | BODY MASS INDEX: 33.65 KG/M2 | SYSTOLIC BLOOD PRESSURE: 118 MMHG | HEIGHT: 68 IN | WEIGHT: 222 LBS | HEART RATE: 83 BPM

## 2024-10-23 DIAGNOSIS — Z01.84 IMMUNITY STATUS TESTING: ICD-10-CM

## 2024-10-23 DIAGNOSIS — Z23 ENCOUNTER FOR IMMUNIZATION: ICD-10-CM

## 2024-10-23 DIAGNOSIS — R00.2 PALPITATIONS: Primary | ICD-10-CM

## 2024-10-23 PROCEDURE — 99214 OFFICE O/P EST MOD 30 MIN: CPT

## 2024-10-23 PROCEDURE — 90744 HEPB VACC 3 DOSE PED/ADOL IM: CPT

## 2024-10-23 PROCEDURE — 90471 IMMUNIZATION ADMIN: CPT

## 2024-10-23 NOTE — PROGRESS NOTES
Assessment/Plan:       1. Palpitations  Check TSH.  Follow-up with cardiology.  EKG and Holter monitor test reviewed in office today, unremarkable.  - TSH, 3rd generation with Free T4 reflex; Future  - Ambulatory Referral to Cardiology; Future    2. Encounter for immunization  Hepatitis B #3 vaccination updated today.  - HEPATITIS B VACCINE PEDIATRIC / ADOLESCENT 3-DOSE IM    3. Immunity status testing  Due in 6-8 weeks.  - Hepatitis B surface antibody; Future      Subjective:      Patient ID: Susana Young is a 24 y.o. female.    24-year-old female presents to discuss recent Holter monitor results ordered by Emergency Department provider.   Most recent EKG from 9/14/2024 returned showing normal sinus rhythm, heart rate 78 bpm, no significant abnormalities noted on EKG.  Holter monitor performed on 10/15/2024 returned showing sinus rhythm with minimal ectopic activity.  She tells me she does still have intermittent palpitations and rare episodes of chest pain.  However, denies shortness of breath.  She is currently asymptomatic.  She would like to make sure her symptoms are not cardiac related before she attributes this to her anxiety.  She does tell me that since restarting her promethazine for her anxiety it is working well for her, she is still working on getting in with psychiatry.  She also needs her 3rd Hep B titer today that is due and immunity testing orders for 6-8 weeks.         The following portions of the patient's history were reviewed and updated as appropriate: current medications, past family history, past medical history, past social history, past surgical history, and problem list.    Review of Systems   Constitutional: Negative.    HENT: Negative.     Eyes: Negative.    Respiratory: Negative.     Cardiovascular:  Positive for palpitations (intermittent).   Gastrointestinal: Negative.    Endocrine: Negative.    Genitourinary: Negative.    Musculoskeletal: Negative.    Skin: Negative.   "  Allergic/Immunologic: Negative.    Neurological: Negative.    Hematological: Negative.    Psychiatric/Behavioral: Negative.           Objective:      /60 (BP Location: Left arm, Patient Position: Sitting, Cuff Size: Standard)   Pulse 83   Temp 98.4 °F (36.9 °C) (Temporal)   Ht 5' 8\" (1.727 m)   Wt 101 kg (222 lb)   SpO2 99%   BMI 33.75 kg/m²          Physical Exam  Vitals and nursing note reviewed.   Constitutional:       General: She is not in acute distress.     Appearance: Normal appearance. She is obese. She is not ill-appearing.   HENT:      Head: Normocephalic and atraumatic.   Neck:      Vascular: No carotid bruit.   Cardiovascular:      Rate and Rhythm: Normal rate and regular rhythm.      Pulses: Normal pulses.      Heart sounds: Normal heart sounds.   Pulmonary:      Effort: Pulmonary effort is normal.      Breath sounds: Normal breath sounds.   Musculoskeletal:      Cervical back: Normal range of motion and neck supple.   Neurological:      General: No focal deficit present.      Mental Status: She is alert and oriented to person, place, and time. Mental status is at baseline.   Psychiatric:         Mood and Affect: Mood normal.         Behavior: Behavior normal.         Thought Content: Thought content normal.                    MAIKOL Majano  "

## 2024-12-05 ENCOUNTER — RESULTS FOLLOW-UP (OUTPATIENT)
Dept: FAMILY MEDICINE CLINIC | Facility: MEDICAL CENTER | Age: 24
End: 2024-12-05

## 2024-12-05 LAB
HBV SURFACE AB SERPL IA-ACNC: 130 MIU/ML
TSH SERPL-ACNC: 1.1 MIU/L (ref 0.4–4.5)

## 2025-01-13 ENCOUNTER — TELEPHONE (OUTPATIENT)
Age: 25
End: 2025-01-13

## 2025-01-13 ENCOUNTER — NURSE TRIAGE (OUTPATIENT)
Age: 25
End: 2025-01-13

## 2025-01-13 DIAGNOSIS — N30.00 ACUTE CYSTITIS WITHOUT HEMATURIA: Primary | ICD-10-CM

## 2025-01-13 RX ORDER — SULFAMETHOXAZOLE AND TRIMETHOPRIM 800; 160 MG/1; MG/1
1 TABLET ORAL EVERY 12 HOURS SCHEDULED
Qty: 10 TABLET | Refills: 0 | Status: SHIPPED | OUTPATIENT
Start: 2025-01-13 | End: 2025-01-14 | Stop reason: SINTOL

## 2025-01-13 NOTE — TELEPHONE ENCOUNTER
Pt called to say she was in the ED yesterday with abdominal pain, nausea, vomiting, diarrhea and UTI sxs. The Zofran is helping her nausea but she is still experiencing abdominal pain, diarrhea and pelvic pain with cloudy urine that smells. The ED did urine testing, the results are in her chart. The pt would like something prescribed for the UTI sxs and if there is anything else she can take for the diarrhea. She also would like to make an ED f/u appt this week. Currently the only available appt (tomorrow at 4:40) she can not make as she will not have a ride. Please advise as soon as possible.

## 2025-01-13 NOTE — TELEPHONE ENCOUNTER
Bactrim sent to pharmacy for UTI. May continue zofran as needed for nausea. Imodium as needed for diarrhea symptoms. This is otc.

## 2025-01-13 NOTE — TELEPHONE ENCOUNTER
"Reason for Disposition   Caller wants to use a complementary or alternative medicine    Answer Assessment - Initial Assessment Questions  1. NAME of MEDICINE: \"What medicine(s) are you calling about?\"      Bactrim   2. QUESTION: \"What is your question?\" (e.g., double dose of medicine, side effect)      Side effect   3. PRESCRIBER: \"Who prescribed the medicine?\" Reason: if prescribed by specialist, call should be referred to that group.      Elena Sanchez   4. SYMPTOMS: \"Do you have any symptoms?\" If Yes, ask: \"What symptoms are you having?\"  \"How bad are the symptoms (e.g., mild, moderate, severe)      Increased heart rate    5. PREGNANCY:  \"Is there any chance that you are pregnant?\" \"When was your last menstrual period?\"      No   Patient stated she is followed by Cardio for increased heart rate.  Patient stated she didn't start taking the medication as of yet  Patient wanted to know if her heart rate increases while taking the  medication can a substitute  be prescribed.    Protocols used: Medication Question Call-Adult-[place]    "

## 2025-01-14 DIAGNOSIS — N30.00 ACUTE CYSTITIS WITHOUT HEMATURIA: Primary | ICD-10-CM

## 2025-01-14 RX ORDER — NITROFURANTOIN 25; 75 MG/1; MG/1
100 CAPSULE ORAL 2 TIMES DAILY
Qty: 10 CAPSULE | Refills: 0 | Status: SHIPPED | OUTPATIENT
Start: 2025-01-14 | End: 2025-01-19

## 2025-01-31 ENCOUNTER — RESULTS FOLLOW-UP (OUTPATIENT)
Dept: FAMILY MEDICINE CLINIC | Facility: MEDICAL CENTER | Age: 25
End: 2025-01-31

## 2025-01-31 ENCOUNTER — OFFICE VISIT (OUTPATIENT)
Dept: FAMILY MEDICINE CLINIC | Facility: MEDICAL CENTER | Age: 25
End: 2025-01-31
Payer: COMMERCIAL

## 2025-01-31 VITALS
RESPIRATION RATE: 18 BRPM | TEMPERATURE: 98.5 F | OXYGEN SATURATION: 98 % | BODY MASS INDEX: 32.61 KG/M2 | HEIGHT: 68 IN | DIASTOLIC BLOOD PRESSURE: 70 MMHG | SYSTOLIC BLOOD PRESSURE: 120 MMHG | WEIGHT: 215.2 LBS | HEART RATE: 94 BPM

## 2025-01-31 DIAGNOSIS — N64.4 BREAST TENDERNESS: Primary | ICD-10-CM

## 2025-01-31 DIAGNOSIS — N30.00 ACUTE CYSTITIS WITHOUT HEMATURIA: Primary | ICD-10-CM

## 2025-01-31 DIAGNOSIS — N30.00 ACUTE CYSTITIS WITHOUT HEMATURIA: ICD-10-CM

## 2025-01-31 DIAGNOSIS — R39.9 UTI SYMPTOMS: ICD-10-CM

## 2025-01-31 LAB
BACTERIA UR QL AUTO: ABNORMAL /HPF
BILIRUB UR QL STRIP: NEGATIVE
CLARITY UR: ABNORMAL
COLOR UR: YELLOW
GLUCOSE UR STRIP-MCNC: NEGATIVE MG/DL
HGB UR QL STRIP.AUTO: NEGATIVE
KETONES UR STRIP-MCNC: NEGATIVE MG/DL
LEUKOCYTE ESTERASE UR QL STRIP: NEGATIVE
MUCOUS THREADS UR QL AUTO: ABNORMAL
NITRITE UR QL STRIP: NEGATIVE
NON-SQ EPI CELLS URNS QL MICRO: ABNORMAL /HPF
PH UR STRIP.AUTO: 7.5 [PH]
PROT UR STRIP-MCNC: ABNORMAL MG/DL
RBC #/AREA URNS AUTO: ABNORMAL /HPF
SL AMB  POCT GLUCOSE, UA: NORMAL
SL AMB LEUKOCYTE ESTERASE,UA: NORMAL
SL AMB POCT BILIRUBIN,UA: NORMAL
SL AMB POCT BLOOD,UA: NORMAL
SL AMB POCT CLARITY,UA: NORMAL
SL AMB POCT COLOR,UA: NORMAL
SL AMB POCT KETONES,UA: NORMAL
SL AMB POCT NITRITE,UA: NORMAL
SL AMB POCT PH,UA: 7.5
SL AMB POCT SPECIFIC GRAVITY,UA: 1.01
SL AMB POCT URINE PROTEIN: NORMAL
SL AMB POCT UROBILINOGEN: NORMAL
SP GR UR STRIP.AUTO: 1.02 (ref 1–1.03)
UROBILINOGEN UR STRIP-ACNC: <2 MG/DL
WBC #/AREA URNS AUTO: ABNORMAL /HPF

## 2025-01-31 PROCEDURE — 81003 URINALYSIS AUTO W/O SCOPE: CPT | Performed by: STUDENT IN AN ORGANIZED HEALTH CARE EDUCATION/TRAINING PROGRAM

## 2025-01-31 PROCEDURE — 87086 URINE CULTURE/COLONY COUNT: CPT | Performed by: STUDENT IN AN ORGANIZED HEALTH CARE EDUCATION/TRAINING PROGRAM

## 2025-01-31 PROCEDURE — 81001 URINALYSIS AUTO W/SCOPE: CPT | Performed by: STUDENT IN AN ORGANIZED HEALTH CARE EDUCATION/TRAINING PROGRAM

## 2025-01-31 PROCEDURE — 99214 OFFICE O/P EST MOD 30 MIN: CPT | Performed by: STUDENT IN AN ORGANIZED HEALTH CARE EDUCATION/TRAINING PROGRAM

## 2025-01-31 RX ORDER — CIPROFLOXACIN 500 MG/1
500 TABLET, FILM COATED ORAL EVERY 24 HOURS
Qty: 3 TABLET | Refills: 0 | OUTPATIENT
Start: 2025-01-31 | End: 2025-02-03

## 2025-01-31 RX ORDER — CIPROFLOXACIN 500 MG/1
500 TABLET, FILM COATED ORAL EVERY 24 HOURS
Qty: 3 TABLET | Refills: 0 | Status: SHIPPED | OUTPATIENT
Start: 2025-01-31 | End: 2025-02-03

## 2025-01-31 NOTE — PROGRESS NOTES
[facility] - Clinic Note  Nicole Ramírez DO, 26     Susana Young MRN: [id number] : 2000 Age: 24 y.o.     Assessment/Plan     Problem List Items Addressed This Visit    None  Visit Diagnoses         Breast tenderness    -  Primary    Relevant Orders    US breast left limited (diagnostic)    Mammo diagnostic left w 3d and cad      UTI symptoms        Relevant Orders    POCT urine dip auto non-scope (Completed)    Urine culture    UA w Reflex to Microscopic w Reflex to Culture          Susana Young acknowledged understanding of treatment plan, all questions answered.    Subjective      Susana Young is a 24 y.o. female who presents for acute visit.  She is a patient of MAIKOL Majano.  Patient mentions as a teenager she experienced some pain in the left breast and was advised that it was a cyst.  Pain has returned and left breast.  She does feel a lump.  She noticed the lump last night.  No nipple discharge or bleeding.  Lump is painful and she mentions pain radiates throughout her breast. FDLMP 25.  Patient mentions she was treated with UTI not too long ago.  She was prescribed Macrobid.  Patient mentions some foul odor to urine and cloudy today.  She endorses urinary urgency and sense of incomplete bladder emptying.  No fevers.    The following portions of the patient's history were reviewed and updated as appropriate: allergies, current medications, past family history, past medical history, past social history, past surgical history and problem list.   Past Medical History:   Diagnosis Date    Allergic     pollen; animal dander    Anemia     Anxiety     Closed left ankle fracture     Dysmenorrhea     Influenza A 2020    Insomnia     Moderate single current episode of major depressive disorder (HCC) 2017       No Known Allergies    Past Surgical History:   Procedure Laterality Date    ANKLE SURGERY Left     two screws       Family History   Problem  "Relation Age of Onset    Bipolar disorder Mother         Fentanyl per Pt    Drug abuse Mother     No Known Problems Father     Anxiety disorder Sister     Bipolar disorder Sister     Leukemia Paternal Aunt     Autism spectrum disorder Cousin     Bipolar disorder Half-Sister     Breast cancer Neg Hx     Ovarian cancer Neg Hx        Social History     Socioeconomic History    Marital status: /Civil Union     Spouse name: None    Number of children: 0    Years of education: None    Highest education level: None   Occupational History    Occupation: [employer]     Comment: Went to full time as of the end of 2021   Tobacco Use    Smoking status: Former     Current packs/day: 0.00     Average packs/day: 0.5 packs/day for 7.0 years (3.5 ttl pk-yrs)     Types: Cigarettes     Start date: 2014     Quit date: 2021     Years since quittin.0    Smokeless tobacco: Never   Vaping Use    Vaping status: Every Day    Substances: Nicotine   Substance and Sexual Activity    Alcohol use: Not Currently     Comment: 1 glass of wine approx 1-2x weekly.  Pt denies h/o abuse    Drug use: Not Currently     Types: Marijuana     Comment: medical marijuana    Sexual activity: Yes     Partners: Male     Birth control/protection: Condom Male   Other Topics Concern    None   Social History Narrative    Coffee: denied     Drinks caffeinated tea    Ingests cola containing caffeine: denied        Home: Lived in a shared apt with live in Boyfriend, but they broke up and she moved in with a friend        Education:    Pt denies any h/o learning disabilities but went through an \"Extra year in --due to behavioral issue\".  She took advanced placement classes while still in HS.  She reached childhood milestones on time per her father Mp.      Graduated HS 2020    No college     Social Drivers of Health     Financial Resource Strain: Not on file   Food Insecurity: Not on file   Transportation Needs: Not on " "file   Physical Activity: Not on file   Stress: Not on file   Social Connections: Not on file   Intimate Partner Violence: Not on file   Housing Stability: Not on file       Current Outpatient Medications   Medication Sig Dispense Refill    acetaminophen (TYLENOL) 325 mg tablet Take 3 tablets (975 mg total) by mouth 3 (three) times a day as needed for mild pain 30 tablet 0    Ferrous Sulfate (IRON) 325 (65 Fe) MG TABS Take 1 tablet by mouth daily      Loratadine (CLARITIN PO) Take by mouth daily      Multiple Vitamins-Minerals (ZINC PO) Take by mouth      multivitamin (THERAGRAN) TABS Take 1 tablet by mouth daily      promethazine (PHENERGAN) 25 mg tablet Take 0.5 tablets (12.5 mg total) by mouth 3 (three) times a day as needed for nausea or vomiting 30 tablet 1    VITAMIN D PO Take by mouth       No current facility-administered medications for this visit.       Review of Systems     As noted in HPI    Objective      /70 (BP Location: Left arm, Patient Position: Sitting, Cuff Size: Large)   Pulse 94   Temp 98.5 °F (36.9 °C) (Temporal)   Resp 18   Ht 5' 8\" (1.727 m)   Wt 97.6 kg (215 lb 3.2 oz)   SpO2 98%   BMI 32.72 kg/m²     Physical Exam  Vitals reviewed. Exam conducted with a chaperone present (BARB Roberts).   Constitutional:       General: She is not in acute distress.     Appearance: Normal appearance.   HENT:      Head: Normocephalic and atraumatic.   Eyes:      Conjunctiva/sclera: Conjunctivae normal.   Pulmonary:      Effort: Pulmonary effort is normal.   Chest:   Breasts:     Right: No swelling, bleeding, mass, nipple discharge, skin change or tenderness.      Left: Tenderness present. No swelling, bleeding, mass, nipple discharge or skin change.          Comments: Dense breast tissue  Lymphadenopathy:      Upper Body:      Right upper body: No supraclavicular, axillary or pectoral adenopathy.      Left upper body: No supraclavicular, axillary or pectoral adenopathy.   Skin:     General: Skin is " "warm and dry.   Neurological:      Mental Status: She is alert and oriented to person, place, and time.   Psychiatric:         Mood and Affect: Mood normal.         Behavior: Behavior normal.         Thought Content: Thought content normal.             Some portions of this record may have been generated with voice recognition software. There may be translation, syntax, or grammatical errors. Occasional wrong word or \"sound-a-like\" substitutions may have occurred due to the inherent limitations of the voice recognition software. Read the chart carefully and recognize, using context, where substations may have occurred. If you have any questions, please contact the dictating provider for clarification or correction, as needed.  "

## 2025-01-31 NOTE — TELEPHONE ENCOUNTER
Sent to wrong pharmacy     Reason for call:   [x] Refill   [] Prior Auth  [] Other:     Office:   [x] PCP/Provider -   [] Specialty/Provider -     Medication: Ciprofloxacin 500 mg, take 1 tablet by mouth every 24 hours for 3 days      Pharmacy: [facility]     Does the patient have enough for 3 days?   [] Yes   [x] No - Send as HP to POD

## 2025-02-02 LAB — BACTERIA UR CULT: NORMAL

## 2025-02-03 ENCOUNTER — TELEPHONE (OUTPATIENT)
Age: 25
End: 2025-02-03

## 2025-02-03 NOTE — TELEPHONE ENCOUNTER
ciprofloxacin (CIPRO) 500 mg tablet [[id number]]    Order Details  Dose: 500 mg Route: Oral Frequency: Every 24 hours   Dispense Quantity       Patient started Cipro on Friday and after second day patient started having Side effects fast heart rate, very fidgety, headache and couldn sleep.  Patient has one left and wants to know if she should finish?    Leave a detailed message or [email or web address]!hart message as she is in clinicals today.     Thank you

## 2025-02-03 NOTE — TELEPHONE ENCOUNTER
Patient should stop ciprofloxacin.  Continue to hydrate very well.  Contact us if any persistent urinary symptoms and we will recollect urine as recent urine collection next contaminants.

## 2025-02-11 DIAGNOSIS — R39.9 UTI SYMPTOMS: Primary | ICD-10-CM

## 2025-02-11 DIAGNOSIS — Z59.9 FINANCIAL DIFFICULTY: ICD-10-CM

## 2025-02-11 SDOH — ECONOMIC STABILITY - INCOME SECURITY: PROBLEM RELATED TO HOUSING AND ECONOMIC CIRCUMSTANCES, UNSPECIFIED: Z59.9

## 2025-02-20 ENCOUNTER — PATIENT OUTREACH (OUTPATIENT)
Dept: CASE MANAGEMENT | Facility: OTHER | Age: 25
End: 2025-02-20

## 2025-02-20 ENCOUNTER — NURSE TRIAGE (OUTPATIENT)
Age: 25
End: 2025-02-20

## 2025-02-20 ENCOUNTER — TELEPHONE (OUTPATIENT)
Dept: OTHER | Facility: OTHER | Age: 25
End: 2025-02-20

## 2025-02-20 NOTE — TELEPHONE ENCOUNTER
"Pt called in stating she was sent to the ED with norovirus back in January. She had labs done which showed a UTI, was treated with antibiotics. She had a follow up visit with her PCP after as she was still having symptoms. She had another UA done that was suspicious for UTI but culture came back inconclusive- was put on another antibiotic for 3 days. Still having symptoms- pelvic pain, urinary frequency, cloudy urine, white vaginal discharge. She was then advised to follow up with OBGYN for a vaginal swab. Pt denies any vaginal itching, irritation, discomfort. She is currently on her period. 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 Pt scheduled for Thursday 2/27/25. Encouraged her to increase hydration, drink cranberry juice and practice good vaginal hygiene. Pt advised to have urine testing completed if symptoms worsen in the meantime. Pt thankful.       Reason for Disposition   Patient wants to be seen    Answer Assessment - Initial Assessment Questions  1. LOCATION: \"Where does it hurt?\"       Pelvic pain  2. RADIATION: \"Does the pain shoot anywhere else?\" (e.g., lower back, groin, thighs)      denies  3. ONSET: \"When did the pain begin?\" (e.g., minutes, hours or days ago)       january  4. SUDDEN: \"Gradual or sudden onset?\"      gradual  5. PATTERN \"Does the pain come and go, or is it constant?\"      Comes and goes  6. SEVERITY: \"How bad is the pain?\"  (e.g., Scale 1-10; mild, moderate, or severe)      mild  7. RECURRENT SYMPTOM: \"Have you ever had this type of pelvic pain before?\" If Yes, ask: \"When was the last time?\" and \"What happened that time?\"       Ongoing since january  8. CAUSE: \"What do you think is causing the pelvic pain?\"      UTI  9. RELIEVING/AGGRAVATING FACTORS: \"What makes it better or worse?\" (e.g., activity/rest, sexual intercourse, voiding, passing stool)      Antibiotic helped  10. OTHER SYMPTOMS: \"Has there been any other symptoms?\" (e.g., " "fever, constipation, diarrhea, urine problems, vaginal bleeding, vaginal discharge, or vomiting?\"        denies  11. PREGNANCY: \"Is there any chance you are pregnant?\" \"When was your last menstrual period?\"        LMP 2/19/25    Protocols used: Pelvic Pain - Female-Adult-[place]    "

## 2025-02-20 NOTE — PROGRESS NOTES
OREN AHUMADA received referral to contact patient to offer support regarding financial difficulties.  Chart reviewed and call placed to patient and message left.  Received return call and message from patient and return call placed to patient today.     Patient reports that she is having difficulty affording her medical bills.  Patient reports that she has her father's health insurance which is primary for her and her 's employer plan which is secondary and patient reports that her bills and co-pays are still unaffordable.      Patient reports that she and her  reside together. She reports they are both in college and her  works full time and pays for most of the bills.  Patient reports she is only able to work per rachelle on weekends. Patient reports they do not qualify for Medical Assistance or SNAP benefits at this time.      Reviewed [facility] Financial office programs and payment systems and patient reports that she has already been in contact and is currently on a payment program.  Reviewed local food bank resources as a means to offset food costs in order to have money to pay other bills and a list of local food pantries has been emailed to patient via [employer].    Supportive counseling provided to patient who denies further needs at this time.  Patient reports that she has access to housing and transportation.  She reports that she and her  are good supports to one another.  Will close referral and remain available to provide support.

## 2025-02-26 NOTE — PROGRESS NOTES
Diagnoses and all orders for this visit:    Pelvic pain  -     Urinalysis with microscopic  -     Urine culture  -     POCT wet mount  -     US pelvis complete w transvaginal; Future    Screening for STD (sexually transmitted disease)  -     Chlamydia/GC amplified DNA by PCR      Results for orders placed or performed in visit on 02/27/25   POCT wet mount   Result Value Ref Range    Yeast, Wet Prep Neg     pH 4.5     Whiff Test Neg     Clue Cells Neg     Trich, Wet Prep Neg      Reviewed perineal hygiene and products to avoid.  Reviewed UTI prevention     Call if no symptom improvement, all questions answered, return for annual.     Susana Young is a 24 y.o. female here for vaginal complaints. She reports urinary frequency, cloudy urine, white vaginal discharge, slight vaginal odor which she feels is from her urine, no itching or irritation. Reports pelvic pain that started in January, feels like period like symptoms.   She was started on antibiotics from primary for a UTI, after being in [facility] for the Noro virus, she saw her urine results and thought she had a UTI so she called her primary. she was prescribed Bactrim,Marcobid and then Cipro for possible UTI x 2 , Neg urine culture in the system  Her symptoms were getting better after starting antibiotics, they are now slowly starting to return .  She has been using a water based lubricant ( they have not been active recently)  Denies douching.   Denies fever,  or dyspareunia. Denies new sexual partners.    She is feeling frustrated at this time as she is now not sure what is going on with her     Vitals:    02/27/25 1059   BP: 122/78   BP Location: Left arm   Patient Position: Sitting   Cuff Size: Standard   Weight: 96.1 kg (211 lb 12.8 oz)     Body mass index is 32.2 kg/m².  Allergies   Allergen Reactions    Sulfa Antibiotics Rash       Patient Active Problem List    Diagnosis Date Noted    Vaginal odor 08/26/2024    Mass of left breast 07/24/2023    Encounter  for long-term (current) use of high-risk medication 2022    Syncope and collapse 2021    Bipolar II disorder (HCC) 2021    Chronic post-traumatic stress disorder (PTSD) 2021    Panic attacks 2021    Insomnia 2021    Hypokalemia 2020    Environmental and seasonal allergies 2020    Concussion with no loss of consciousness 2019    Generalized anxiety disorder 2017    Social anxiety disorder 2017    Dysmenorrhea 2016    Iron deficiency 2016    Contact dermatitis and other eczema, due to unspecified cause 2014     Past Medical History:   Diagnosis Date    Allergic     pollen; animal dander    Anemia     Anxiety     Closed left ankle fracture     Dysmenorrhea     Influenza A 2020    Insomnia     Moderate single current episode of major depressive disorder (HCC) 2017     Past Surgical History:   Procedure Laterality Date    ANKLE SURGERY Left     two screws     Social History     Tobacco Use    Smoking status: Former     Current packs/day: 0.00     Average packs/day: 0.5 packs/day for 7.0 years (3.5 ttl pk-yrs)     Types: Cigarettes     Start date: 2014     Quit date: 2021     Years since quittin.1    Smokeless tobacco: Never   Vaping Use    Vaping status: Every Day    Substances: Nicotine   Substance Use Topics    Alcohol use: Not Currently     Comment: 1 glass of wine approx 1-2x weekly.  Pt denies h/o abuse    Drug use: Not Currently     Types: Marijuana     Comment: medical marijuana       Current Outpatient Medications:     acetaminophen (TYLENOL) 325 mg tablet, Take 3 tablets (975 mg total) by mouth 3 (three) times a day as needed for mild pain, Disp: 30 tablet, Rfl: 0    Ferrous Sulfate (IRON) 325 (65 Fe) MG TABS, Take 1 tablet by mouth daily, Disp: , Rfl:     Loratadine (CLARITIN PO), Take by mouth daily, Disp: , Rfl:     Multiple Vitamins-Minerals (ZINC PO), Take by mouth, Disp: , Rfl:     multivitamin  (THERAGRAN) TABS, Take 1 tablet by mouth daily, Disp: , Rfl:     VITAMIN D PO, Take by mouth, Disp: , Rfl:     promethazine (PHENERGAN) 25 mg tablet, Take 0.5 tablets (12.5 mg total) by mouth 3 (three) times a day as needed for nausea or vomiting, Disp: 30 tablet, Rfl: 1      OB History    Para Term  AB Living   0 0 0 0 0 0   SAB IAB Ectopic Multiple Live Births   0 0 0 0 0       Review of Systems   Constitutional: Negative for chills, fatigue, fever and unexpected weight change.   Respiratory: Negative for shortness of breath.    Gastrointestinal: Negative abdominal pain, constipation and diarrhea.   Genitourinary: Negative for difficulty urinating, dysuria and hematuria.     Physical Exam   Constitutional: Appears well-developed and well-nourished. No distress. Alert and oriented.  HENT: Atraumatic, Normocephalic.  Conjunctivae clear  Neck: Normal range of motion.   Pulmonary: Effort normal.  Abdominal: Soft. Negative for pain, tenderness or mass  Musculoskeletal: Normal ROM  Skin: Warm & Dry  Psychological: Normal mood, thought content, behavior & judgement       Pelvic exam was performed with patient supine, lithotomy position.      Labia: Right: Negative rash, tenderness, lesion or injury               Left: Negative rash, tenderness, lesion or injury   Urethral meatus:  Negative for  tenderness, inflammation or discharge.   Uterus: not deviated, enlarged, fixed or tender.   Cervix: No CMT, no discharge or friability.   Right adnexa: no mass, no tenderness and no fullness.  Left adnexa: no mass, no tenderness and no fullness.   Vagina: No erythema, tenderness, masses, or foreign body in the vagina. No signs of injury around the vagina. No unusual vaginal discharge   Perineum without lesions, signs of injury, erythema or swelling.  Inguinal Canal:        Right: No inguinal adenopathy or hernia present.        Left: No inguinal adenopathy or hernia present.

## 2025-02-27 ENCOUNTER — HOSPITAL ENCOUNTER (OUTPATIENT)
Dept: ULTRASOUND IMAGING | Facility: CLINIC | Age: 25
Discharge: HOME | End: 2025-02-27
Payer: COMMERCIAL

## 2025-02-27 ENCOUNTER — OFFICE VISIT (OUTPATIENT)
Dept: OBGYN CLINIC | Facility: CLINIC | Age: 25
End: 2025-02-27
Payer: COMMERCIAL

## 2025-02-27 VITALS — BODY MASS INDEX: 32.2 KG/M2 | DIASTOLIC BLOOD PRESSURE: 78 MMHG | WEIGHT: 211.8 LBS | SYSTOLIC BLOOD PRESSURE: 122 MMHG

## 2025-02-27 VITALS — WEIGHT: 211 LBS | BODY MASS INDEX: 31.98 KG/M2 | HEIGHT: 68 IN

## 2025-02-27 DIAGNOSIS — R10.2 PELVIC PAIN: Primary | ICD-10-CM

## 2025-02-27 DIAGNOSIS — Z11.3 SCREENING FOR STD (SEXUALLY TRANSMITTED DISEASE): ICD-10-CM

## 2025-02-27 DIAGNOSIS — N64.4 BREAST TENDERNESS: ICD-10-CM

## 2025-02-27 LAB
BACTERIA UR QL AUTO: ABNORMAL /HPF
BILIRUB UR QL STRIP: NEGATIVE
BV WHIFF TEST VAG QL: NORMAL
CLARITY UR: CLEAR
CLUE CELLS SPEC QL WET PREP: NORMAL
COLOR UR: ABNORMAL
GLUCOSE UR STRIP-MCNC: NEGATIVE MG/DL
HGB UR QL STRIP.AUTO: NEGATIVE
KETONES UR STRIP-MCNC: NEGATIVE MG/DL
LEUKOCYTE ESTERASE UR QL STRIP: NEGATIVE
MUCOUS THREADS UR QL AUTO: ABNORMAL
NITRITE UR QL STRIP: NEGATIVE
NON-SQ EPI CELLS URNS QL MICRO: ABNORMAL /HPF
PH SMN: 4.5 [PH]
PH UR STRIP.AUTO: 7 [PH]
PROT UR STRIP-MCNC: ABNORMAL MG/DL
RBC #/AREA URNS AUTO: ABNORMAL /HPF
SP GR UR STRIP.AUTO: 1.02 (ref 1–1.03)
T VAGINALIS VAG QL WET PREP: NORMAL
UROBILINOGEN UR STRIP-ACNC: <2 MG/DL
WBC #/AREA URNS AUTO: ABNORMAL /HPF
YEAST VAG QL WET PREP: NORMAL

## 2025-02-27 PROCEDURE — 87210 SMEAR WET MOUNT SALINE/INK: CPT | Performed by: OBSTETRICS & GYNECOLOGY

## 2025-02-27 PROCEDURE — 99213 OFFICE O/P EST LOW 20 MIN: CPT | Performed by: OBSTETRICS & GYNECOLOGY

## 2025-02-27 PROCEDURE — 87591 N.GONORRHOEAE DNA AMP PROB: CPT | Performed by: OBSTETRICS & GYNECOLOGY

## 2025-02-27 PROCEDURE — 76642 ULTRASOUND BREAST LIMITED: CPT

## 2025-02-27 PROCEDURE — 87086 URINE CULTURE/COLONY COUNT: CPT | Performed by: OBSTETRICS & GYNECOLOGY

## 2025-02-27 PROCEDURE — 81001 URINALYSIS AUTO W/SCOPE: CPT | Performed by: OBSTETRICS & GYNECOLOGY

## 2025-02-27 PROCEDURE — 87491 CHLMYD TRACH DNA AMP PROBE: CPT | Performed by: OBSTETRICS & GYNECOLOGY

## 2025-02-27 NOTE — PATIENT INSTRUCTIONS
"  We discussed UTI prevention at length: Vitamin C, probiotics, cranberry supplements, AZO, D-mannose, avoid bladder irritants (coffee, soda, tea, alcohol), avoid smoking, avoid holding bladder for prolonged periods of time, void immediately before and after sexual activity, Replens or coconut oil for vaginal dryness, hydrate with at least 64 oz water daily.    Patient Education     Ovarian cysts   The Basics   Written by the doctors and editors at [facility]   What are ovarian cysts? -- Ovarian cysts are fluid-filled sacs that develop on or in an ovary (figure 1). The ovaries are part of your reproductive system. If you are still having a monthly period, your ovaries release an egg about once a month.  Often, an ovarian cyst will go away on its own. Sometimes, the cyst gets bigger, breaks open, or causes the ovary to twist. If this happens, you might need surgery.  Many people worry about cancer when they learn that they have ovarian cysts. But most of the time, cysts are not cancer.  What are the symptoms of ovarian cysts? -- You might have no symptoms. If you do have symptoms, they can include pain or pressure in your lower belly on 1 side. The pain can be dull or sharp, and it can come and go.  It is common for ovarian cysts to break open or \"rupture.\" This does not always cause symptoms. But if a cyst breaks open and releases a lot of blood and fluid into your belly, it can be very painful.  Other times, a cyst can cause the ovary to twist. This can be a serious problem and needs to be treated right away. Symptoms can include:   Sudden, intense pain in your lower belly - Pain is usually on 1 side and doesn't go away. It might make you double over or be unable to walk.   Nausea or vomiting   Fever  What causes ovarian cysts? -- There are many possible causes of ovarian cysts. The most common causes include:   Ovulation or pregnancy - Ovulation is when an egg is released from the ovary each month. For this to " "happen, the ovary grows a sac, called a \"follicle.\" Sometimes, a follicle grows but does not release an egg and instead forms a cyst. Or if you get pregnant after the egg is released, a cyst can stay on the ovary for weeks or months. These kinds of cysts are not harmful and usually go away on their own.   Dermoid cysts - These are a common type of cyst. They sometimes have teeth, hair, or fat in them. That might sound strange. Dermoid cysts are normally not harmful to your health, but your doctor might want to remove them with surgery.   Polycystic ovary syndrome (\"PCOS\") - In this condition, the ovary grows many small cysts, instead of 1 big follicle that goes away each month. These cysts usually do not go away, but the cysts themselves do not need to be treated or removed. PCOS does sometimes require treatment with medicine for reasons unrelated to the cysts.   Endometriosis - Endometriosis is a condition where tissue normally found in the uterus grows outside of the uterus. Endometriosis can also form cysts on the ovary. People with endometriosis might have belly pain during their periods or at other times, pain in the belly during sex, or trouble getting pregnant.   Cancer - Cancer is the cause of ovarian cysts in less than 1 in 100 cases. Ovarian cancer is most likely to affect older people who have been through menopause (no longer have a monthly period) or who have a family history of ovarian cancer.  How big is an ovarian cyst? -- Cysts are usually 1 to 2 inches (2.5 to 5 cm) wide. But they can be bigger.  Is there a test for ovarian cysts? -- Yes. Common tests include:   Imaging tests - The most common is a pelvic ultrasound. This test uses sound waves to make pictures of your uterus and ovaries. The pictures can show if you have cysts. It can also show where they are and how big they are. Your doctor might also take pictures with an MRI or a CT scan.   Blood tests - These can check for pregnancy or the " possibility of cancer.  How are ovarian cysts treated? -- It depends on what is causing your cysts and what your symptoms are. Possible treatments include:   Waiting - Your doctor might want to do an ultrasound every couple of months. Your cysts might stay the same size, get smaller, or even go away. In those cases, you usually don't need to do anything to treat them. If cysts grow or change, you might need treatment.  While waiting, your doctor might have you use over-the-counter medicines to help with pain. Examples include acetaminophen (sample brand name: Tylenol), ibuprofen (sample brand names: Advil, Motrin), and naproxen (sample brand name: Aleve).   Birth control pills - This medicine can stop some types of new cysts from forming.   Surgery - This can involve removing a cyst or the whole ovary.  What if I want to get pregnant? -- That depends on what is causing your ovarian cysts. Most people with cysts are able to get pregnant. Even if 1 of your ovaries is removed, it is usually still possible to get pregnant. If you can't get pregnant, medicines or new technologies can help. If you want to have a baby, talk with your doctor about your options.  When should I call the doctor? -- Call your doctor right away or go to the emergency department if you have ovarian cysts and have:   Sudden, severe pain in your lower belly   Nausea or vomiting   Fever  You should also call your doctor or nurse if:   You have new or worsening pain.   You have pain when you urinate.   You have pain with sex.   You miss a period or have bleeding between periods.  All topics are updated as new evidence becomes available and our peer review process is complete.  This topic retrieved from [employer] on: Feb 28, 2024.  Topic 11820 Version 16.0  Release: 32.2.4 - C32.58  © 2024 UpToDate, Inc. and/or its affiliates. All rights reserved.  figure 1: Ovarian cyst     Ovarian cysts are fluid-filled sacs that develop in or on the ovary.  Graphic  142694 Version 1.0  Consumer Information Use and Disclaimer   Disclaimer: This generalized information is a limited summary of diagnosis, treatment, and/or medication information. It is not meant to be comprehensive and should be used as a tool to help the user understand and/or assess potential diagnostic and treatment options. It does NOT include all information about conditions, treatments, medications, side effects, or risks that may apply to a specific patient. It is not intended to be medical advice or a substitute for the medical advice, diagnosis, or treatment of a health care provider based on the health care provider's examination and assessment of a patient's specific and unique circumstances. Patients must speak with a health care provider for complete information about their health, medical questions, and treatment options, including any risks or benefits regarding use of medications. This information does not endorse any treatments or medications as safe, effective, or approved for treating a specific patient. UpToDate, Inc. and its affiliates disclaim any warranty or liability relating to this information or the use thereof.The use of this information is governed by the Terms of Use, available at https://www.[employer].com/en/know/clinical-effectiveness-terms. 2024© UpToDate, Inc. and its affiliates and/or licensors. All rights reserved.  Copyright   © 2024 UpToDate, Inc. and/or its affiliates. All rights reserved.       Perineal Hygiene      Your vaginal naturally takes care of its self, it is a self washing system, the less you mess the healthier it will be     No soaps or feminine wash to the vulva, these products can cause dermitis, bacterial infections and other vulvar problems.   Use only water to cleanse, or water with Dove or Dove Sensitive Skin Bar soap if necessary.    Avoid the use of washcloths, exfoliating edward's, netted scrubbers, loofa's, use your hands only to cleanse the vulvar  tissues    No scented lotions or products are advised in or near your vulva.    Use coconut oil in its solid form for moisture if needed.  No douching this may cause imbalance in your vaginal PH and further issues.    If you wear panty liners, you may apply a thin coating of Vaseline, A&D ointment or coconut oil to the vulvar tissues as a skin barrier     Cotton underware, loose fitting clothing  Only perfume-free, dye-free laundry detergent, use a second rinse cycle   Avoid fabric softeners/dryer sheets.       Your partner should avoid the same products as well.       Over the counter probiotic to restore vaginal erik may be helpful as well, take daily.       You may also look into Boric Acid vaginal suppositories to restore vaginal PH balance for up to 2 weeks as directed on the box. You may not use these if you are pregnant      For vaginal dryness:      You may use:     Coconut oil in solid form, not liquid (organic, pure, unscented) as needed for moisture or lubrication. ( Do not use if allergic)       Replens moisture restore external comfort gel daily ( use as directed on the box)        Replens long lasting vaginal moisturizer  ( use as directed on the box)     May try [place] vulvar moisturizer ( found on Amazon )    May try Revaree vaginal inserts        For Vaginal Lubrication:          You may use:     Coconut oil in solid form, not liquid (organic, pure, unscented) as a lubricant or another scent-free lubricant (Astroglide, Uberlube) if needed.  Do not use coconut oil or silicone if using a condom as this may break down the integrity of the condom and cause an unplanned pregnancy              Do not use coconut oil if allergic               Replens silky smooth lubricant, premium silicone based lubricant for intercourse. ( use as directed, a small amount will provide an enhanced natural feeling)     Any premium over the counter vaginal lubricant water or silicone based. Silicone based will have more  staying power.        For Vulvar irritation/itching:        You may use Hydrocortisone 1 % over the counter to external vulvar tissues 2 x daily for 5-7 days to help with irriation and itch relief.      Patient Education     Pelvic Pain   About this topic   Pelvic pain is pain below the belly button and above the legs. It may be acute and last a few hours or a few days or much longer. If the pain lasts longer than 3 months it is chronic pelvic pain.  Your pain may be sharp, dull, or cramping. It may be there all the time or may come and go. Sometimes, the pain builds up over a short time. You may feel the pain throughout this area of your body or in one specific area. It may be mild, moderate, or severe.  Pain can cause upset stomach and throwing up. When you are in pain you may not feel hungry. You may feel nervous. Pain may be a warning sign that something is wrong inside the body. Acute pelvic pain may be caused by a very serious health problem.  How your pelvic pain is treated is based on many things. Some of them are the kind of pain, how bad it is, and what is causing the pain. Treatment may include drugs or surgery.  What are the causes?   Problems in the belly or bowels like:  Blocked bowel  Appendicitis  Diverticulitis  Hernia  Hard stools  Irritable bowel syndrome  Peritonitis  Problems in the urinary tract like:  Urinary tract infection  Kidney or bladder stones  Narrowing of the urethra  Other causes like:  Muscle strain or spasm  Herniated disc  Bone problems  Hernias  Pelvic abscess  Pelvic inflammatory disease  Sexually transmitted disease  Nerve problems  Adhesions after surgery  IUD is in the wrong place  Problems with internal reproductive organs like:  Miscarriage or ectopic pregnancy  Ovarian cyst breaks open  Vaginal infection  Pelvic inflammatory disease or sexually transmitted disease  Problems with monthly periods  Ovarian or testicular torsion  Endometriosis or fibroids  Ovulation  pain  Prostate problems  Pain after a permanent birth control surgery  What are the main signs?   Tenderness in lower belly  Fever  Upset stomach and throwing up  Dizziness  Sweating  Feeling anxious  How does the doctor diagnose this health problem?   Your doctor will take your history and do an exam. If you have a vagina, this will likely include a pelvic exam with a tool called a speculum. Your doctor will ask about your pain to help find where it is located. The doctor may want to do some tests to find the cause of your pain. The doctor may order:  Lab tests  Pelvic exam  Ultrasound  X-ray  CT or MRI scan  Intravenous pyelography  Barium enema  Laparoscopy  How does the doctor treat this health problem?   Treatment is based on what is causing your pain. This may include changes in your diet or physical or sexual activity.  Are there other health problems to treat?   Depending on what is causing the pain, there may be other problems to treat.  What drugs may be needed?   The doctor may order drugs to:  Help with pain  Fight an infection  Balance hormones  Relax muscles  Lower stress and anxiety or help with depression  What can be done to prevent this health problem?   There is nothing you can do to prevent some of these problems.  Always practice safe sex by using condoms.  Manage digestive problems like constipation.  Last Reviewed Date   2021-09-14  Consumer Information Use and Disclaimer   This generalized information is a limited summary of diagnosis, treatment, and/or medication information. It is not meant to be comprehensive and should be used as a tool to help the user understand and/or assess potential diagnostic and treatment options. It does NOT include all information about conditions, treatments, medications, side effects, or risks that may apply to a specific patient. It is not intended to be medical advice or a substitute for the medical advice, diagnosis, or treatment of a health care provider  based on the health care provider's examination and assessment of a patient’s specific and unique circumstances. Patients must speak with a health care provider for complete information about their health, medical questions, and treatment options, including any risks or benefits regarding use of medications. This information does not endorse any treatments or medications as safe, effective, or approved for treating a specific patient. UpToDate, Inc. and its affiliates disclaim any warranty or liability relating to this information or the use thereof. The use of this information is governed by the Terms of Use, available at https://www.[employer].com/en/know/clinical-effectiveness-terms   Copyright   Copyright © 2024 UpToDate, Inc. and its affiliates and/or licensors. All rights reserved.

## 2025-02-28 ENCOUNTER — RESULTS FOLLOW-UP (OUTPATIENT)
Dept: OBGYN CLINIC | Facility: CLINIC | Age: 25
End: 2025-02-28

## 2025-02-28 LAB
C TRACH DNA SPEC QL NAA+PROBE: NEGATIVE
N GONORRHOEA DNA SPEC QL NAA+PROBE: NEGATIVE

## 2025-03-01 LAB — BACTERIA UR CULT: NORMAL

## 2025-03-04 ENCOUNTER — OFFICE VISIT (OUTPATIENT)
Dept: FAMILY MEDICINE CLINIC | Facility: MEDICAL CENTER | Age: 25
End: 2025-03-04
Payer: COMMERCIAL

## 2025-03-04 VITALS
SYSTOLIC BLOOD PRESSURE: 118 MMHG | BODY MASS INDEX: 32.19 KG/M2 | RESPIRATION RATE: 17 BRPM | TEMPERATURE: 98.4 F | HEART RATE: 90 BPM | HEIGHT: 68 IN | OXYGEN SATURATION: 98 % | WEIGHT: 212.4 LBS | DIASTOLIC BLOOD PRESSURE: 76 MMHG

## 2025-03-04 DIAGNOSIS — K64.9 HEMORRHOIDS, UNSPECIFIED HEMORRHOID TYPE: ICD-10-CM

## 2025-03-04 DIAGNOSIS — K59.00 CONSTIPATION, UNSPECIFIED CONSTIPATION TYPE: Primary | ICD-10-CM

## 2025-03-04 PROCEDURE — 99214 OFFICE O/P EST MOD 30 MIN: CPT

## 2025-03-04 RX ORDER — HYDROCORTISONE 25 MG/G
CREAM TOPICAL 2 TIMES DAILY
Qty: 28 G | Refills: 1 | Status: SHIPPED | OUTPATIENT
Start: 2025-03-04 | End: 2025-03-04

## 2025-03-04 RX ORDER — HYDROCORTISONE 25 MG/G
CREAM TOPICAL 2 TIMES DAILY
Qty: 28 G | Refills: 1 | Status: SHIPPED | OUTPATIENT
Start: 2025-03-04

## 2025-03-04 NOTE — PROGRESS NOTES
Name: Susana Young      : 2000      MRN: [id number]  Encounter Provider: MAIKOL Majano  Encounter Date: 3/4/2025   Encounter department: [facility] [place]  :  Assessment & Plan  Constipation, unspecified constipation type  Advised to take Metamucil daily in the morning on an empty stomach.  Increase dietary fiber intake.  Drink at least 64 ounces of water daily.  Get regular exercise.  Start daily stool softener.  Advised if no relief or worsening symptoms, follow-up with GI. Hold off on referral at this time.         Hemorrhoids, unspecified hemorrhoid type  Advised to use Anusol cream as needed for external hemorrhoids.    Orders:    hydrocortisone (ANUSOL-HC) 2.5 % rectal cream; Apply topically 2 (two) times a day           History of Present Illness   She is complaining of constipation alternating with diarrhea ongoing for about a year. She tells me the constipation is more concerning and she also reports feeling external hemorrhoids that worsen with constipation. No bleeding. No abdominal pain. She tells me she does get bloated with the constipation and she does get nauseous when she is having diarrhea. She is taking Dulcolax as needed maybe once every 2 months, not taking anything daily.   She does take iron supplement only as needed around menses.   She drinks water all day, at least 60 oz per day.   She is also now exercising regularly and making diet changes, has lost about 20 lbs.      Review of Systems   Constitutional: Negative.    HENT: Negative.     Eyes: Negative.    Respiratory: Negative.     Cardiovascular: Negative.    Gastrointestinal:  Positive for constipation and diarrhea.   Endocrine: Negative.    Genitourinary: Negative.    Musculoskeletal: Negative.    Skin: Negative.    Allergic/Immunologic: Negative.    Neurological: Negative.    Hematological: Negative.    Psychiatric/Behavioral: Negative.         Objective   /76 (BP Location: Left arm, Patient  "Position: Sitting, Cuff Size: Large)   Pulse 90   Temp 98.4 °F (36.9 °C) (Temporal)   Resp 17   Ht 5' 8\" (1.727 m)   Wt 96.3 kg (212 lb 6.4 oz)   LMP 02/19/2025 (Exact Date)   SpO2 98%   BMI 32.30 kg/m²      Physical Exam  Vitals and nursing note reviewed.   Constitutional:       General: She is not in acute distress.     Appearance: Normal appearance. She is not ill-appearing.   HENT:      Head: Normocephalic and atraumatic.   Cardiovascular:      Rate and Rhythm: Normal rate.   Pulmonary:      Effort: Pulmonary effort is normal.   Abdominal:      General: Abdomen is flat. Bowel sounds are normal.      Palpations: Abdomen is soft.      Tenderness: There is no abdominal tenderness. There is no guarding.   Neurological:      General: No focal deficit present.      Mental Status: She is alert and oriented to person, place, and time. Mental status is at baseline.   Psychiatric:         Mood and Affect: Mood normal.         Behavior: Behavior normal.         Thought Content: Thought content normal.       "

## 2025-03-04 NOTE — PATIENT INSTRUCTIONS
-Take Metamucil daily in the morning on an empty stomach.    -Increase dietary fiber.     -Drink at least 64 oz of water daily.    -Get regular exercise.    -Take 1-3 stool softeners daily.    -Follow up with GI if no improvement in symptoms.

## 2025-03-27 ENCOUNTER — TELEPHONE (OUTPATIENT)
Age: 25
End: 2025-03-27

## 2025-03-27 ENCOUNTER — HOSPITAL ENCOUNTER (OUTPATIENT)
Dept: ULTRASOUND IMAGING | Facility: HOSPITAL | Age: 25
End: 2025-03-27
Payer: COMMERCIAL

## 2025-03-27 DIAGNOSIS — R10.2 PELVIC PAIN: ICD-10-CM

## 2025-03-27 PROCEDURE — 76856 US EXAM PELVIC COMPLETE: CPT

## 2025-03-27 PROCEDURE — 76830 TRANSVAGINAL US NON-OB: CPT

## 2025-03-27 NOTE — TELEPHONE ENCOUNTER
Pt called to set up a follow up visit to discuss her results for the US and hormone treatment referral by her PCP. 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 Pt requesting to have the ofc call her

## 2025-03-31 ENCOUNTER — TELEPHONE (OUTPATIENT)
Age: 25
End: 2025-03-31

## 2025-03-31 DIAGNOSIS — K64.9 HEMORRHOIDS, UNSPECIFIED HEMORRHOID TYPE: ICD-10-CM

## 2025-03-31 DIAGNOSIS — K59.00 CONSTIPATION, UNSPECIFIED CONSTIPATION TYPE: Primary | ICD-10-CM

## 2025-03-31 NOTE — TELEPHONE ENCOUNTER
Please advise  Patient called to give PCP an update and further advice. Following a bowel program. Doing dietary recommendations, drinking recommended amount of water, staying active.  States she is taking Metamucil every other day and alternating with colace the other days . Friday noticed not moving her bowels as well as she would like. Some bloating, mild abdominal discomfort 1/10. Bm every other day however since Friday her BM was very minimal, and Sunday was the same, has no urge to push.  Asking if should add a laxative? Or other suggestions

## 2025-04-01 ENCOUNTER — TELEPHONE (OUTPATIENT)
Age: 25
End: 2025-04-01

## 2025-04-01 NOTE — TELEPHONE ENCOUNTER
Pt was contacted and notified of the providers recommendations, pt saying she has an appt with [place], and pt saying that she has a hormonal work up that she needs to do. Pt saying she has excruciating pain in the area at times, and would like to know why.

## 2025-04-01 NOTE — TELEPHONE ENCOUNTER
Pt calling in saying that she is unsure if she would like to have the dermoid cyst removed. Pt saying that she would like to let the provider know that she had vaginal bleeding again, and bleeds 3 days a week after her period. Pt would like further recommendations.

## 2025-04-01 NOTE — TELEPHONE ENCOUNTER
She can certainly make an appointment to discuss menstrual management,  and a separate consult with MD to discuss the Dermoid cyst. Most times surgical intervention is not needed unless the cyst grows, hers has been stable for years, it is still her choice to discuss management .

## 2025-04-07 NOTE — TELEPHONE ENCOUNTER
"Patient is calling back, stating that she is still having issues with constipation.    Last full BM was 3/3.  After 3 days of Miralax, she has very small result, yesterday.  Stool was \"estela textured\".  She denies nausea, vomiting or fever.  She does endorse some rectal discomfort and itching.  Denies blood.  Abdomen feels distended, she is passing flatus.      She has tried colace, metamucil and 3 days of miralax.  She states she is drinking plenty of water and eating a high fiber diet.    She is asking for further recommendations.       Preferred Pharmacy:   [facility] #[id number] - [place], [place] - [address] Phone: [phone]   Fax: [phone]        "

## 2025-04-07 NOTE — TELEPHONE ENCOUNTER
Please clarify last normal BM per message was 3/3? Or did she mean 4/3?   Also, it looks like she is scheduled with GI for 4/17, keep this appointment and she can try a fleets enema, this can be purchased otc, if no relief this can be repeated daily for up to 7 days.

## 2025-04-08 NOTE — TELEPHONE ENCOUNTER
Ok, that's great news! I do not advise taking that regularly, stick to stool softeners, metamucil and lots of water and dietary fiber intake. Follow up with GI as scheduled.

## 2025-04-09 ENCOUNTER — OFFICE VISIT (OUTPATIENT)
Dept: OBGYN CLINIC | Facility: MEDICAL CENTER | Age: 25
End: 2025-04-09
Payer: COMMERCIAL

## 2025-04-09 VITALS
WEIGHT: 211 LBS | DIASTOLIC BLOOD PRESSURE: 62 MMHG | BODY MASS INDEX: 31.98 KG/M2 | HEIGHT: 68 IN | SYSTOLIC BLOOD PRESSURE: 116 MMHG

## 2025-04-09 DIAGNOSIS — N92.3 INTERMENSTRUAL BLEEDING: ICD-10-CM

## 2025-04-09 DIAGNOSIS — L68.0 HIRSUTIES: Primary | ICD-10-CM

## 2025-04-09 PROCEDURE — 99214 OFFICE O/P EST MOD 30 MIN: CPT | Performed by: CLINICAL NURSE SPECIALIST

## 2025-04-09 NOTE — PROGRESS NOTES
Name: Susana Young      : 2000      MRN: [id number]  Encounter Provider: MAIKOL Sanderson  Encounter Date: 2025   Encounter department: [facility] [place]    :  Assessment & Plan  Hirsuties  Pt c/o increasing hair growth on breast, abd, axilla. Would like hormonal testing.  She has regular menses and is not the typical PCOS profile. Neither ovary enlarged on US.   F/U after testing   Orders:  •  CBC; Future  •  DHEA-sulfate; Future  •  Estradiol; Future  •  Follicle stimulating hormone; Future  •  Luteinizing hormone; Future  •  Prolactin; Future  •  Testosterone, free, total; Future  •  TSH, 3rd generation with Free T4 reflex; Future  •  hCG, quantitative; Future  •  17-Hydroxyprogesterone; Future    Intermenstrual bleeding  C/O intermenstrual bleeding x 2 - See HPI. Us and Cultures already done. Labs ordered to r/o HPO imbalance.   Reviewed could be due to polyp, hyperplasia, friable cervix.or stenosis. Concerned for polyp due to fluid in endometrial cavitiy outside menses, but no feeder vessel seen. Rec to RTO after labs and will likely rec  EMB (endometrial biopsy).                I have spent a total time of 45 minutes in caring for this patient on the day of the visit/encounter including Diagnostic results, Risks and benefits of tx options, Patient and family education, Risk factor reductions, Impressions, Counseling / Coordination of care, Documenting in the medical record, Reviewing/placing orders in the medical record (including tests, medications, and/or procedures), and Obtaining or reviewing history  .    Subjective:   History of Present Illness     Susana Young is a 24 y.o. female.She is here for Follow-up   F/U from visit in Feb- to review results  Seen  by [place]- see visit.  C/O pelvic pain along with urinary and vaginal c/o   Workup reviewed  UA nml- other than trace protein; US mixed grth   GC/CT negative   US done 3/27 which was largely  "normal other than Right ovarian mass -complex; appearance c/w suspected dermoid cyst 2.7 cm- known for several years. While not in impression of report Uterus is retroverted, and  there is a notation of fluid in EM canal, but did not have her menses as report states. LMP 3/17-3/22    Pain is improved- She is still has some RLQ pain that is worse during period  Periods are approx 28 days lasting 5-6 days. Moderate to heavy flow. Sometimes with significant cramping.  There is a  FH endometriosis    She is requesting hormonal testing.due to ome bleeding in between periods, and increased hair growth.   The abnormal bleeding has occurred now Twice in the past 8 months   Normal menses then stops for a few days and then will have some very light bleeding x 2-3 days.dark red, tiny clots.   No association with Sexual activity    Did have w/u for this last August with Dr Mcbride- but was first instance    She dose report \"ovarian pain\" a few days after periods ends and thinks she is having cysts rupturing.     Regarding hair growth c/o- Is noting increased nipple and abd hair- has to shave now.   Increased axillary hair thickness and coarser.  Nml pubic hair distrubution     and using condoms  Planning pregnancy in a few years and is worried about fertility.    Hx of GI issues as well- for which she has seen GI for.  Menstrual History:  Patient's last menstrual period was 03/17/2025 (exact date).          Review of Systems   Constitutional:  Negative for appetite change, chills, fatigue, fever and unexpected weight change.   HENT: Negative.     Eyes: Negative.    Respiratory:  Negative for chest tightness and shortness of breath.    Cardiovascular:  Negative for chest pain and palpitations.   Gastrointestinal:  Negative for abdominal pain, constipation and vomiting.   Endocrine: Negative for cold intolerance and heat intolerance.   Genitourinary:         As per HPI   Musculoskeletal:  Negative for back pain, joint " "swelling and neck pain.   Skin:  Negative for color change and rash.   Neurological:  Negative for dizziness, weakness and numbness.   Hematological:  Does not bruise/bleed easily.   Psychiatric/Behavioral: Negative.       The following portions of the patient's history were reviewed and updated as appropriate: allergies, current medications, past family history, past medical history, past social history, past surgical history, and problem list.          Objective:   Objective   /62 (BP Location: Left arm, Patient Position: Sitting, Cuff Size: Large)   Ht 5' 8\" (1.727 m)   Wt 95.7 kg (211 lb)   LMP 03/17/2025 (Exact Date)   BMI 32.08 kg/m²     Physical Exam  Constitutional:       General: She is not in acute distress.     Appearance: Normal appearance.   Genitourinary:      Genitourinary Comments: Pelvic exam deferred     Cardiovascular:      Rate and Rhythm: Normal rate.   Pulmonary:      Effort: Pulmonary effort is normal.   Abdominal:      Palpations: Abdomen is soft.   Musculoskeletal:         General: Normal range of motion.   Neurological:      Mental Status: She is alert and oriented to person, place, and time.   Skin:     General: Skin is warm and dry.   Psychiatric:         Mood and Affect: Mood normal.         Behavior: Behavior normal.             "

## 2025-04-17 ENCOUNTER — OFFICE VISIT (OUTPATIENT)
Dept: GASTROENTEROLOGY | Facility: CLINIC | Age: 25
End: 2025-04-17
Payer: COMMERCIAL

## 2025-04-17 VITALS
HEIGHT: 60 IN | DIASTOLIC BLOOD PRESSURE: 70 MMHG | SYSTOLIC BLOOD PRESSURE: 118 MMHG | WEIGHT: 212.6 LBS | OXYGEN SATURATION: 98 % | HEART RATE: 81 BPM | TEMPERATURE: 97.2 F | BODY MASS INDEX: 41.74 KG/M2

## 2025-04-17 DIAGNOSIS — K59.00 CONSTIPATION, UNSPECIFIED CONSTIPATION TYPE: ICD-10-CM

## 2025-04-17 DIAGNOSIS — K64.9 HEMORRHOIDS, UNSPECIFIED HEMORRHOID TYPE: ICD-10-CM

## 2025-04-17 DIAGNOSIS — R19.4 CHANGE IN BOWEL HABITS: Primary | ICD-10-CM

## 2025-04-17 PROCEDURE — 99203 OFFICE O/P NEW LOW 30 MIN: CPT | Performed by: PHYSICIAN ASSISTANT

## 2025-04-17 NOTE — PROGRESS NOTES
Name: Susana Young      : 2000      MRN: [id number]  Encounter Provider: Amy Kaminski PA-C  Encounter Date: 2025   Encounter department: [facility] [place]  :  Assessment & Plan  Change in bowel habits  She reports a long history of frequent watery diarrhea  In January she had the norovirus and since then reports more constipation  She reports abdominal discomfort and bloating  She tried MiraLAX and Colace to treat the constipation but felt both made her worse  After several days of no bowel movement she took magnesium citrate which did improve her symptoms    Since then she continues to struggle with irregular bowel movements  She is going more frequently but reports the stools can be sticky and claylike    She is going to try a probiotic  Historically she reports that if she drank a yogurt her symptoms would improve however she is hesitant to do much dairy as her calcium levels have been elevated in the past  We will check labs  Her lipase has been low -will check fecal elastase  Will rule out celiac            History of Present Illness   HPI  Susana Young is a 24 y.o. female with a long history of frequent diarrhea who presents for evaluation of change in bowel habits.  She reports that for most of her life she is struggled with frequent urgent stools.  In January she suffered the norovirus.  After that she seemed to become constipated for period time.  She spoke with her family doctor who advised fiber, MiraLAX, Colace.  She thinks that the medications actually made her worse.  She eventually drank a quarter of a bottle of magnesium citrate and this improved her symptoms.  Since then she has been regular but   Still struggles.  She reports more frequent bowel movements however they can be difficult to pass.  She reports that the consistency has been somewhat sticky almost claylike.  She reports gaseousness and bloating but no severe abdominal pain.  She  has probiotics at home but has not been taking them.  She is understandably concerned about her symptoms she has had lab abnormalities in the past.  She reports that she tends to have a high calcium level and so she typically avoids dairy.  Because of that she has not been taking a probiotic supplement consistently.  She has had low potassium in the past which has had to be corrected.  She reports that her father has bowel issues although she is not exactly sure what these are.  She does not know of any other family history of any serious gastrointestinal disorders.  Labs from January of this year document a low amylase and lipase.  She has otherwise not had any gastrointestinal testing in the past.  She is currently in nursing school.      Review of Systems       Objective   /70 (BP Location: Right arm, Patient Position: Sitting, Cuff Size: Standard)   Pulse 81   Temp (!) 97.2 °F (36.2 °C) (Temporal)   Ht 5' (1.524 m)   Wt 96.4 kg (212 lb 9.6 oz)   LMP 03/17/2025 (Exact Date)   SpO2 98%   BMI 41.52 kg/m²      Physical Exam

## 2025-04-24 LAB
ALBUMIN SERPL-MCNC: 4.7 G/DL (ref 3.6–5.1)
ALBUMIN SERPL-MCNC: 4.7 G/DL (ref 3.6–5.1)
ALBUMIN/GLOB SERPL: 1.9 (CALC) (ref 1–2.5)
ALBUMIN/GLOB SERPL: 1.9 (CALC) (ref 1–2.5)
ALP SERPL-CCNC: 50 U/L (ref 36–130)
ALP SERPL-CCNC: 50 U/L (ref 36–130)
ALT SERPL-CCNC: 8 U/L (ref 9–46)
ALT SERPL-CCNC: 8 U/L (ref 9–46)
AST SERPL-CCNC: 8 U/L (ref 10–40)
AST SERPL-CCNC: 8 U/L (ref 10–40)
BILIRUB SERPL-MCNC: 0.5 MG/DL (ref 0.2–1.2)
BILIRUB SERPL-MCNC: 0.5 MG/DL (ref 0.2–1.2)
BUN SERPL-MCNC: 8 MG/DL (ref 7–25)
BUN SERPL-MCNC: 8 MG/DL (ref 7–25)
BUN/CREAT SERPL: 15 (CALC) (ref 6–22)
BUN/CREAT SERPL: 15 (CALC) (ref 6–22)
CALCIUM SERPL-MCNC: 9.8 MG/DL (ref 8.6–10.3)
CALCIUM SERPL-MCNC: 9.8 MG/DL (ref 8.6–10.3)
CHLORIDE SERPL-SCNC: 103 MMOL/L (ref 98–110)
CHLORIDE SERPL-SCNC: 103 MMOL/L (ref 98–110)
CO2 SERPL-SCNC: 30 MMOL/L (ref 20–32)
CO2 SERPL-SCNC: 30 MMOL/L (ref 20–32)
CREAT SERPL-MCNC: 0.55 MG/DL (ref 0.6–1.24)
CREAT SERPL-MCNC: 0.55 MG/DL (ref 0.6–1.24)
CRP SERPL-MCNC: 6.9 MG/L
CRP SERPL-MCNC: 6.9 MG/L
GFR/BSA.PRED SERPLBLD CYS-BASED-ARV: 143 ML/MIN/1.73M2
GFR/BSA.PRED SERPLBLD CYS-BASED-ARV: 143 ML/MIN/1.73M2
GLOBULIN SER CALC-MCNC: 2.5 G/DL (CALC) (ref 1.9–3.7)
GLOBULIN SER CALC-MCNC: 2.5 G/DL (CALC) (ref 1.9–3.7)
GLUCOSE SERPL-MCNC: 90 MG/DL (ref 65–99)
GLUCOSE SERPL-MCNC: 90 MG/DL (ref 65–99)
IGA SERPL-MCNC: 215 MG/DL (ref 47–310)
POTASSIUM SERPL-SCNC: 4.4 MMOL/L (ref 3.5–5.3)
POTASSIUM SERPL-SCNC: 4.4 MMOL/L (ref 3.5–5.3)
PROT SERPL-MCNC: 7.2 G/DL (ref 6.1–8.1)
PROT SERPL-MCNC: 7.2 G/DL (ref 6.1–8.1)
SODIUM SERPL-SCNC: 139 MMOL/L (ref 135–146)
SODIUM SERPL-SCNC: 139 MMOL/L (ref 135–146)
TTG IGA SER-ACNC: <1 U/ML

## 2025-04-25 ENCOUNTER — RESULTS FOLLOW-UP (OUTPATIENT)
Dept: GASTROENTEROLOGY | Facility: CLINIC | Age: 25
End: 2025-04-25

## 2025-04-30 ENCOUNTER — RESULTS FOLLOW-UP (OUTPATIENT)
Dept: OBGYN CLINIC | Facility: MEDICAL CENTER | Age: 25
End: 2025-04-30

## 2025-05-01 ENCOUNTER — TELEPHONE (OUTPATIENT)
Age: 25
End: 2025-05-01

## 2025-05-01 LAB
B-HCG SERPL-ACNC: <5 MIU/ML
DHEA-S SERPL-MCNC: 314 MCG/DL (ref 14–349)
ERYTHROCYTE [DISTWIDTH] IN BLOOD BY AUTOMATED COUNT: 12.3 % (ref 11–15)
ESTRADIOL SERPL-MCNC: 105 PG/ML
FSH SERPL-ACNC: 13.9 MIU/ML
HCT VFR BLD AUTO: 41.9 % (ref 35–45)
HGB BLD-MCNC: 13.7 G/DL (ref 11.7–15.5)
LH SERPL-ACNC: 37.1 MIU/ML
MCH RBC QN AUTO: 28.2 PG (ref 27–33)
MCHC RBC AUTO-ENTMCNC: 32.7 G/DL (ref 32–36)
MCV RBC AUTO: 86.4 FL (ref 80–100)
PLATELET # BLD AUTO: 375 THOUSAND/UL (ref 140–400)
PMV BLD REES-ECKER: 10 FL (ref 7.5–12.5)
PROLACTIN SERPL-MCNC: 9.7 NG/ML
RBC # BLD AUTO: 4.85 MILLION/UL (ref 3.8–5.1)
TESTOST FREE SERPL-MCNC: 2.5 PG/ML (ref 35–155)
TESTOST SERPL-MCNC: 16 NG/DL (ref 250–1100)
TSH SERPL-ACNC: 1.26 MIU/L
WBC # BLD AUTO: 6.5 THOUSAND/UL (ref 3.8–10.8)

## 2025-05-01 NOTE — TELEPHONE ENCOUNTER
Patient called to review lab results, reviewed VEN[facility] Villalobos lab note and patient thankful to provider for following up with the lab reference range information.   Patient okay with MyChart or m back when this info is available for her.  Inbasket sent to provider to update

## 2025-05-01 NOTE — RESULT ENCOUNTER NOTE
Spoke to [employer]. 17 OHP was indeed missed and will need to be recollected.   They had her in as a male- Which skews the reference ranges.  They are sending a new report.   Please let her know

## 2025-05-01 NOTE — RESULT ENCOUNTER NOTE
Testosterone is low- not usually much of a concern in women, more concerning if high   FSH and LH mildly elevated along with estradiol per the labs reference ranges  Estradiol reference range is <39.- which is uncommon.   Prolactin, TSH, DHEA-s, and CBC wnl  HCG negative  17 OHP missing/or pending .  Labs done due to increased hirsuit symptoms with regular menses  US w/o PCO appearance. Small complex Rt ovarian mass most likely c/w dermoid.  Will call lab in the am to inquire about reference range.

## 2025-05-01 NOTE — RESULT ENCOUNTER NOTE
OK- so new reports rec'd   Testosterone still on the low side which as previous- not usually much of a concern in women, more concerning if high  Fsh and LH now WNL. And remainder of labs in nml range.  Just need the 17 OHP test

## 2025-05-01 NOTE — TELEPHONE ENCOUNTER
Patient went to an outside lab and was just made aware that the lab had her gender as a male.  States that she went for her OB/GYN and hormone tests that it's skewed a lot of their results.  Aware that it shouldn't affect what we did and the results that we did in GI looked ok and she was negative for celiacs.

## 2025-05-02 ENCOUNTER — RESULTS FOLLOW-UP (OUTPATIENT)
Dept: OBGYN CLINIC | Facility: MEDICAL CENTER | Age: 25
End: 2025-05-02

## 2025-05-22 ENCOUNTER — TELEPHONE (OUTPATIENT)
Age: 25
End: 2025-05-22

## 2025-05-22 NOTE — TELEPHONE ENCOUNTER
Patient can't make the appointment on 5/23. She is currently in school and clinicals until end of August can she wait that long.

## 2025-05-23 NOTE — TELEPHONE ENCOUNTER
Patient scheduled for an EMB on 5/29/25.  Patient canceled the appointment.  She is currently in school.  Patient waiting for a call back if it is ok to wait until the end of August.

## 2025-05-28 ENCOUNTER — RESULTS FOLLOW-UP (OUTPATIENT)
Dept: OBGYN CLINIC | Facility: MEDICAL CENTER | Age: 25
End: 2025-05-28

## 2025-05-28 ENCOUNTER — TELEPHONE (OUTPATIENT)
Age: 25
End: 2025-05-28

## 2025-05-28 LAB — 17OHP SERPL-MCNC: 102 NG/DL

## 2025-05-28 NOTE — TELEPHONE ENCOUNTER
Patient returned a call. States she missed a call from someone in office she believes it is regarding rescheduling her endometrial biopsy, however no telephone encounter. She states she had to cancel her EMB for 5/29 due to being in school. She states she needs it scheduled on a Friday or in September with Carol Watson.

## 2025-06-03 NOTE — TELEPHONE ENCOUNTER
JOSE to conf 6/10 at 8:30, if she can't I did scheduled her into the next Friday Mae had for July 11th at 8:30

## 2025-06-03 NOTE — TELEPHONE ENCOUNTER
Patient returning call. States she can only come in Fridays after 3PM only in [place] office. Scheduled 7/25 3:30PM. Patient states please don't offer any sooner appointments unless Friday after 3pm in [place]

## 2025-07-15 ENCOUNTER — NURSE TRIAGE (OUTPATIENT)
Age: 25
End: 2025-07-15

## 2025-07-15 DIAGNOSIS — R19.7 DIARRHEA, UNSPECIFIED TYPE: Primary | ICD-10-CM

## 2025-07-17 ENCOUNTER — TELEPHONE (OUTPATIENT)
Age: 25
End: 2025-07-17

## 2025-07-17 NOTE — TELEPHONE ENCOUNTER
[facility] calling regarding stool sample.  Need verification of what needed.  Advised to do the Gastrointestinal pathogen panel that covers everything.  States understanding.

## 2025-07-23 LAB — CALPROTECTIN STL-MCNT: 5 MCG/G

## 2025-07-31 ENCOUNTER — OFFICE VISIT (OUTPATIENT)
Dept: GASTROENTEROLOGY | Facility: CLINIC | Age: 25
End: 2025-07-31
Payer: COMMERCIAL

## 2025-07-31 VITALS
HEIGHT: 60 IN | BODY MASS INDEX: 40.84 KG/M2 | OXYGEN SATURATION: 98 % | SYSTOLIC BLOOD PRESSURE: 108 MMHG | TEMPERATURE: 97.6 F | HEART RATE: 90 BPM | DIASTOLIC BLOOD PRESSURE: 74 MMHG | WEIGHT: 208 LBS

## 2025-07-31 DIAGNOSIS — K62.5 GASTROINTESTINAL HEMORRHAGE ASSOCIATED WITH ANORECTAL SOURCE: ICD-10-CM

## 2025-07-31 DIAGNOSIS — R10.84 GENERALIZED ABDOMINAL PAIN: ICD-10-CM

## 2025-07-31 DIAGNOSIS — K58.2 IRRITABLE BOWEL SYNDROME WITH BOTH CONSTIPATION AND DIARRHEA: Primary | ICD-10-CM

## 2025-07-31 DIAGNOSIS — K58.0 IRRITABLE BOWEL SYNDROME WITH DIARRHEA: ICD-10-CM

## 2025-07-31 DIAGNOSIS — R19.7 DIARRHEA, UNSPECIFIED TYPE: ICD-10-CM

## 2025-07-31 PROCEDURE — 99213 OFFICE O/P EST LOW 20 MIN: CPT | Performed by: PHYSICIAN ASSISTANT

## 2025-07-31 RX ORDER — DICYCLOMINE HCL 20 MG
20 TABLET ORAL EVERY 6 HOURS PRN
Qty: 30 TABLET | Refills: 3 | Status: SHIPPED | OUTPATIENT
Start: 2025-07-31

## 2025-08-14 ENCOUNTER — NURSE TRIAGE (OUTPATIENT)
Age: 25
End: 2025-08-14